# Patient Record
Sex: FEMALE | Race: WHITE | Employment: OTHER | URBAN - METROPOLITAN AREA
[De-identification: names, ages, dates, MRNs, and addresses within clinical notes are randomized per-mention and may not be internally consistent; named-entity substitution may affect disease eponyms.]

---

## 2017-01-20 PROBLEM — R53.82 CHRONIC FATIGUE: Status: ACTIVE | Noted: 2017-01-20

## 2017-06-18 NOTE — H&P
Patient:  Wendy Abraham  YOB: 1945   Date:                    6/19/17      This 81year old  patient was referred by Sarah Jorge MD.    Chief Complaint:  Patient seen at the request of Sarah Jorge MD for evaluation of the following:  diarrhea, Abdominal pain, nausea and vomiting    History of Present Illness:  1.  diarrhea   2. Abdominal pain   3.  nausea and vomiting   Mrs. Spears is a 81year old female who presents for extended follow up regarding diarrhea, abdominal pain, and vomiting. She underwent a colonoscopy on 10/25/16 which showed internal hemorrhoids and diverticulosis, random colon biopsies unremarkable. She has continued to have postprandial nausea, vomiting, and RUQ abdominal pain. She has about two episodes of vomiting per week. Often the food does not make it all the way to her stomach before it is regurgitated. She is status post 2 Nissen fundoplications, performed by Dr. Chris Quintero at [address]. The most recent one was around 2014. For the past year she has had frequent belching which causes social anxiety. Her bowel habits tend towards diarrhea, for which she has been prescribed cholestyramine. She has not taken this regularly because she does not want to \"get used to it\". It is effective when she takes it. Her RUQ pain has been alleviated with ERCP/stenting in 2011. According to her report, she underwent an EUS in [place] around 2014, which showed a non-concerning spot on her pancreas. There are no additional pertinent complaints at this time.         PAST MEDICAL/SURGICAL HISTORY  (Detailed)    Disease/disorder Onset Date Management Date Comments     Nissen Fundoplication [facility] 61/59/9686 -     Cholecystectomy       EGD with foreign body removal 10/2011 pancreatic stent removed     ERCP with sphincterotomy-Pancreatic stent 2011    Esophageal stricture 2011 EGD with dilation 2011    Gastritis  EGD 9/2011 Bile gastritis   Gastroesophageal reflux disease       Hypertension       Sphincter of OD dysfunction 1994 ERCP with sphincterotomy 1994    Stroke 2014   BLR 09/11/2014 - Left sided weakness   Thyroid disease         Additional Medical History:  IBS- diarrhea predominant   Fibrocystic breast disease  Hypothyroid  GERD  PUD- h pylori -neg 2011  HX TIA  5/2014    Additional Surgical History:  Left breast lumpectomy (Benign): 2005  Right needle breast biopsy (benign): 2006  Total abdomen hysterectomy with bilateral salpingo-oophorectomy:1989  Left shoulder surgery:1994    Procedure History:  Test Date Results Interp   COLONOSCOPY 10/25/2016 Diverticulosis large intestine w/o perforation or, Internal hemorrhoids    EGD 08/30/2011 Gastric ulcer, unspec, Esophageal ulcer w/o bleeding, Esophageal stricture    EGD 05/25/2011 Esophageal stricture, Duodenitis        Colonoscopy May 9, 2006 for diarrhea: diverticulosis, internal hemorrhoids. Random biopsies showed focal acute colitis. Colonoscopy surveillance 2013. Colonoscopy 10/13/09 CSY- normal (random bx normal) (surv 2016)  ERCP 1994 SOD dysfunction          PROVIDER INFORMATION AND PATIENT ADMISSION:    CURRENT MEDICATIONS  Medication Name Sig Desc   Zantac Maximum Strength 150 mg tablet take 1 tablet by oral route  every day   [place] Thyroid take 150mg daily   DHEA take 100mg daily   aspirin 325 mg tablet take 1 tablet by oral route  every day   carvedilol 25 mg tablet take 1 tablet by oral route 2 times every day with food   losartan 100 mg-hydrochlorothiazide 25 mg tablet take 1 tablet by oral route  every day   magnesium oxide 400 mg capsule take 1 po BID   Vitamin D3 1,000 unit capsule take 1 po qd   Vitamin B Complex tablet take 1 po qd   Medication Reconciliation  Medications reconciled today. Allergies:  Ingredient Reaction Medication Name Comment   DIPHENHYDRAMINE HCL   BENADRYL. BUTORPHANOL TARTRATE   STADOL. PENTAZOCINE HCL   TALWIN COMPOUND. NALBUPHINE HCL   NUBAIN.    PROCHLORPERAZINE EDISYLATE   COMPAZINE. CODEINE   CODEINE. PERPHENAZINE   TRILAFON. Reviewed, no changes. Family History  (Detailed)  Relationship Family Member Name  Age at Death Condition Onset Age Cause of Death       No family history of Colon polyps  N       No family history of Cancer, colon  N     SOCIAL HISTORY  (Detailed)  Homemaker. Tobacco use reviewed. Preferred language is [place]. MARITAL STATUS/FAMILY/SOCIAL SUPPORT  Currently . Smoking status: Never smoker. SMOKING STATUS  Use Status Type Smoking Status Usage Per Day Years Used Total Pack Years   no/never  Never smoker      no/never  Never smoker          REVIEW OF SYSTEMS  System Neg/Pos Details   ENMT Negative Hearing deficit. Eyes Negative Vision changes. Respiratory Negative Dyspnea. Cardio Negative Chest pain and irregular heartbeat/palpitations. GI Positive Abdominal pain, Diarrhea, Dysphagia, Nausea, Vomiting, Belching.  Negative Dysuria and urinary frequency. Endocrine Negative Cold intolerance and heat intolerance. Neuro Negative Confusion/disorientation, dizziness, headache and seizures. Hema/Lymph Negative Easy bleeding. The remainder of the 11-point review of systems is negative. VITAL SIGNS  BP mm/Hg Pulse/min Resp/min Temp F Height (Total in.) Weight (lbs.) Weight (oz.) BMI   150/88 80 20  62.00 140.00  25.61     PHYSICAL EXAM:  Weight has remained stable. Overweight. Patient is healthy-appearing in no acute  distress. Further exam deferred at this time. Completed Orders (this encounter)  Order Details   Dietary management education, guidance, and counseling    Medications Prescribed (this encounter)  Medication Directions   Questran 4 gram oral powder take 1 scoop by oral route  every day dissolved in 2 to 6 ounces of water or noncarbonated beverage     Assessment/Plan  # Detail Type Description    1. Assessment Diarrhea, unspecified (R19.7).     Plan Orders Today's instructions / counseling include(s) Dietary management education, guidance, and counseling. 2 Assessment Epigastric pain (R10.13). Provider Plan Today the patient's main problem is epigastric abdominal pain which is not new, presenting postprandially, and twice weekly vomiting. She takes Questran for diarrhea. She has a new symptom of belching for the past. Year. She has a history of Nissen fundoplication and redo two years later, most recently in 2014. This was at [address], records of which we do not have. She will bring these records to our office, as she apparently had an EUS at this time. She was lost to follow up after last being seen in 2011, although she was seen in 2014 at which time she was found to have hemoccult positive stool, colonoscopy with no microscopic colitis. She has had a complex case over the past few years. She states that she had some relief with a pancreatic stent. We will proceed with an EGD and dilation because of possible recurrent dysphagia and possible ulcers. Questran once daily at least, with more if needed. Possibly controlling her diarrhea will help with her other symptoms. Future considerations will be had for EUS, pancreatic stenting. I will review my office records as well as her records from [address]. My recollection was that the pancreatic stenting was not helpful, but I will review. Further recommendations after treatment above. Future considerations for treatment with Reglan, although she has shown allergies to similar drugs in the past, which will be taken into account. Addendum-I have reviewed her records and she did have improvement after both of her previous ERCPs or sphincter of oddi dysfunction. She has had a dystonic reaction with phenothiazines and therefore we elected not to use Reglan in the past. Further plans as above            Clinical Guidelines (Detailed)  Guidelines Status Due Action Last Addressed   Influenza vaccine  01/03/2018 Completed on 01/03/2017 01/03/2017   Mammogram Completed 10/19/2017 Performed Elsewhere on 10/19/2016 10/19/2016   Pneumococcal vaccine  01/03/2116 Completed on 01/03/2017 01/03/2017         Counseling / Educational Factors:  Items reviewed / discussed during today's visit: prior testing / procedures were reviewed; testing was discussed in detail; old records were reviewed; indications and risks of the procedure were discussed; prescription medication usage was discussed; symptomatic care was discussed; advised to continue current treatment; patient instructed to call for results of diagnostic testing. Patient expressed understanding of instructions. PT SEEN AND EXAMINED AND PLAN DISCUSSED AND IMPLEMENTED.   Evelyn Rivas MD

## 2017-06-19 ENCOUNTER — APPOINTMENT (OUTPATIENT)
Dept: GENERAL RADIOLOGY | Age: 72
End: 2017-06-19
Attending: INTERNAL MEDICINE
Payer: MEDICARE

## 2017-06-19 ENCOUNTER — HOSPITAL ENCOUNTER (OUTPATIENT)
Age: 72
Setting detail: OUTPATIENT SURGERY
Discharge: HOME | End: 2017-06-19
Attending: INTERNAL MEDICINE | Admitting: INTERNAL MEDICINE
Payer: MEDICARE

## 2017-06-19 ENCOUNTER — ANESTHESIA EVENT (OUTPATIENT)
Dept: ENDOSCOPY | Age: 72
End: 2017-06-19
Payer: MEDICARE

## 2017-06-19 ENCOUNTER — ANESTHESIA (OUTPATIENT)
Dept: ENDOSCOPY | Age: 72
End: 2017-06-19
Payer: MEDICARE

## 2017-06-19 VITALS
OXYGEN SATURATION: 95 % | SYSTOLIC BLOOD PRESSURE: 124 MMHG | HEART RATE: 72 BPM | TEMPERATURE: 98 F | WEIGHT: 136.91 LBS | DIASTOLIC BLOOD PRESSURE: 75 MMHG | BODY MASS INDEX: 24.26 KG/M2 | HEIGHT: 63 IN | RESPIRATION RATE: 16 BRPM

## 2017-06-19 PROCEDURE — 74011250636 HC RX REV CODE- 250/636

## 2017-06-19 PROCEDURE — 77030008477 HC STYL SATN SLP COVD -A: Performed by: ANESTHESIOLOGY

## 2017-06-19 PROCEDURE — 74011000250 HC RX REV CODE- 250

## 2017-06-19 PROCEDURE — 77030007288 HC DEV LOK BILI BSC -A: Performed by: INTERNAL MEDICINE

## 2017-06-19 PROCEDURE — 77030008703 HC TU ET UNCUF COVD -A: Performed by: ANESTHESIOLOGY

## 2017-06-19 PROCEDURE — 77030012595 HC SPHNTOM BILI BSC -D: Performed by: INTERNAL MEDICINE

## 2017-06-19 PROCEDURE — 74011250636 HC RX REV CODE- 250/636: Performed by: INTERNAL MEDICINE

## 2017-06-19 PROCEDURE — 74330 X-RAY BILE/PANC ENDOSCOPY: CPT

## 2017-06-19 PROCEDURE — 76040000025: Performed by: INTERNAL MEDICINE

## 2017-06-19 PROCEDURE — C2617 STENT, NON-COR, TEM W/O DEL: HCPCS | Performed by: INTERNAL MEDICINE

## 2017-06-19 PROCEDURE — 77030032490 HC SLV COMPR SCD KNE COVD -B: Performed by: INTERNAL MEDICINE

## 2017-06-19 PROCEDURE — 74011000250 HC RX REV CODE- 250: Performed by: INTERNAL MEDICINE

## 2017-06-19 PROCEDURE — 74011250636 HC RX REV CODE- 250/636: Performed by: ANESTHESIOLOGY

## 2017-06-19 PROCEDURE — 74011636320 HC RX REV CODE- 636/320: Performed by: INTERNAL MEDICINE

## 2017-06-19 PROCEDURE — 77030009038 HC CATH BILI STN RTVR BSC -C: Performed by: INTERNAL MEDICINE

## 2017-06-19 PROCEDURE — 76060000032 HC ANESTHESIA 0.5 TO 1 HR: Performed by: INTERNAL MEDICINE

## 2017-06-19 DEVICE — ZIMMON PANCREATIC STENT
Type: IMPLANTABLE DEVICE | Site: BILE DUCT | Status: FUNCTIONAL
Brand: ZIMMON

## 2017-06-19 RX ORDER — SODIUM CHLORIDE 0.9 % (FLUSH) 0.9 %
5-10 SYRINGE (ML) INJECTION AS NEEDED
Status: DISCONTINUED | OUTPATIENT
Start: 2017-06-19 | End: 2017-06-19 | Stop reason: HOSPADM

## 2017-06-19 RX ORDER — ROCURONIUM BROMIDE 10 MG/ML
INJECTION, SOLUTION INTRAVENOUS AS NEEDED
Status: DISCONTINUED | OUTPATIENT
Start: 2017-06-19 | End: 2017-06-19 | Stop reason: HOSPADM

## 2017-06-19 RX ORDER — FENTANYL CITRATE 50 UG/ML
INJECTION, SOLUTION INTRAMUSCULAR; INTRAVENOUS AS NEEDED
Status: DISCONTINUED | OUTPATIENT
Start: 2017-06-19 | End: 2017-06-19 | Stop reason: HOSPADM

## 2017-06-19 RX ORDER — PROPOFOL 10 MG/ML
INJECTION, EMULSION INTRAVENOUS AS NEEDED
Status: DISCONTINUED | OUTPATIENT
Start: 2017-06-19 | End: 2017-06-19 | Stop reason: HOSPADM

## 2017-06-19 RX ORDER — LIDOCAINE HYDROCHLORIDE 20 MG/ML
INJECTION, SOLUTION EPIDURAL; INFILTRATION; INTRACAUDAL; PERINEURAL AS NEEDED
Status: DISCONTINUED | OUTPATIENT
Start: 2017-06-19 | End: 2017-06-19 | Stop reason: HOSPADM

## 2017-06-19 RX ORDER — GLYCOPYRROLATE 0.2 MG/ML
INJECTION INTRAMUSCULAR; INTRAVENOUS AS NEEDED
Status: DISCONTINUED | OUTPATIENT
Start: 2017-06-19 | End: 2017-06-19 | Stop reason: HOSPADM

## 2017-06-19 RX ORDER — SUCCINYLCHOLINE CHLORIDE 20 MG/ML
INJECTION INTRAMUSCULAR; INTRAVENOUS AS NEEDED
Status: DISCONTINUED | OUTPATIENT
Start: 2017-06-19 | End: 2017-06-19 | Stop reason: HOSPADM

## 2017-06-19 RX ORDER — ONDANSETRON 2 MG/ML
INJECTION INTRAMUSCULAR; INTRAVENOUS AS NEEDED
Status: DISCONTINUED | OUTPATIENT
Start: 2017-06-19 | End: 2017-06-19 | Stop reason: HOSPADM

## 2017-06-19 RX ORDER — SODIUM CHLORIDE 0.9 % (FLUSH) 0.9 %
5-10 SYRINGE (ML) INJECTION EVERY 8 HOURS
Status: DISCONTINUED | OUTPATIENT
Start: 2017-06-19 | End: 2017-06-19 | Stop reason: HOSPADM

## 2017-06-19 RX ORDER — SODIUM CHLORIDE, SODIUM LACTATE, POTASSIUM CHLORIDE, CALCIUM CHLORIDE 600; 310; 30; 20 MG/100ML; MG/100ML; MG/100ML; MG/100ML
100 INJECTION, SOLUTION INTRAVENOUS CONTINUOUS
Status: DISCONTINUED | OUTPATIENT
Start: 2017-06-19 | End: 2017-06-19 | Stop reason: HOSPADM

## 2017-06-19 RX ORDER — FENTANYL CITRATE 50 UG/ML
50 INJECTION, SOLUTION INTRAMUSCULAR; INTRAVENOUS
Status: DISCONTINUED | OUTPATIENT
Start: 2017-06-19 | End: 2017-06-19 | Stop reason: HOSPADM

## 2017-06-19 RX ORDER — LIDOCAINE HYDROCHLORIDE 10 MG/ML
0.3 INJECTION INFILTRATION; PERINEURAL ONCE
Status: DISCONTINUED | OUTPATIENT
Start: 2017-06-19 | End: 2017-06-19 | Stop reason: HOSPADM

## 2017-06-19 RX ORDER — ONDANSETRON 2 MG/ML
4 INJECTION INTRAMUSCULAR; INTRAVENOUS
Status: DISCONTINUED | OUTPATIENT
Start: 2017-06-19 | End: 2017-06-19 | Stop reason: HOSPADM

## 2017-06-19 RX ADMIN — IOPAMIDOL 26 ML: 755 INJECTION, SOLUTION INTRAVENOUS at 10:15

## 2017-06-19 RX ADMIN — PROPOFOL 140 MG: 10 INJECTION, EMULSION INTRAVENOUS at 09:39

## 2017-06-19 RX ADMIN — SODIUM CHLORIDE, SODIUM LACTATE, POTASSIUM CHLORIDE, AND CALCIUM CHLORIDE 100 ML/HR: 600; 310; 30; 20 INJECTION, SOLUTION INTRAVENOUS at 09:21

## 2017-06-19 RX ADMIN — ROCURONIUM BROMIDE 5 MG: 10 INJECTION, SOLUTION INTRAVENOUS at 09:39

## 2017-06-19 RX ADMIN — PROMETHAZINE HYDROCHLORIDE 6.25 MG: 25 INJECTION INTRAMUSCULAR; INTRAVENOUS at 09:21

## 2017-06-19 RX ADMIN — FENTANYL CITRATE 50 MCG: 50 INJECTION, SOLUTION INTRAMUSCULAR; INTRAVENOUS at 09:39

## 2017-06-19 RX ADMIN — SUCCINYLCHOLINE CHLORIDE 180 MG: 20 INJECTION INTRAMUSCULAR; INTRAVENOUS at 09:39

## 2017-06-19 RX ADMIN — GLYCOPYRROLATE 0.2 MG: 0.2 INJECTION INTRAMUSCULAR; INTRAVENOUS at 09:58

## 2017-06-19 RX ADMIN — ONDANSETRON 4 MG: 2 INJECTION INTRAMUSCULAR; INTRAVENOUS at 10:03

## 2017-06-19 RX ADMIN — LIDOCAINE HYDROCHLORIDE 100 MG: 20 INJECTION, SOLUTION EPIDURAL; INFILTRATION; INTRACAUDAL; PERINEURAL at 09:39

## 2017-06-19 RX ADMIN — FENTANYL CITRATE 50 MCG: 50 INJECTION, SOLUTION INTRAMUSCULAR; INTRAVENOUS at 09:50

## 2017-06-19 NOTE — IP AVS SNAPSHOT
Erinn David 
 
 
 [address] [facility] [address] 
[phone] Patient: Reena Cummins MRN: [id number] :1945 You are allergic to the following Allergen Reactions Benadryl (Diphenhydramine Hcl) Anxiety Codeine Itching Compazine (Prochlorperazine Edisylate) Other (comments) Left face drawing Nubain (Nalbuphine) Anxiety Stadol (Butorphanol Tartrate) Anxiety Talwin (Pentazocine Lactate) Anxiety Trilafon Anxiety Ultram (Tramadol) Unknown (comments) Recent Documentation Height Weight BMI OB Status Smoking Status 1.588 m 62.1 kg 24.64 kg/m2 Hysterectomy Never Smoker Emergency Contacts Name Discharge Info Relation Home Work Mobile Brian Spears  Spouse [3] [phone] About your hospitalization You were admitted on:  2017 You last received care in the:  [facility] PACU You were discharged on:  2017 Unit phone number:  [phone] Why you were hospitalized Your primary diagnosis was:  Not on File Providers Seen During Your Hospitalizations Provider Role Specialty Primary office phone Italia Fall MD Attending Provider Gastroenterology [phone] Your Primary Care Physician (PCP) Primary Care Physician Office Phone Office Fax Kasey [address] [place] [address] [phone] Follow-up Information Follow up With Details Comments Contact Info Italia Fall MD  make a 3 month follow up appointment [address] [place] [address] [facility] [address] [place] [address] 
[phone] Your Appointments 2017  2:30 PM EDT Office Visit with Billy Awad MD  
[facility] Internal Medicine ([address] [place] [address]) [address] [place] [address] [facility] [address] [phone] Current Discharge Medication List  
  
ASK your doctor about these medications Dose & Instructions Dispensing Information Comments Morning Noon Evening Bedtime ARMOUR THYROID PO Your last dose was: Your next dose is:    
   
   
 Dose:  150 mg Take 150 mg by mouth daily. Refills:  0  
     
   
   
   
  
 aspirin 325 mg tablet Commonly known as:  ASPIRIN Your last dose was: Your next dose is:    
   
   
 Dose:  325 mg Take 325 mg by mouth every morning. Stopped 3 weeks ago Refills:  0  
     
   
   
   
  
 bioidentical hormones Your last dose was: Your next dose is: Take  by mouth. Every 6 to 8 months Refills:  0  
     
   
   
   
  
 carvedilol 25 mg tablet Commonly known as:  Esaw Math Your last dose was: Your next dose is:    
   
   
 Dose:  25 mg Take 1 Tab by mouth two (2) times daily (with meals). Quantity:  180 Tab Refills:  3 CHOLESTYRAMINE LIGHT 4 gram powder Generic drug:  Cholestyramine-Aspartame Your last dose was: Your next dose is: MIX 1 SCOOP WITH WATER OR JUICE AND TAKE ORALLY TWICE A DAY FOR CHRONIC DIARRHEA Quantity:  240 g Refills:  5  
     
   
   
   
  
 clobetasol 0.05 % external solution Commonly known as:  Lestine Terranceker Your last dose was: Your next dose is:    
   
   
 APPLY TO AFFECTED AREA AS NEEDED AS DIRECTED Quantity:  50 mL Refills:  4 DHEA PO Your last dose was: Your next dose is:    
   
   
 Dose:  100 mg Take 100 mg by mouth daily. Refills:  0  
     
   
   
   
  
 losartan-hydroCHLOROthiazide 100-25 mg per tablet Commonly known as:  HYZAAR Your last dose was: Your next dose is:    
   
   
 Dose:  1 Tab Take 1 Tab by mouth daily. Indications: Hypertension Quantity:  90 Tab Refills:  3  
     
   
   
   
  
 magnesium 250 mg Tab Your last dose was: Your next dose is:    
   
   
 Dose:  400 mg Take 400 mg by mouth two (2) times a day. Indications: HYPOMAGNESEMIA Refills:  0  
     
   
   
   
  
 meperidine 50 mg tablet Commonly known as:  DEMEROL Your last dose was: Your next dose is: Take  by mouth every six (6) hours as needed for Pain. Refills:  0  
     
   
   
   
  
 ondansetron 4 mg disintegrating tablet Commonly known as:  ZOFRAN ODT Your last dose was: Your next dose is:    
   
   
 Dose:  4 mg Take 1 Tab by mouth every eight (8) hours as needed for Nausea. Quantity:  30 Tab Refills:  5  
     
   
   
   
  
 promethazine 50 mg tablet Commonly known as:  PHENERGAN Your last dose was: Your next dose is:    
   
   
 Dose:  50 mg Take 50 mg by mouth every six (6) hours as needed for Nausea. Refills:  0  
     
   
   
   
  
 VITAMIN B-12 500 mcg tablet Generic drug:  cyanocobalamin Your last dose was: Your next dose is:    
   
   
 Dose:  500 mcg Take 500 mcg by mouth every morning. Refills:  0  
     
   
   
   
  
 VITAMIN D3 2,000 unit Tab Generic drug:  cholecalciferol (vitamin D3) Your last dose was: Your next dose is:    
   
   
 Dose:  1 Tab Take 1 Tab by mouth every morning. Refills:  0  
     
   
   
   
  
 ZANTAC 150 mg tablet Generic drug:  raNITIdine Your last dose was: Your next dose is:    
   
   
 Dose:  150 mg Take 150 mg by mouth two (2) times a day. Refills:  0 Discharge Instructions Endoscopic Retrograde Cholangiopancreatogram (ERCP): What to Expect at [facility] Your Recovery After you have an endoscopic retrograde cholangiopancreatogram (ERCP). You will be able to go home after your doctor or a nurse checks to make sure you are not having any problems.  If you stay in the hospital overnight, you may go home the next day. You may have a sore throat for a day or two after the procedure. This care sheet gives you a general idea about how long it will take for you to recover. But each person recovers at a different pace. Follow the steps below to get better as quickly as possible. How can you care for yourself at home? Activity 1. Rest as much as you need to after you go home. 2. You should be able to go back to your usual activities the day after the procedure. Diet · Follow your doctor's directions for eating after the procedure. · Drink plenty of fluids (unless your doctor tells you not to). Medicines · If you have a sore throat the next day, use an over-the-counter spray to numb your throat. Follow-up care is a key part of your treatment and safety. Be sure to make and go to all appointments, and call your doctor if you are having problems. Its also a good idea to know your test results and keep a list of the medicines you take. When should you call for help? Call 911 anytime you think you may need emergency care. For example, call if: 
· You vomit blood or what looks like coffee grounds. · You pass maroon or bloody stools. Call your doctor now or go to the emergency room if: 
· You have trouble swallowing. · You have belly pain. · Your stools are black and tarlike or have streaks of blood. · You are sick to your stomach and cannot drink fluids. · You have a fever. · You have pain that does not get better after you take your pain medicine. Watch closely for changes in your health, and be sure to contact your doctor if: 
· Your throat still hurts after a day or two. You do not get better as expected. After general anesthesia or intravenous sedation, for 24 hours or while taking prescription Narcotics: · Limit your activities · Do not drive and operate hazardous machinery · Do not make important personal or business decisions · Do  not drink alcoholic beverages · If you have not urinated within 8 hours after discharge, please contact your surgeon on call. *  Please give a list of your current medications to your Primary Care Provider. *  Please update this list whenever your medications are discontinued, doses are 
    changed, or new medications (including over-the-counter products) are added. *  Please carry medication information at all times in case of emergency situations. These are general instructions for a healthy lifestyle: No smoking/ No tobacco products/ Avoid exposure to second hand smoke Surgeon General's Warning:  Quitting smoking now greatly reduces serious risk to your health. Obesity, smoking, and sedentary lifestyle greatly increases your risk for illness A healthy diet, regular physical exercise & weight monitoring are important for maintaining a healthy lifestyle You may be retaining fluid if you have a history of heart failure or if you experience any of the following symptoms:  Weight gain of 3 pounds or more overnight or 5 pounds in a week, increased swelling in our hands or feet or shortness of breath while lying flat in bed. Please call your doctor as soon as you notice any of these symptoms; do not wait until your next office visit. Recognize signs and symptoms of STROKE: 
 
F-face looks uneven A-arms unable to move or move unevenly S-speech slurred or non-existent T-time-call 911 as soon as signs and symptoms begin-DO NOT go Back to bed or wait to see if you get better-TIME IS BRAIN. Discharge Orders None ACO Transitions of Care Introducing [employer] offers a voluntary care coordination program to provide high quality service and care to [facility] fee-for-service beneficiaries. Parisa Ibrahim was designed to help you enhance your health and well-being through the following services: ? Transitions of Care  support for individuals who are transitioning from one care setting to another (example: Hospital to home). ? Chronic and Complex Care Coordination  support for individuals and caregivers of those with serious or chronic illnesses or with more than one chronic (ongoing) condition and those who take a number of different medications. If you meet specific medical criteria, a [facility] may call you directly to coordinate your care with your primary care physician and your other care providers. For questions about the [facility] programs, please, contact your physicians office. For general questions or additional information about Accountable Care Organizations: 
Please visit www.medicare.gov/acos. html or call 1-800-MEDICARE (6-870.160.2245) TTY users should call 3-845.965.3021. Introducing [facility]! [employer] introduces [employer]Line patient portal. Now you can access parts of your medical record, email your doctor's office, and request medication refills online. 1. In your internet browser, go to [email or web address] 2. Click on the First Time User? Click Here link in the Sign In box. You will see the New Member Sign Up page. 3. Enter your TravelLine Access Code exactly as it appears below. You will not need to use this code after youve completed the sign-up process. If you do not sign up before the expiration date, you must request a new code. · TravelLine Access Code: [email or web address] Expires: 9/8/2017  9:04 AM 
 
4. Enter the last four digits of your Social Security Number (xxxx) and Date of Birth (mm/dd/yyyy) as indicated and click Submit. You will be taken to the next sign-up page. 5. Create a TravelLine ID. This will be your TravelLine login ID and cannot be changed, so think of one that is secure and easy to remember. 6. Create a [employer] password. You can change your password at any time. 7. Enter your Password Reset Question and Answer. This can be used at a later time if you forget your password. 8. Enter your e-mail address. You will receive e-mail notification when new information is available in [address] 9. Click Sign Up. You can now view and download portions of your medical record. 10. Click the Download Summary menu link to download a portable copy of your medical information. If you have questions, please visit the Frequently Asked Questions section of the [email or web address] website. Remember, [email or web address] is NOT to be used for urgent needs. For medical emergencies, dial 911. Now available from your iPhone and Android! General Information Please provide this summary of care documentation to your next provider. Patient Signature:  ____________________________________________________________ Date:  ____________________________________________________________  
  
Marielle Bishop Provider Signature:  ____________________________________________________________ Date:  ____________________________________________________________

## 2017-06-19 NOTE — ANESTHESIA PREPROCEDURE EVALUATION
Anesthetic History               Review of Systems / Medical History  Patient summary reviewed and pertinent labs reviewed    Pulmonary  Within defined limits                 Neuro/Psych       CVA (mild left sided weakness)  Psychiatric history     Cardiovascular    Hypertension              Exercise tolerance: >4 METS  Comments: Had stroke and then ECHO with no ASD, normal EF   GI/Hepatic/Renal     GERD    Renal disease: CRI  Hiatal hernia (s/p NISSEN)    Comments: Nausea, vomiting, abdominal pain Endo/Other      Hypothyroidism       Other Findings              Physical Exam    Airway  Mallampati: I  TM Distance: 4 - 6 cm  Neck ROM: normal range of motion   Mouth opening: Normal     Cardiovascular    Rhythm: regular  Rate: normal         Dental    Dentition: Caps/crowns     Pulmonary  Breath sounds clear to auscultation               Abdominal         Other Findings            Anesthetic Plan    ASA: 3  Anesthesia type: general          Induction: RSI  Anesthetic plan and risks discussed with: Patient

## 2017-06-19 NOTE — DISCHARGE INSTRUCTIONS
Endoscopic Retrograde Cholangiopancreatogram (ERCP): What to Expect at [address] [facility]  After you have an endoscopic retrograde cholangiopancreatogram (ERCP). You will be able to go home after your doctor or a nurse checks to make sure you are not having any problems. If you stay in the hospital overnight, you may go home the next day. You may have a sore throat for a day or two after the procedure. This care sheet gives you a general idea about how long it will take for you to recover. But each person recovers at a different pace. Follow the steps below to get better as quickly as possible. How can you care for yourself at home? Activity  1. Rest as much as you need to after you go home. 2. You should be able to go back to your usual activities the day after the procedure. Diet  · Follow your doctor's directions for eating after the procedure. · Drink plenty of fluids (unless your doctor tells you not to). Medicines  · If you have a sore throat the next day, use an over-the-counter spray to numb your throat. Follow-up care is a key part of your treatment and safety. Be sure to make and go to all appointments, and call your doctor if you are having problems. Its also a good idea to know your test results and keep a list of the medicines you take. When should you call for help? Call 911 anytime you think you may need emergency care. For example, call if:  · You vomit blood or what looks like coffee grounds. · You pass maroon or bloody stools. Call your doctor now or go to the emergency room if:  · You have trouble swallowing. · You have belly pain. · Your stools are black and tarlike or have streaks of blood. · You are sick to your stomach and cannot drink fluids. · You have a fever. · You have pain that does not get better after you take your pain medicine. Watch closely for changes in your health, and be sure to contact your doctor if:  · Your throat still hurts after a day or two.   You do not get better as expected. After general anesthesia or intravenous sedation, for 24 hours or while taking prescription Narcotics:  · Limit your activities  · Do not drive and operate hazardous machinery  · Do not make important personal or business decisions  · Do  not drink alcoholic beverages  · If you have not urinated within 8 hours after discharge, please contact your surgeon on call. *  Please give a list of your current medications to your Primary Care Provider. *  Please update this list whenever your medications are discontinued, doses are      changed, or new medications (including over-the-counter products) are added. *  Please carry medication information at all times in case of emergency situations. These are general instructions for a healthy lifestyle:    No smoking/ No tobacco products/ Avoid exposure to second hand smoke    Surgeon General's Warning:  Quitting smoking now greatly reduces serious risk to your health. Obesity, smoking, and sedentary lifestyle greatly increases your risk for illness    A healthy diet, regular physical exercise & weight monitoring are important for maintaining a healthy lifestyle    You may be retaining fluid if you have a history of heart failure or if you experience any of the following symptoms:  Weight gain of 3 pounds or more overnight or 5 pounds in a week, increased swelling in our hands or feet or shortness of breath while lying flat in bed. Please call your doctor as soon as you notice any of these symptoms; do not wait until your next office visit. Recognize signs and symptoms of STROKE:    F-face looks uneven    A-arms unable to move or move unevenly    S-speech slurred or non-existent    T-time-call 911 as soon as signs and symptoms begin-DO NOT go       Back to bed or wait to see if you get better-TIME IS BRAIN.

## 2017-06-19 NOTE — PROCEDURES
ENDOSCOPIC  RETROGRADE CHOLANGIOPANCREATOGRAPHY    DATE of PROCEDURE: 6/19/2017    PT NAME: Tessie Spears     [id number]    MEDICATION: general;   INSTRUMENT:  [id number]    SPECIAL PROCEDURE: balloon sweep of pancreatic, CBD, and cystic duct; 5 fr / 9 cm single pigtail pancreatic stent  BLOOD LOSS- 0 to min. SPEC- no  IMPLANT- none    PROCEDURE: standard procedure w/o complications    ASSESSMENT:  1. CBD and Cystic duct remnant normal  2.  Pancreas normal- stent placed  3.  GB absent    PLAN:  1. F/u in office    Hank Black MD

## 2017-06-19 NOTE — ANESTHESIA POSTPROCEDURE EVALUATION
Post-Anesthesia Evaluation and Assessment    Patient: Isabell Whittington MRN: [id number]  SSN: [id number]    YOB: 1945  Age: 81 y.o. Sex: female       Cardiovascular Function/Vital Signs  Visit Vitals    /75 (BP 1 Location: Right arm, BP Patient Position: At rest)    Pulse 72    Temp 36.7 °C (98 °F)    Resp 16    Ht 5' 2.5\" (1.588 m)    Wt 62.1 kg (136 lb 14.5 oz)    SpO2 95%    BMI 24.64 kg/m2       Patient is status post general anesthesia for Procedure(s):  ENDOSCOPIC RETROGRADE CHOLANGIOPANCREATOGRAPHY/ BMI=26  BILIARY STENT PLACEMENT  ENDOSCOPIC SPHINCTEROTOMY  ENDOSCOPIC STONE EXTRACTION/BALLOON SWEEP. Nausea/Vomiting: Mild    Postoperative hydration reviewed and adequate. Pain:  Pain Scale 1: Numeric (0 - 10) (06/19/17 1055)  Pain Intensity 1: 0 (06/19/17 1055)   Managed    Neurological Status:   Neuro (WDL): Within Defined Limits (06/19/17 1055)  Neuro  LUE Motor Response: Purposeful (06/19/17 1055)  LLE Motor Response: Purposeful (06/19/17 1055)  RUE Motor Response: Purposeful (06/19/17 1055)  RLE Motor Response: Purposeful (06/19/17 1055)   At baseline    Mental Status and Level of Consciousness: Alert and oriented     Pulmonary Status:   O2 Device: Room air (06/19/17 1055)   Adequate oxygenation and airway patent    Complications related to anesthesia: None    Post-anesthesia assessment completed.  No concerns    Signed By: Errol Lee MD     June 19, 2017

## 2018-01-07 NOTE — H&P
>      Patient:  Yara Patrick  YOB: 1945   Date:                       12/28/2017 11:00 AM   Visit Type:                 Office Visit        This 81year old  patient was referred by Dyan Shepard MD.  This 81year old female presents for Nausea & Vomiting, diarrhea and Abdominal pain. History of Present Illness:  1. Nausea & Vomiting   2.  diarrhea   3. Abdominal pain   Mrs. Spears is a 81year old female, who presents for a follow up regarding nausea, vomiting, and abdominal pain. The patient states that she receive no relief from her recent stent placement. She is experiencing an increased amount of pain still. She will also experience nausea and vomiting. She will have an episode of nausea and vomiting every 5 days. She states that the only medication that provides relief is Phenergan. The patient is not currently on pancreatic enzymes at this time. Her dysphagia has been improved, and not causing her any issues at this time. The patient is experiencing diarrhea and is currently taking Questran which provides some relief. She denies trying any OTC diarrhea medications. She confirms that she only other relief she receives for the pain is by drinking wine, which confirms that she is not drinking it excessively. She states that she is still experiencing epigastric pain that radiates to her left and right side. Along with her current symptoms she is also experiencing an increased amount of belching. The patient denies any fever, chills, melena, hematochezia, or any further GI related signs or symptoms.       PAST MEDICAL/SURGICAL HISTORY   (Detailed)    Disease/disorder Onset Date Management Date Comments   SOD 06/19/2017 ERCP- pancreatic stent     Stroke 2014   BLR 09/11/2014 - Left sided weakness   Esophageal stricture 2011 EGD with dilation 2011    Sphincter of OD dysfunction 1994 ERCP with sphincterotomy 1994      Nissen Fundoplication 0272 BLR 60/24/4517 -     EGD with foreign body removal 10/2011 pancreatic stent removed     ERCP with sphincterotomy-Pancreatic stent 2011      Cholecystectomy     Gastritis  EGD 9/2011 Bile gastritis   Gastroesophageal reflux disease       Hypertension       Thyroid disease       Additional Medical History  IBS- diarrhea predominant   Fibrocystic breast disease  Hypothyroid  GERD  PUD- h pylori -neg 2011  HX TIA  5/2014  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  MRI showed mild pancreatic ductal abnormalities with possible chronic pancreatitis in 2015    Additional Surgical History  Left breast lumpectomy (Benign): 2005  Right needle breast biopsy (benign): 2006  Total abdomen hysterectomy with bilateral salpingo-oophorectomy:1989  Left shoulder surgery:1994  [id number] fundoplication in [facility] in 2014  Procedure History  Test Date Results Interp   EGD 05/11/2017 Schatzki's Ring, History of Nissen fundoplication, Gastric erosion determined by endoscopy    COLONOSCOPY 10/25/2016 Diverticulosis large intestine w/o perforation or, Internal hemorrhoids    EGD 08/30/2011 Gastric ulcer, unspec, Esophageal ulcer w/o bleeding, Esophageal stricture    EGD 05/25/2011 Esophageal stricture, Duodenitis        Colonoscopy May 9, 2006 for diarrhea: diverticulosis, internal hemorrhoids. Random biopsies showed focal acute colitis. Colonoscopy surveillance 2013.   Colonoscopy 10/13/09 CSY- normal (random bx normal) (surv 2016)  ERCP 1994 SOD dysfunction      CURRENT MEDICATIONS  Medication Name Sig Desc   meperidine 50 mg tablet take 1 tablet by oral route  every 4 hours as needed as needed   Carafate 1 gram tablet take 1 tablet by oral route 4 times every day on an empty stomach, dissovle in 1 oz water   Zantac Maximum Strength 150 mg tablet take 1 tablet by oral route  every day   [place] Thyroid take 150mg daily   DHEA take 100mg daily   aspirin 325 mg tablet take 1 tablet by oral route  every day   carvedilol 25 mg tablet take 1 tablet by oral route 2 times every day with food   losartan 100 mg-hydrochlorothiazide 25 mg tablet take 1 tablet by oral route  every day   magnesium oxide 400 mg capsule take 1 po BID   Vitamin D3 1,000 unit capsule take 1 po qd   Vitamin B Complex tablet take 1 po qd     Medication Reconciliation  Medications reconciled today. ALLERGIES  Ingredient Reaction Medication Name Comment   DIPHENHYDRAMINE HCL jittery  BENADRYL. BUTORPHANOL TARTRATE jittery  STADOL. PENTAZOCINE HCL jittery  TALWIN COMPOUND. NALBUPHINE HCL jittery  NUBAIN. PROCHLORPERAZINE EDISYLATE jittery  COMPAZINE. CODEINE jittery  CODEINE. PERPHENAZINE jittery  TRILAFON. Reviewed, no changes. Family History  (Detailed)  Relationship  Age at Death Condition Onset Age Cause of Death      No family history of Colon polyps  N      No family history of Cancer, colon  N         Social History:  (Detailed)  Homemaker. Preferred language is [place]. MARITAL STATUS/FAMILY/SOCIAL SUPPORT  Currently . SMOKING STATUS  Use Status Type Smoking Status Usage Per Day Years Used Total Pack Years   no/never  Never smoker      no/never  Never smoker          HOME ENVIRONMENT/SAFETY  The patient has not fallen in the last year. Review of Systems  System Neg/Pos Details   ENMT Negative Hearing deficit. Eyes Negative Vision changes. Respiratory Negative Dyspnea. Cardio Negative Chest pain and Irregular heartbeat/palpitations. GI Positive Abdominal pain, Diarrhea, Nausea, Vomiting.  Negative Dysuria and Urinary frequency. Endocrine Negative Cold intolerance and Heat intolerance. Neuro Negative Confusion/disorientation, Dizziness, Headache and Seizures. Hema/Lymph Negative Easy bleeding. VITAL SIGNS  BP mm/Hg Pulse/min Resp/min Temp F Height (Total in.) Weight (lbs.) BMI   130/78 80 14  62.00 138.20 25.28     PHYSICAL EXAM  Weight has remained stable. Patient is healthy-appearing in no acute  distress.   Further exam deferred at this time. Medications Prescribed/Renewed (this encounter)  Medication Directions   DOMPERIDONE 10mg 10 mg ORAL Take 2 po QID   promethazine 25 mg tablet take 1 tablet by oral route  every 4 - 6 hours as needed       Assessment/Plan  # Detail Type Description    1. 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 Plan Orders She is to schedule a follow-up visit with Hali Tse MD 9:30am GVO on 03/23/2018.         2. Assessment Schatzki's ring (K22.2). 3. Assessment Colon cancer screening (Z12.11). Provider Plan She remains to be a very complex patient. We have tried a pancreatic stent which didn't improve her symptoms. She drinks a bottle of wine over the course of a month to improve the pain. She has tried Phenergan, which helps. She is currently out Phenergan which is when she will experience vomiting and nausea. Her dysphagia is under control, and her diarrhea is under control with Questran. Her biggest problem is managing her nausea, vomiting, and pain. We will order a KUB to see if the stent is still place, if it is not we will schedule for it to be removed. I will not plan on a MRCP at that time. Overall plan is for her to use Phenergan liberally. It is okay for her to drink wine when the pain is at its worse. We will also start her on a trial of Domperidone. We will evaluate the stent. She is up to date on her colon cancer screening. The patient is to follow up in 3 months time. Plan Orders Further diagnostic evaluations ordered today include(s) KUB to be performed. COUNSELING / EDUCATIONAL FACTORS:  Items reviewed / discussed during today's visit: prior testing / procedures were reviewed; old records were reviewed; prescription medication usage was discussed; symptomatic care was discussed; advised to continue current treatment. Patient expressed understanding of instructions. The patient was checked out at 11:18 AM by Freddy Poole.         I personally performed the services described in this documentation. Garry Kee (Scribe) assisted in my presence with documentation, which I have reviewed and validated. Provider:       Hanna Galvan  12/29/2017 8:38 AM   Document generated by: Kay Faulkner MD 12/29/2017    CC Providers:  Barrie Monroy MD   [facility]  [facility]-        Electronically signed by Kay Faulkner MD on 12/29/2017 08:38 AM

## 2018-01-11 ENCOUNTER — ANESTHESIA EVENT (OUTPATIENT)
Dept: ENDOSCOPY | Age: 73
End: 2018-01-11
Payer: MEDICARE

## 2018-01-12 ENCOUNTER — HOSPITAL ENCOUNTER (OUTPATIENT)
Age: 73
Setting detail: OUTPATIENT SURGERY
Discharge: HOME | End: 2018-01-12
Attending: INTERNAL MEDICINE | Admitting: INTERNAL MEDICINE
Payer: MEDICARE

## 2018-01-12 ENCOUNTER — ANESTHESIA (OUTPATIENT)
Dept: ENDOSCOPY | Age: 73
End: 2018-01-12
Payer: MEDICARE

## 2018-01-12 VITALS
DIASTOLIC BLOOD PRESSURE: 68 MMHG | TEMPERATURE: 98.5 F | RESPIRATION RATE: 10 BRPM | HEIGHT: 63 IN | SYSTOLIC BLOOD PRESSURE: 117 MMHG | OXYGEN SATURATION: 95 % | WEIGHT: 138 LBS | BODY MASS INDEX: 24.45 KG/M2 | HEART RATE: 65 BPM

## 2018-01-12 PROCEDURE — 74011250636 HC RX REV CODE- 250/636

## 2018-01-12 PROCEDURE — 76040000025: Performed by: INTERNAL MEDICINE

## 2018-01-12 PROCEDURE — 74011250636 HC RX REV CODE- 250/636: Performed by: ANESTHESIOLOGY

## 2018-01-12 PROCEDURE — 77030013991 HC SNR POLYP ENDOSC BSC -A: Performed by: INTERNAL MEDICINE

## 2018-01-12 PROCEDURE — 76060000031 HC ANESTHESIA FIRST 0.5 HR: Performed by: INTERNAL MEDICINE

## 2018-01-12 PROCEDURE — 74011000250 HC RX REV CODE- 250: Performed by: ANESTHESIOLOGY

## 2018-01-12 RX ORDER — SODIUM CHLORIDE, SODIUM LACTATE, POTASSIUM CHLORIDE, CALCIUM CHLORIDE 600; 310; 30; 20 MG/100ML; MG/100ML; MG/100ML; MG/100ML
100 INJECTION, SOLUTION INTRAVENOUS CONTINUOUS
Status: DISCONTINUED | OUTPATIENT
Start: 2018-01-12 | End: 2018-01-12 | Stop reason: HOSPADM

## 2018-01-12 RX ORDER — SODIUM CHLORIDE 0.9 % (FLUSH) 0.9 %
5-10 SYRINGE (ML) INJECTION AS NEEDED
Status: DISCONTINUED | OUTPATIENT
Start: 2018-01-12 | End: 2018-01-12 | Stop reason: HOSPADM

## 2018-01-12 RX ORDER — PROPOFOL 10 MG/ML
INJECTION, EMULSION INTRAVENOUS AS NEEDED
Status: DISCONTINUED | OUTPATIENT
Start: 2018-01-12 | End: 2018-01-12 | Stop reason: HOSPADM

## 2018-01-12 RX ORDER — PROPOFOL 10 MG/ML
INJECTION, EMULSION INTRAVENOUS
Status: DISCONTINUED | OUTPATIENT
Start: 2018-01-12 | End: 2018-01-12 | Stop reason: HOSPADM

## 2018-01-12 RX ADMIN — PROPOFOL 150 MG: 10 INJECTION, EMULSION INTRAVENOUS at 13:25

## 2018-01-12 RX ADMIN — SODIUM CHLORIDE, SODIUM LACTATE, POTASSIUM CHLORIDE, AND CALCIUM CHLORIDE 100 ML/HR: 600; 310; 30; 20 INJECTION, SOLUTION INTRAVENOUS at 13:00

## 2018-01-12 RX ADMIN — PROPOFOL 180 MCG/KG/MIN: 10 INJECTION, EMULSION INTRAVENOUS at 13:25

## 2018-01-12 RX ADMIN — FAMOTIDINE 20 MG: 10 INJECTION, SOLUTION INTRAVENOUS at 13:20

## 2018-01-12 RX ADMIN — SODIUM CHLORIDE, SODIUM LACTATE, POTASSIUM CHLORIDE, AND CALCIUM CHLORIDE: 600; 310; 30; 20 INJECTION, SOLUTION INTRAVENOUS at 13:20

## 2018-01-12 NOTE — ANESTHESIA POSTPROCEDURE EVALUATION
Post-Anesthesia Evaluation and Assessment    Patient: Reginald Dennis MRN: [id number]  SSN: [id number]    YOB: 1945  Age: 81 y.o. Sex: female       Cardiovascular Function/Vital Signs  Visit Vitals    /68    Pulse 65    Temp 36.9 °C (98.5 °F)    Resp 10    Ht 5' 2.5\" (1.588 m)    Wt 62.6 kg (138 lb)    SpO2 95%    BMI 24.84 kg/m2       Patient is status post total IV anesthesia anesthesia for Procedure(s):  ESOPHAGOGASTRODUODENOSCOPY (EGD) / BMI=25  ENDOSCOPY WITH PROSTHESIS OR STENT REMOVAL. Nausea/Vomiting: None    Postoperative hydration reviewed and adequate. Pain:  Pain Scale 1: Numeric (0 - 10) (01/12/18 1347)  Pain Intensity 1: 0 (01/12/18 1347)   Managed    Neurological Status: At baseline    Mental Status and Level of Consciousness: Arousable    Pulmonary Status:   O2 Device: Room air (01/12/18 1356)   Adequate oxygenation and airway patent    Complications related to anesthesia: None    Post-anesthesia assessment completed.  No concerns    Signed By: Raul Gan MD     January 12, 2018

## 2018-01-12 NOTE — INTERVAL H&P NOTE
H&P Update:  Wallace Vidal was seen and examined. History and physical has been reviewed. The patient has been examined.  There have been no significant clinical changes since the completion of the originally dated History and Physical.    Signed By: Singh Aguilera MD     January 12, 2018 12:55 PM

## 2018-01-12 NOTE — ANESTHESIA PREPROCEDURE EVALUATION
Anesthetic History     PONV          Review of Systems / Medical History  Patient summary reviewed and pertinent labs reviewed    Pulmonary  Within defined limits                 Neuro/Psych     seizures (after surgery x 1 1995)    TIA     Cardiovascular    Hypertension: well controlled              Exercise tolerance: >4 METS  Comments: Syncope--remote past  EF normal   GI/Hepatic/Renal     GERD: well controlled    Renal disease: stones  Hiatal hernia and PUD     Endo/Other      Hypothyroidism: well controlled  Arthritis     Other Findings   Comments: Esophageal stricture  Esophageal ulcer  IBS  DVT  migraines         Physical Exam    Airway  Mallampati: II  TM Distance: 4 - 6 cm  Neck ROM: normal range of motion   Mouth opening: Normal     Cardiovascular  Regular rate and rhythm,  S1 and S2 normal,  no murmur, click, rub, or gallop  Rhythm: regular  Rate: normal         Dental    Dentition: Implants     Pulmonary  Breath sounds clear to auscultation               Abdominal  GI exam deferred       Other Findings            Anesthetic Plan    ASA: 3  Anesthesia type: total IV anesthesia          Induction: Intravenous  Anesthetic plan and risks discussed with: Patient

## 2018-01-12 NOTE — DISCHARGE INSTRUCTIONS
Gastrointestinal Esophagogastroduodenoscopy (EGD) - Upper Exam Discharge Instructions    1. Call Dr. Orrie Scheuermann for any problems or questions. 2. Contact the doctor's office for follow up appointment as directed. 3. Medication may cause drowsiness for several hours, therefore, do not drive or operate machinery for remainder of the day. 4. No alcohol today. 5. Ordinarily, you may resume regular diet and activity after exam unless otherwise specified by your physician. 6. For mild soreness in your throat you may use Cepacol throat lozenges or warm salt-water gargles as needed. Any additional instructions: follow up as needed      Instructions given to PAYMILL and other family members.

## 2018-01-12 NOTE — PROCEDURES
ESOPHAGOGASTRODUODENOSCOPY    DATE of PROCEDURE: 1/12/2018    PT NAME: Mayela Spears     [id number]    MEDICATION:   MAC        INSTRUMENT: [id number]    SPECIAL PROCEDURE: stent removal  BLOOD LOSS- 0 or min. SPEC- no  IMPLANT- none    PROCEDURE:  Standard EGD      ASSESSMENT:  1. Pancreatic stent removed  2.  Normal mucosa    PLAN:   1. F/U prn    C Elsa Amaro MD

## 2018-01-12 NOTE — IP AVS SNAPSHOT
[facility] 
 
 
 [address] 
[phone] Patient: Tj Martinez MRN: [id number] :1945 About your hospitalization You were admitted on:  2018 You last received care in the:  [facility] You were discharged on:  2018 Why you were hospitalized Your primary diagnosis was:  Not on File Follow-up Information None Your Scheduled Appointments Monday January 15, 2018  3:00 PM EST  
LAB with FIM LAB [facility] ([address] [place] [address]) [phone] [facility] Jabari Case [id number]  
[phone] 2018  4:15 PM EST Office Visit with Zev Storey MD  
[facility] ([address] [place] [address]) [address] [place] [address] [address] 81  
[phone] Discharge Orders None A check oralia indicates which time of day the medication should be taken. My Medications ASK your doctor about these medications Instructions Each Dose to Equal  
 Morning Noon Evening Bedtime ARMOUR THYROID PO Your last dose was: Your next dose is: Take 150 mg by mouth daily. 150 mg  
    
   
   
   
  
 aspirin 325 mg tablet Commonly known as:  ASPIRIN Your last dose was: Your next dose is: Take 325 mg by mouth every morning. Stopped 3 weeks ago 325 mg  
    
   
   
   
  
 bioidentical hormones Your last dose was: Your next dose is: Take  by mouth. Every 6 to 8 months  
     
   
   
   
  
 carvedilol 25 mg tablet Commonly known as:  Midge Chihuahua Your last dose was: Your next dose is: Take 1 Tab by mouth two (2) times daily (with meals). 25 mg  
    
   
   
   
  
 CHOLESTYRAMINE LIGHT 4 gram powder Generic drug:  Cholestyramine-Aspartame Your last dose was: Your next dose is: MIX 1 SCOOP WITH WATER OR JUICE AND TAKE ORALLY TWICE A DAY FOR CHRONIC DIARRHEA  
     
   
   
   
  
 clobetasol 0.05 % external solution Commonly known as:  Primitivo Posadas Your last dose was: Your next dose is:    
   
   
 APPLY TO AFFECTED AREA AS NEEDED AS DIRECTED  
     
   
   
   
  
 DHEA PO Your last dose was: Your next dose is: Take 100 mg by mouth daily. 100 mg  
    
   
   
   
  
 losartan-hydroCHLOROthiazide 100-25 mg per tablet Commonly known as:  HYZAAR Your last dose was: Your next dose is: Take 1 Tab by mouth daily. Indications: Hypertension 1 Tab  
    
   
   
   
  
 magnesium 250 mg Tab Your last dose was: Your next dose is: Take 400 mg by mouth two (2) times a day. Indications: HYPOMAGNESEMIA  
 400 mg  
    
   
   
   
  
 meperidine 50 mg tablet Commonly known as:  DEMEROL Your last dose was: Your next dose is: Take  by mouth every six (6) hours as needed for Pain. ondansetron 4 mg disintegrating tablet Commonly known as:  ZOFRAN ODT Your last dose was: Your next dose is: Take 1 Tab by mouth every eight (8) hours as needed for Nausea. 4 mg  
    
   
   
   
  
 promethazine 50 mg tablet Commonly known as:  PHENERGAN Your last dose was: Your next dose is: Take 50 mg by mouth every six (6) hours as needed for Nausea. 50 mg  
    
   
   
   
  
 VITAMIN B-12 500 mcg tablet Generic drug:  cyanocobalamin Your last dose was: Your next dose is: Take 500 mcg by mouth every morning. 500 mcg VITAMIN D3 2,000 unit Tab Generic drug:  cholecalciferol (vitamin D3) Your last dose was: Your next dose is: Take 1 Tab by mouth every morning. 1 Tab ZANTAC 150 mg tablet Generic drug:  raNITIdine Your last dose was: Your next dose is: Take 150 mg by mouth two (2) times a day. 150 mg Discharge Instructions Gastrointestinal Esophagogastroduodenoscopy (EGD) - Upper Exam Discharge Instructions 1. Call Dr. Stanislav Darnell for any problems or questions. 2. Contact the doctor's office for follow up appointment as directed. 3. Medication may cause drowsiness for several hours, therefore, do not drive or operate machinery for remainder of the day. 4. No alcohol today. 5. Ordinarily, you may resume regular diet and activity after exam unless otherwise specified by your physician. 6. For mild soreness in your throat you may use Cepacol throat lozenges or warm salt-water gargles as needed. Any additional instructions: follow up as needed Instructions given to [facility] and other family members. ACO Transitions of Care [facility] [address] Irby offers a voluntary care coordination program to provide high quality service and care to [facility] fee-for-service beneficiaries. Hal Omalley was designed to help you enhance your health and well-being through the following services: ? Transitions of Care  support for individuals who are transitioning from one care setting to another (example: Hospital to home). ? Chronic and Complex Care Coordination  support for individuals and caregivers of those with serious or chronic illnesses or with more than one chronic (ongoing) condition and those who take a number of different medications. If you meet specific medical criteria, a [address] [place] [address] Rd may call you directly to coordinate your care with your primary care physician and your other care providers. For questions about the [facility] programs, please, contact your physicians office. For general questions or additional information about Accountable Care Organizations: 
Please visit www.medicare.gov/acos. html or call 1-800-MEDICARE (8-652.906.5204) TTY users should call 1-746.905.8268. Introducing hospitals & HEALTH SERVICES! [facility] introduces Flexiant patient portal. Now you can access parts of your medical record, email your doctor's office, and request medication refills online. 1. In your internet browser, go to [email or web address] 2. Click on the First Time User? Click Here link in the Sign In box. You will see the New Member Sign Up page. 3. Enter your Flexiant Access Code exactly as it appears below. You will not need to use this code after youve completed the sign-up process. If you do not sign up before the expiration date, you must request a new code. · Flexiant Access Code: [email or web address] Expires: 4/12/2018 10:46 AM 
 
4. Enter the last four digits of your Social Security Number (xxxx) and Date of Birth (mm/dd/yyyy) as indicated and click Submit. You will be taken to the next sign-up page. 5. Create a Flexiant ID. This will be your Flexiant login ID and cannot be changed, so think of one that is secure and easy to remember. 6. Create a Flexiant password. You can change your password at any time. 7. Enter your Password Reset Question and Answer. This can be used at a later time if you forget your password. 8. Enter your e-mail address. You will receive e-mail notification when new information is available in [address] 9. Click Sign Up. You can now view and download portions of your medical record. 10. Click the Download Summary menu link to download a portable copy of your medical information. If you have questions, please visit the Frequently Asked Questions section of the Flexiant website. Remember, Flexiant is NOT to be used for urgent needs. For medical emergencies, dial 911. Now available from your iPhone and Android! Providers Seen During Your Hospitalization Provider Specialty Primary office phone Rodolfo Felton MD Gastroenterology [phone] Your Primary Care Physician (PCP) Primary Care Physician Office Phone Office Fax [facility] Shepard [facility] [phone] You are allergic to the following Allergen Reactions Benadryl (Diphenhydramine Hcl) Anxiety Codeine Itching Compazine (Prochlorperazine Edisylate) Other (comments) Left face drawing Nubain (Nalbuphine) Anxiety Stadol (Butorphanol Tartrate) Anxiety Talwin (Pentazocine Lactate) Anxiety Trilafon Anxiety Ultram (Tramadol) Unknown (comments) Recent Documentation Height Weight BMI OB Status Smoking Status 1.588 m 62.6 kg 24.84 kg/m2 Hysterectomy Never Smoker Emergency Contacts Name Discharge Info Relation Home Work Mobile Brian Spears DISCHARGE CAREGIVER [3] Spouse [3] [phone] Patient Belongings The following personal items are in your possession at time of discharge: 
  Dental Appliances: None  Visual Aid: Contacts, At home Please provide this summary of care documentation to your next provider. Signatures-by signing, you are acknowledging that this After Visit Summary has been reviewed with you and you have received a copy. Patient Signature:  ____________________________________________________________ Date:  ____________________________________________________________  
  
Shea Moritz Provider Signature:  ____________________________________________________________ Date:  ____________________________________________________________

## 2018-01-12 NOTE — ROUTINE PROCESS
VSS. No complaints noted. Education given and reviewed with  who voiced understanding. Pt wheeled out via wheelchair by Juan José Nichols, [address].

## 2018-04-12 PROBLEM — G43.119 INTRACTABLE MIGRAINE WITH AURA WITHOUT STATUS MIGRAINOSUS: Status: ACTIVE | Noted: 2018-04-12

## 2018-05-17 ENCOUNTER — APPOINTMENT (RX ONLY)
Dept: URBAN - METROPOLITAN AREA CLINIC 23 | Facility: CLINIC | Age: 73
Setting detail: DERMATOLOGY
End: 2018-05-17

## 2018-05-17 DIAGNOSIS — D22 MELANOCYTIC NEVI: ICD-10-CM

## 2018-05-17 DIAGNOSIS — L82.1 OTHER SEBORRHEIC KERATOSIS: ICD-10-CM

## 2018-05-17 DIAGNOSIS — D485 NEOPLASM OF UNCERTAIN BEHAVIOR OF SKIN: ICD-10-CM

## 2018-05-17 DIAGNOSIS — L82.0 INFLAMED SEBORRHEIC KERATOSIS: ICD-10-CM

## 2018-05-17 PROBLEM — D22.5 MELANOCYTIC NEVI OF TRUNK: Status: ACTIVE | Noted: 2018-05-17

## 2018-05-17 PROBLEM — D48.5 NEOPLASM OF UNCERTAIN BEHAVIOR OF SKIN: Status: ACTIVE | Noted: 2018-05-17

## 2018-05-17 PROCEDURE — ? SHAVE REMOVAL

## 2018-05-17 PROCEDURE — 17110 DESTRUCTION B9 LES UP TO 14: CPT

## 2018-05-17 PROCEDURE — 99203 OFFICE O/P NEW LOW 30 MIN: CPT | Mod: 25

## 2018-05-17 PROCEDURE — 11300 SHAVE SKIN LESION 0.5 CM/<: CPT

## 2018-05-17 PROCEDURE — ? DEFER

## 2018-05-17 PROCEDURE — ? LIQUID NITROGEN

## 2018-05-17 ASSESSMENT — LOCATION DETAILED DESCRIPTION DERM
LOCATION DETAILED: LEFT PROXIMAL PRETIBIAL REGION
LOCATION DETAILED: EPIGASTRIC SKIN
LOCATION DETAILED: RIGHT SUPERIOR POSTERIOR PARIETAL SCALP
LOCATION DETAILED: MIDDLE STERNUM
LOCATION DETAILED: LEFT MEDIAL BREAST 11-12:00 REGION
LOCATION DETAILED: RIGHT SUPERIOR MEDIAL MIDBACK
LOCATION DETAILED: INFERIOR THORACIC SPINE

## 2018-05-17 ASSESSMENT — LOCATION SIMPLE DESCRIPTION DERM
LOCATION SIMPLE: LEFT BREAST
LOCATION SIMPLE: CHEST
LOCATION SIMPLE: UPPER BACK
LOCATION SIMPLE: POSTERIOR SCALP
LOCATION SIMPLE: ABDOMEN
LOCATION SIMPLE: LEFT PRETIBIAL REGION
LOCATION SIMPLE: RIGHT LOWER BACK

## 2018-05-17 ASSESSMENT — LOCATION ZONE DERM
LOCATION ZONE: TRUNK
LOCATION ZONE: SCALP
LOCATION ZONE: LEG

## 2018-06-19 ENCOUNTER — APPOINTMENT (RX ONLY)
Dept: URBAN - METROPOLITAN AREA CLINIC 23 | Facility: CLINIC | Age: 73
Setting detail: DERMATOLOGY
End: 2018-06-19

## 2018-06-19 DIAGNOSIS — D485 NEOPLASM OF UNCERTAIN BEHAVIOR OF SKIN: ICD-10-CM

## 2018-06-19 PROBLEM — I10 ESSENTIAL (PRIMARY) HYPERTENSION: Status: ACTIVE | Noted: 2018-06-19

## 2018-06-19 PROBLEM — D48.5 NEOPLASM OF UNCERTAIN BEHAVIOR OF SKIN: Status: ACTIVE | Noted: 2018-06-19

## 2018-06-19 PROCEDURE — 11100: CPT

## 2018-06-19 PROCEDURE — ? BIOPSY BY PUNCH METHOD

## 2018-06-19 ASSESSMENT — LOCATION DETAILED DESCRIPTION DERM: LOCATION DETAILED: RIGHT SUPERIOR POSTERIOR PARIETAL SCALP

## 2018-06-19 ASSESSMENT — LOCATION SIMPLE DESCRIPTION DERM: LOCATION SIMPLE: POSTERIOR SCALP

## 2018-06-19 ASSESSMENT — LOCATION ZONE DERM: LOCATION ZONE: SCALP

## 2019-09-19 ENCOUNTER — APPOINTMENT (RX ONLY)
Dept: URBAN - METROPOLITAN AREA CLINIC 23 | Facility: CLINIC | Age: 74
Setting detail: DERMATOLOGY
End: 2019-09-19

## 2019-09-19 DIAGNOSIS — Z87.2 PERSONAL HISTORY OF DISEASES OF THE SKIN AND SUBCUTANEOUS TISSUE: ICD-10-CM

## 2019-09-19 DIAGNOSIS — L663 OTHER SPECIFIED DISEASES OF HAIR AND HAIR FOLLICLES: ICD-10-CM

## 2019-09-19 DIAGNOSIS — L73.9 FOLLICULAR DISORDER, UNSPECIFIED: ICD-10-CM

## 2019-09-19 DIAGNOSIS — L738 OTHER SPECIFIED DISEASES OF HAIR AND HAIR FOLLICLES: ICD-10-CM

## 2019-09-19 DIAGNOSIS — L82.1 OTHER SEBORRHEIC KERATOSIS: ICD-10-CM

## 2019-09-19 DIAGNOSIS — L82.0 INFLAMED SEBORRHEIC KERATOSIS: ICD-10-CM

## 2019-09-19 DIAGNOSIS — D18.0 HEMANGIOMA: ICD-10-CM

## 2019-09-19 PROBLEM — L02.426 FURUNCLE OF LEFT LOWER LIMB: Status: ACTIVE | Noted: 2019-09-19

## 2019-09-19 PROBLEM — D18.01 HEMANGIOMA OF SKIN AND SUBCUTANEOUS TISSUE: Status: ACTIVE | Noted: 2019-09-19

## 2019-09-19 PROBLEM — E03.9 HYPOTHYROIDISM, UNSPECIFIED: Status: ACTIVE | Noted: 2019-09-19

## 2019-09-19 PROCEDURE — 17110 DESTRUCTION B9 LES UP TO 14: CPT

## 2019-09-19 PROCEDURE — ? LIQUID NITROGEN

## 2019-09-19 PROCEDURE — ? COUNSELING

## 2019-09-19 PROCEDURE — 99214 OFFICE O/P EST MOD 30 MIN: CPT | Mod: 25

## 2019-09-19 ASSESSMENT — LOCATION SIMPLE DESCRIPTION DERM
LOCATION SIMPLE: LEFT ANTERIOR NECK
LOCATION SIMPLE: ABDOMEN
LOCATION SIMPLE: RIGHT LOWER BACK
LOCATION SIMPLE: LEFT THIGH
LOCATION SIMPLE: LEFT BREAST

## 2019-09-19 ASSESSMENT — LOCATION ZONE DERM
LOCATION ZONE: NECK
LOCATION ZONE: TRUNK
LOCATION ZONE: LEG

## 2019-09-19 ASSESSMENT — LOCATION DETAILED DESCRIPTION DERM
LOCATION DETAILED: LEFT MEDIAL BREAST 10-11:00 REGION
LOCATION DETAILED: LEFT INFERIOR ANTERIOR NECK
LOCATION DETAILED: LEFT RIB CAGE
LOCATION DETAILED: RIGHT RIB CAGE
LOCATION DETAILED: RIGHT SUPERIOR MEDIAL MIDBACK
LOCATION DETAILED: LEFT ANTERIOR PROXIMAL THIGH

## 2020-12-07 NOTE — H&P
Patient:   Ted Noguera  YOB: 1945   Date:                        12/04/2020 10:45 AM   Visit Type:                  Office Visit  Provider:   Pardeep Jerome MD  Referring Provider:  Viky Stevens MD [facility]  Primary Care Provider: Viky Stevens MD [facility]    This 70year old  patient was referred by Viky Stevens MD.  This 70year old female presents for abdominal pain and nausea. History of Present Illness:  1.  abdominal pain   2.  nausea   Mrs. Spears is a 70year old female with history of SOD who presents to our [facility] for evaluation of abdominal pain and nausea. Labs 10/8/20: CBC and CMP normal.    Patient is accompanied by her  today. She complains of insomnia and worsening LLQ pain over the past weeks. The pain worsens postprandially. No improvement with Elavil. Appetite is decreased secondary to both pain and nausea. Nausea is moderately better with Phenergan.     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 ERCP- pancreatic stent     Stroke 2014   BLR 09/11/2014 - Left sided weakness   Esophageal stricture 2011 EGD with dilation 2011    Sphincter of OD dysfunction 1994 ERCP with sphincterotomy 1994    stent removal  EGD 01/12/2018      Nissen Fundoplication 9039 BLR 19/97/2184 -     EGD with foreign body removal 10/2011 pancreatic stent removed     ERCP with sphincterotomy-Pancreatic stent 2011      Cholecystectomy     Gastritis  EGD 9/2011 Bile gastritis   Gastroesophageal reflux disease       Hypertension       Thyroid disease         Additional Medical History  IBS- diarrhea predominant   Fibrocystic breast disease  Hypothyroid  GERD  PUD- h pylori -neg 2011  HX TIA  5/2014  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  MRI showed mild pancreatic ductal abnormalities with possible chronic pancreatitis in 2015    Additional Surgical History  Left breast lumpectomy (Benign):   Right needle breast biopsy (benign):   Total abdomen hysterectomy with bilateral salpingo-oophorectomy:  Left shoulder surgery:  [id number] fundoplication in [facility] in     Procedure History  Test Date Results Interp   EGD 2017 Schatzki's Ring, History of Nissen fundoplication, Gastric erosion determined by endoscopy    COLONOSCOPY 10/25/2016 Diverticulosis large intestine w/o perforation or, Internal hemorrhoids    EGD 2011 Gastric ulcer, unspec, Esophageal ulcer w/o bleeding, Esophageal stricture    EGD 2011 Esophageal stricture, Duodenitis      Colonoscopy May 9, 2006 for diarrhea: diverticulosis, internal hemorrhoids. Random biopsies showed focal acute colitis. Colonoscopy surveillance . Colonoscopy 10/13/09 CSY- normal (random bx normal) (surv 2016)  ERCP  SOD dysfunction    Family History:  (Detailed)  Relationship Family Member Name  Age at Death Condition Onset Age Cause of Death       No family history of Colon polyps  N       No family history of Cancer, colon  N       Social History:  (Detailed)  Homemaker. Preferred language is Protonet. MARITAL STATUS/FAMILY/SOCIAL SUPPORT  Currently . CHILDREN  Has children:  1 son(s). 1 daughter(s). Tobacco use status: Never smoked tobacco.  Smoking status: Never smoker. ALCOHOL  There is no history of alcohol use. HOME ENVIRONMENT/SAFETY  The patient has fallen 1 times in the last year. The fall(s) resulted in injury.      Current Medications:  Medication Directions   [place] Thyroid take 150mg daily   Aspir-81 mg tablet,delayed release take 1 tablet by oral route  every day   carvedilol 25 mg tablet take 1 tablet by oral route 2 times every day with food   Cholestyramine Light 4 gram powder for susp in a packet take 1 packet by oral route  every day dissolved in 2 to 6 ounces of water or noncarbonated beverage before meals   DHEA take 100mg daily   Duragesic 25 mcg/hr transdermal patch apply 1 patch by transdermal route  every 72 hours   losartan 100 mg-hydrochlorothiazide 25 mg tablet take 1 tablet by oral route  every day   magnesium oxide 400 mg capsule take 1 po BID   promethazine 25 mg tablet take 1 tablet by oral route  every  6 hours as needed   Vitamin D3 1,000 unit capsule take 1 po qd   Zenpep 40,000 unit-126,000 unit-168,000 unit capsule,delayed release take 1 capsule by oral route 3 times every day with meals and 1 capsule with each snack swallowing whole. Do not crush, chew and/or divide. Allergies:  Ingredient Reaction (Severity) Medication Name Comment   BUTORPHANOL TARTRATE jittery  STADOL. CODEINE jittery  CODEINE. DIPHENHYDRAMINE HCL jittery  BENADRYL. NALBUPHINE HCL jittery  NUBAIN. PENTAZOCINE HCL jittery  TALWIN COMPOUND. PERPHENAZINE jittery  TRILAFON. PROCHLORPERAZINE EDISYLATE jittery  COMPAZINE. Reviewed, no changes. Review of Systems:  System Neg/Pos Details   ENMT Negative Hearing deficit. Eyes Negative Vision changes. Respiratory Negative Dyspnea. Cardio Negative Chest pain and Irregular heartbeat/palpitations. GI Positive Abdominal pain, Nausea.  Negative Dysuria and Urinary frequency. Endocrine Negative Cold intolerance and Heat intolerance. Neuro Negative Confusion/disorientation, Dizziness, Headache and Seizures. Hema/Lymph Negative Easy bleeding. Vital Signs:  BP mm/Hg Pulse Resp Pulse Ox Temp F Ht (Total in.) Weight (lbs.) Weight (oz.) BMI   124/68 82 16  98.10 62.50 131.80  23.72     Physical Exam:  Weight is down 7lbs since last OV.     NEURO-alert and oriented x 3    HEENT-normal, no icterus, nl conjunctiva    LUNGS-clear to auscultation and percussion       COR-rrr w/o murmur,gallop,or rub        ABD-soft, non distended, good bowel sounds,non tender        EXT-w/o edema  Labs/X-Rays:  Labs 10/8/20: CBC and CMP normal.    Medications Prescribed/Renewed (this encounter):  Medication Directions   Duragesic 25 mcg/hr transdermal patch apply 1 patch by transdermal route  every 72 hours   Zenpep 40,000 unit-126,000 unit-168,000 unit capsule,delayed release take 1 capsule by oral route 3 times every day with meals and 1 capsule with each snack swallowing whole. Do not crush, chew and/or divide. Assessment/Plan:  # Detail Type Description    1. Assessment Lower abdominal pain (R10.30). 2. 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 3. Assessment Other chronic pancreatitis (K86.1). Plan Orders Further evaluations ordered today include ERCP to be performed, Next Lab Date is on 12/09/2020.         4. Assessment Nausea (R11.0). Provider Plan Patient has SOD with chronic abdominal pain which is worsening. Care has been difficult due to lack of fu at times and missed appointments. She thinks she benefitted from a stent back in 2017 and she did have less visits at that time, and she was not losing weight like she is now. In view of her current situation we will start her on pancreatic enzyme supplements. We will switch to a Duragesic patch for chronic pain control. She will continue Phenergan PRN. We will schedule her for ERCP with pancreatic stent placement. Fall Risk Plan:  The patient has fallen 1 times in the last year. The fall(s) resulted in injury. Counseling / Educational Factors:  Items reviewed / discussed during today's visit: prior testing / procedures were reviewed; testing was discussed in detail; old records were reviewed; indications and risks of the procedure were discussed; prescription medication usage was discussed; symptomatic care was discussed; advised to continue current treatment. Patient expressed understanding of instructions. The patient was checked out at 11:21 AM by Sammy Kidd. I personally performed the services described in this documentation.   Deborah EcheverriaScribe) assisted in my presence with documentation, which I have reviewed and validated. Provider:    Joey Jimenez  12/04/2020 11:53 AM   Document generated by: Einar Burkitt, MD 12/04/2020    CC Providers:  Damian Chua MD, MD  [facility]  [facility] [address]-      Electronically signed by Einar Burkitt MD on 12/04/2020 11:54 AM

## 2020-12-08 ENCOUNTER — ANESTHESIA EVENT (OUTPATIENT)
Dept: ENDOSCOPY | Age: 75
End: 2020-12-08
Payer: MEDICARE

## 2020-12-08 RX ORDER — HYDROMORPHONE HYDROCHLORIDE 2 MG/ML
0.5 INJECTION, SOLUTION INTRAMUSCULAR; INTRAVENOUS; SUBCUTANEOUS
Status: CANCELLED | OUTPATIENT
Start: 2020-12-08

## 2020-12-08 RX ORDER — SODIUM CHLORIDE, SODIUM LACTATE, POTASSIUM CHLORIDE, CALCIUM CHLORIDE 600; 310; 30; 20 MG/100ML; MG/100ML; MG/100ML; MG/100ML
75 INJECTION, SOLUTION INTRAVENOUS CONTINUOUS
Status: CANCELLED | OUTPATIENT
Start: 2020-12-08

## 2020-12-08 RX ORDER — OXYCODONE HYDROCHLORIDE 5 MG/1
5 TABLET ORAL
Status: CANCELLED | OUTPATIENT
Start: 2020-12-08 | End: 2020-12-09

## 2020-12-08 RX ORDER — NALOXONE HYDROCHLORIDE 0.4 MG/ML
0.2 INJECTION, SOLUTION INTRAMUSCULAR; INTRAVENOUS; SUBCUTANEOUS AS NEEDED
Status: CANCELLED | OUTPATIENT
Start: 2020-12-08

## 2020-12-08 RX ORDER — HALOPERIDOL 5 MG/ML
1 INJECTION INTRAMUSCULAR
Status: CANCELLED | OUTPATIENT
Start: 2020-12-08 | End: 2020-12-09

## 2020-12-09 ENCOUNTER — HOSPITAL ENCOUNTER (OUTPATIENT)
Age: 75
Setting detail: OUTPATIENT SURGERY
Discharge: HOME | End: 2020-12-09
Attending: INTERNAL MEDICINE | Admitting: INTERNAL MEDICINE
Payer: MEDICARE

## 2020-12-09 ENCOUNTER — APPOINTMENT (OUTPATIENT)
Dept: GENERAL RADIOLOGY | Age: 75
End: 2020-12-09
Attending: INTERNAL MEDICINE
Payer: MEDICARE

## 2020-12-09 ENCOUNTER — ANESTHESIA (OUTPATIENT)
Dept: ENDOSCOPY | Age: 75
End: 2020-12-09
Payer: MEDICARE

## 2020-12-09 VITALS
TEMPERATURE: 98 F | RESPIRATION RATE: 16 BRPM | DIASTOLIC BLOOD PRESSURE: 59 MMHG | SYSTOLIC BLOOD PRESSURE: 112 MMHG | WEIGHT: 131 LBS | BODY MASS INDEX: 23.21 KG/M2 | HEIGHT: 63 IN | OXYGEN SATURATION: 99 % | HEART RATE: 64 BPM

## 2020-12-09 PROCEDURE — 74011250636 HC RX REV CODE- 250/636: Performed by: INTERNAL MEDICINE

## 2020-12-09 PROCEDURE — 77030007288 HC DEV LOK BILI BSC -A: Performed by: INTERNAL MEDICINE

## 2020-12-09 PROCEDURE — 77030009038 HC CATH BILI STN RTVR BSC -C: Performed by: INTERNAL MEDICINE

## 2020-12-09 PROCEDURE — C2617 STENT, NON-COR, TEM W/O DEL: HCPCS | Performed by: INTERNAL MEDICINE

## 2020-12-09 PROCEDURE — 76040000026: Performed by: INTERNAL MEDICINE

## 2020-12-09 PROCEDURE — 77030037088 HC TUBE ENDOTRACH ORAL NSL COVD-A: Performed by: ANESTHESIOLOGY

## 2020-12-09 PROCEDURE — C1769 GUIDE WIRE: HCPCS | Performed by: INTERNAL MEDICINE

## 2020-12-09 PROCEDURE — 74011250636 HC RX REV CODE- 250/636: Performed by: NURSE ANESTHETIST, CERTIFIED REGISTERED

## 2020-12-09 PROCEDURE — 74011000250 HC RX REV CODE- 250: Performed by: NURSE ANESTHETIST, CERTIFIED REGISTERED

## 2020-12-09 PROCEDURE — 74330 X-RAY BILE/PANC ENDOSCOPY: CPT

## 2020-12-09 PROCEDURE — 77030039425 HC BLD LARYNG TRULITE DISP TELE -A: Performed by: ANESTHESIOLOGY

## 2020-12-09 PROCEDURE — 74011250636 HC RX REV CODE- 250/636: Performed by: ANESTHESIOLOGY

## 2020-12-09 PROCEDURE — 77030019908 HC STETH ESOPH SIMS -A: Performed by: ANESTHESIOLOGY

## 2020-12-09 PROCEDURE — 76060000032 HC ANESTHESIA 0.5 TO 1 HR: Performed by: INTERNAL MEDICINE

## 2020-12-09 PROCEDURE — 2709999900 HC NON-CHARGEABLE SUPPLY: Performed by: INTERNAL MEDICINE

## 2020-12-09 DEVICE — ZIMMON PANCREATIC STENT
Type: IMPLANTABLE DEVICE | Site: PANCREAS | Status: FUNCTIONAL
Brand: ZIMMON

## 2020-12-09 RX ORDER — DEXAMETHASONE SODIUM PHOSPHATE 4 MG/ML
INJECTION, SOLUTION INTRA-ARTICULAR; INTRALESIONAL; INTRAMUSCULAR; INTRAVENOUS; SOFT TISSUE AS NEEDED
Status: DISCONTINUED | OUTPATIENT
Start: 2020-12-09 | End: 2020-12-09 | Stop reason: HOSPADM

## 2020-12-09 RX ORDER — SODIUM CHLORIDE, SODIUM LACTATE, POTASSIUM CHLORIDE, CALCIUM CHLORIDE 600; 310; 30; 20 MG/100ML; MG/100ML; MG/100ML; MG/100ML
25 INJECTION, SOLUTION INTRAVENOUS CONTINUOUS
Status: DISCONTINUED | OUTPATIENT
Start: 2020-12-09 | End: 2020-12-09 | Stop reason: HOSPADM

## 2020-12-09 RX ORDER — ROCURONIUM BROMIDE 10 MG/ML
INJECTION, SOLUTION INTRAVENOUS AS NEEDED
Status: DISCONTINUED | OUTPATIENT
Start: 2020-12-09 | End: 2020-12-09 | Stop reason: HOSPADM

## 2020-12-09 RX ORDER — SODIUM CHLORIDE 0.9 % (FLUSH) 0.9 %
5-40 SYRINGE (ML) INJECTION AS NEEDED
Status: DISCONTINUED | OUTPATIENT
Start: 2020-12-09 | End: 2020-12-09 | Stop reason: HOSPADM

## 2020-12-09 RX ORDER — LIDOCAINE HYDROCHLORIDE 20 MG/ML
INJECTION, SOLUTION EPIDURAL; INFILTRATION; INTRACAUDAL; PERINEURAL AS NEEDED
Status: DISCONTINUED | OUTPATIENT
Start: 2020-12-09 | End: 2020-12-09 | Stop reason: HOSPADM

## 2020-12-09 RX ORDER — PROPOFOL 10 MG/ML
INJECTION, EMULSION INTRAVENOUS AS NEEDED
Status: DISCONTINUED | OUTPATIENT
Start: 2020-12-09 | End: 2020-12-09 | Stop reason: HOSPADM

## 2020-12-09 RX ORDER — LIDOCAINE HYDROCHLORIDE 10 MG/ML
0.1 INJECTION INFILTRATION; PERINEURAL AS NEEDED
Status: DISCONTINUED | OUTPATIENT
Start: 2020-12-09 | End: 2020-12-09 | Stop reason: HOSPADM

## 2020-12-09 RX ORDER — SUCCINYLCHOLINE CHLORIDE 20 MG/ML
INJECTION INTRAMUSCULAR; INTRAVENOUS AS NEEDED
Status: DISCONTINUED | OUTPATIENT
Start: 2020-12-09 | End: 2020-12-09 | Stop reason: HOSPADM

## 2020-12-09 RX ORDER — SODIUM CHLORIDE 0.9 % (FLUSH) 0.9 %
5-40 SYRINGE (ML) INJECTION EVERY 8 HOURS
Status: DISCONTINUED | OUTPATIENT
Start: 2020-12-09 | End: 2020-12-09 | Stop reason: HOSPADM

## 2020-12-09 RX ORDER — ONDANSETRON 2 MG/ML
INJECTION INTRAMUSCULAR; INTRAVENOUS AS NEEDED
Status: DISCONTINUED | OUTPATIENT
Start: 2020-12-09 | End: 2020-12-09 | Stop reason: HOSPADM

## 2020-12-09 RX ORDER — FENTANYL CITRATE 50 UG/ML
INJECTION, SOLUTION INTRAMUSCULAR; INTRAVENOUS AS NEEDED
Status: DISCONTINUED | OUTPATIENT
Start: 2020-12-09 | End: 2020-12-09 | Stop reason: HOSPADM

## 2020-12-09 RX ORDER — EPHEDRINE SULFATE/0.9% NACL/PF 50 MG/5 ML
SYRINGE (ML) INTRAVENOUS AS NEEDED
Status: DISCONTINUED | OUTPATIENT
Start: 2020-12-09 | End: 2020-12-09 | Stop reason: HOSPADM

## 2020-12-09 RX ADMIN — SODIUM CHLORIDE, SODIUM LACTATE, POTASSIUM CHLORIDE, AND CALCIUM CHLORIDE: 600; 310; 30; 20 INJECTION, SOLUTION INTRAVENOUS at 14:03

## 2020-12-09 RX ADMIN — ROCURONIUM BROMIDE 5 MG: 10 INJECTION, SOLUTION INTRAVENOUS at 14:11

## 2020-12-09 RX ADMIN — GLUCAGON 0.25 MG: 1 INJECTION, POWDER, LYOPHILIZED, FOR SOLUTION INTRAMUSCULAR; INTRAVENOUS at 14:24

## 2020-12-09 RX ADMIN — SUCCINYLCHOLINE CHLORIDE 120 MG: 20 INJECTION, SOLUTION INTRAMUSCULAR; INTRAVENOUS at 14:11

## 2020-12-09 RX ADMIN — FENTANYL CITRATE 50 MCG: 50 INJECTION INTRAMUSCULAR; INTRAVENOUS at 14:45

## 2020-12-09 RX ADMIN — FENTANYL CITRATE 50 MCG: 50 INJECTION INTRAMUSCULAR; INTRAVENOUS at 14:11

## 2020-12-09 RX ADMIN — PHENYLEPHRINE HYDROCHLORIDE 100 MCG: 10 INJECTION INTRAVENOUS at 14:21

## 2020-12-09 RX ADMIN — Medication 10 MG: at 14:21

## 2020-12-09 RX ADMIN — DEXAMETHASONE SODIUM PHOSPHATE 4 MG: 4 INJECTION, SOLUTION INTRAMUSCULAR; INTRAVENOUS at 14:34

## 2020-12-09 RX ADMIN — ONDANSETRON 4 MG: 2 INJECTION INTRAMUSCULAR; INTRAVENOUS at 14:34

## 2020-12-09 RX ADMIN — LIDOCAINE HYDROCHLORIDE 100 MG: 20 INJECTION, SOLUTION EPIDURAL; INFILTRATION; INTRACAUDAL; PERINEURAL at 14:11

## 2020-12-09 RX ADMIN — PROPOFOL 140 MG: 10 INJECTION, EMULSION INTRAVENOUS at 14:11

## 2020-12-09 NOTE — ROUTINE PROCESS
VSS. No complaints noted. Education given and reviewed with . Patient wheeled out via wheelchair by [employer], [facility].

## 2020-12-09 NOTE — ANESTHESIA PREPROCEDURE EVALUATION
Relevant Problems   No relevant active problems       Anesthetic History     PONV          Review of Systems / Medical History  Patient summary reviewed and pertinent labs reviewed    Pulmonary  Within defined limits                 Neuro/Psych     seizures (One in 1995)  CVA: no residual symptoms  Headaches (Migraine)     Cardiovascular    Hypertension: well controlled          Hyperlipidemia    Exercise tolerance: >4 METS  Comments: Denies CP, SOB or changes in functional status   GI/Hepatic/Renal     GERD: well controlled      PUD     Endo/Other      Hypothyroidism: well controlled  Arthritis     Other Findings              Physical Exam    Airway  Mallampati: II  TM Distance: 4 - 6 cm  Neck ROM: normal range of motion   Mouth opening: Normal     Cardiovascular    Rhythm: regular  Rate: normal         Dental    Dentition: Caps/crowns     Pulmonary  Breath sounds clear to auscultation               Abdominal  GI exam deferred       Other Findings            Anesthetic Plan    ASA: 3  Anesthesia type: general          Induction: Intravenous  Anesthetic plan and risks discussed with: Patient

## 2020-12-09 NOTE — ANESTHESIA POSTPROCEDURE EVALUATION
Procedure(s):  ENDOSCOPIC RETROGRADE CHOLANGIOPANCREATOGRAPHY (ERCP)  ENDOSCOPIC STONE EXTRACTION/BALLOON SWEEP  BILIARY STENT PLACEMENT. general    Anesthesia Post Evaluation      Multimodal analgesia: multimodal analgesia not used between 6 hours prior to anesthesia start to PACU discharge  Patient location during evaluation: bedside  Patient participation: complete - patient participated  Level of consciousness: awake  Pain score: 1  Pain management: adequate  Airway patency: patent  Anesthetic complications: no  Cardiovascular status: acceptable  Respiratory status: acceptable  Hydration status: acceptable  Comments: Pt doing well. Post anesthesia nausea and vomiting:  none  Final Post Anesthesia Temperature Assessment:  Normothermia (36.0-37.5 degrees C)      INITIAL Post-op Vital signs:   Vitals Value Taken Time   /56 12/9/2020  3:00 PM   Temp 36.7 °C (98 °F) 12/9/2020  2:52 PM   Pulse 71 12/9/2020  3:01 PM   Resp 12 12/9/2020  2:52 PM   SpO2 97 % 12/9/2020  3:01 PM   Vitals shown include unvalidated device data.

## 2020-12-09 NOTE — PROCEDURES
ENDOSCOPIC  RETROGRADE CHOLANGIOPANCREATOGRAPHY    DATE of PROCEDURE: 12/9/2020    PT NAME: Denita Spears     [id number]    MEDICATION: general; Glucagon 0.25 mg iv    INSTRUMENT:  [id number] VF    SPECIAL PROCEDURE: balloon sweep-12 mm -  BLOOD LOSS- 0 to min. SPEC- no  IMPLANT- none    PROCEDURE: After informed consent, the patient was placed under anesthesia in the semi-prone position. The duodenoscope was passed without difficulty to the area of the ampulla. A standard cannulation and ERCP was performed with the findings as outlined and described below. Patient tolerated the procedure well. ASSESSMENT:  1. Cloudy bile but normal CBD with 12 mm balloon sweep  2. Pancreas normal with patent papillotomy - swept with balloon and 5 fr/ 9 cm single pigtail stent placed  3. GB absent    PLAN:  1.  Follow up in office    Domingo Abad MD

## 2020-12-09 NOTE — DISCHARGE INSTRUCTIONS
Patient Education        Endoscopic Retrograde Cholangiopancreatogram (ERCP): What to Expect at [address] [facility]  After you have an endoscopic retrograde cholangiopancreatogram (ERCP), you probably will stay at the hospital or clinic for 1 to 2 hours. This will allow the medicine to wear off. You will be able to go home after your doctor or a nurse checks to make sure you are not having any problems. If you stay in the hospital overnight, you may go home the next day. You may have a sore throat for a day or two after the procedure. This care sheet gives you a general idea about how long it will take for you to recover. But each person recovers at a different pace. Follow the steps below to get better as quickly as possible. How can you care for yourself at home? Activity    · Rest as much as you need to after you go home.     · You should be able to go back to your usual activities the day after the procedure. Diet    · Follow your doctor's directions for eating after the procedure.     · Drink plenty of fluids (unless your doctor tells you not to). Medicines    · Your doctor will tell you if and when you can restart your medicines. He or she will also give you instructions about taking any new medicines.     · If you take aspirin or some other blood thinner, ask your doctor if and when to start taking it again. Make sure that you understand exactly what your doctor wants you to do.     · If you have a sore throat the next day, use an over-the-counter spray to numb your throat. Be safe with medicines. Read and follow all instructions on the label. Follow-up care is a key part of your treatment and safety. Be sure to make and go to all appointments, and call your doctor if you are having problems. It's also a good idea to know your test results and keep a list of the medicines you take. When should you call for help? Call 911 anytime you think you may need emergency care.  For example, call if:    · You passed out (lost consciousness).     · Your stools are maroon or very bloody.     · You have trouble breathing. Call your doctor now or go to the emergency room if:    · You have new or worse belly pain.     · You have pain that does not get better after you take pain medicine.     · You have a fever.     · You cannot pass stools or gas.     · You are sick to your stomach or cannot hold down fluids.     · You have blood in your stools. Watch closely for changes in your health, and be sure to contact your doctor if:    · Your throat still hurts after a day or two.     · You do not get better as expected. Where can you learn more? Go to http://www.gray.com/  Enter [id number] in the search box to learn more about \"Endoscopic Retrograde Cholangiopancreatogram (ERCP): What to Expect at Home. \"  Current as of: April 15, 2020               Content Version: 12.6  © 8440-9002 [employer]. Care instructions adapted under license by [employer] (which disclaims liability or warranty for this information). If you have questions about a medical condition or this instruction, always ask your healthcare professional. [address] any warranty or liability for your use of this information.

## 2020-12-09 NOTE — INTERVAL H&P NOTE
Update History & Physical 
 
The Patient's History and Physical of December 04, 2020 was reviewed with the patient and I examined the patient. There was no change. The surgical site was confirmed by the patient and me. Plan:  The risk, benefits, expected outcome, and alternative to the recommended procedure have been discussed with the patient. Patient understands and wants to proceed with the procedure.  
 
Electronically signed by Di Cristobal MD on 12/9/2020 at 1:43 PM

## 2021-02-22 ENCOUNTER — APPOINTMENT (RX ONLY)
Dept: URBAN - METROPOLITAN AREA CLINIC 24 | Facility: CLINIC | Age: 76
Setting detail: DERMATOLOGY
End: 2021-02-22

## 2021-02-22 DIAGNOSIS — L82.1 OTHER SEBORRHEIC KERATOSIS: ICD-10-CM

## 2021-02-22 DIAGNOSIS — L72.8 OTHER FOLLICULAR CYSTS OF THE SKIN AND SUBCUTANEOUS TISSUE: ICD-10-CM

## 2021-02-22 DIAGNOSIS — L57.8 OTHER SKIN CHANGES DUE TO CHRONIC EXPOSURE TO NONIONIZING RADIATION: ICD-10-CM

## 2021-02-22 DIAGNOSIS — L82.0 INFLAMED SEBORRHEIC KERATOSIS: ICD-10-CM

## 2021-02-22 DIAGNOSIS — Z71.89 OTHER SPECIFIED COUNSELING: ICD-10-CM

## 2021-02-22 DIAGNOSIS — L81.0 POSTINFLAMMATORY HYPERPIGMENTATION: ICD-10-CM

## 2021-02-22 DIAGNOSIS — Z87.2 PERSONAL HISTORY OF DISEASES OF THE SKIN AND SUBCUTANEOUS TISSUE: ICD-10-CM

## 2021-02-22 DIAGNOSIS — B02.29 OTHER POSTHERPETIC NERVOUS SYSTEM INVOLVEMENT: ICD-10-CM

## 2021-02-22 PROBLEM — H91.90 UNSPECIFIED HEARING LOSS, UNSPECIFIED EAR: Status: ACTIVE | Noted: 2021-02-22

## 2021-02-22 PROBLEM — E78.5 HYPERLIPIDEMIA, UNSPECIFIED: Status: ACTIVE | Noted: 2021-02-22

## 2021-02-22 PROBLEM — J30.1 ALLERGIC RHINITIS DUE TO POLLEN: Status: ACTIVE | Noted: 2021-02-22

## 2021-02-22 PROCEDURE — 17110 DESTRUCTION B9 LES UP TO 14: CPT

## 2021-02-22 PROCEDURE — ? OTHER

## 2021-02-22 PROCEDURE — 99214 OFFICE O/P EST MOD 30 MIN: CPT | Mod: 25

## 2021-02-22 PROCEDURE — ? PRESCRIPTION MEDICATION MANAGEMENT

## 2021-02-22 PROCEDURE — ? PRESCRIPTION

## 2021-02-22 PROCEDURE — ? LIQUID NITROGEN

## 2021-02-22 PROCEDURE — ? SUNSCREEN RECOMMENDATIONS

## 2021-02-22 PROCEDURE — ? COUNSELING

## 2021-02-22 RX ORDER — LIDOCAINE 50 MG/G
PATCH CUTANEOUS
Qty: 1 | Refills: 1 | COMMUNITY
Start: 2021-02-22

## 2021-02-22 RX ADMIN — LIDOCAINE: 50 PATCH CUTANEOUS at 00:00

## 2021-02-22 ASSESSMENT — LOCATION DETAILED DESCRIPTION DERM
LOCATION DETAILED: RIGHT INFERIOR LATERAL MIDBACK
LOCATION DETAILED: LEFT RIB CAGE
LOCATION DETAILED: RIGHT INFERIOR ANTERIOR NECK
LOCATION DETAILED: LEFT MID PREAURICULAR CHEEK
LOCATION DETAILED: LEFT INFRAMAMMARY CREASE (INNER QUADRANT)
LOCATION DETAILED: RIGHT SUPERIOR MEDIAL MIDBACK

## 2021-02-22 ASSESSMENT — LOCATION ZONE DERM
LOCATION ZONE: FACE
LOCATION ZONE: TRUNK
LOCATION ZONE: NECK

## 2021-02-22 ASSESSMENT — LOCATION SIMPLE DESCRIPTION DERM
LOCATION SIMPLE: LEFT BREAST
LOCATION SIMPLE: RIGHT ANTERIOR NECK
LOCATION SIMPLE: ABDOMEN
LOCATION SIMPLE: LEFT CHEEK
LOCATION SIMPLE: RIGHT LOWER BACK

## 2021-08-31 ENCOUNTER — ANESTHESIA EVENT (OUTPATIENT)
Dept: ENDOSCOPY | Age: 76
End: 2021-08-31
Payer: MEDICARE

## 2021-09-01 ENCOUNTER — HOSPITAL ENCOUNTER (OUTPATIENT)
Age: 76
Setting detail: OUTPATIENT SURGERY
Discharge: HOME | End: 2021-09-01
Attending: INTERNAL MEDICINE | Admitting: INTERNAL MEDICINE
Payer: MEDICARE

## 2021-09-01 ENCOUNTER — ANESTHESIA (OUTPATIENT)
Dept: ENDOSCOPY | Age: 76
End: 2021-09-01
Payer: MEDICARE

## 2021-09-01 ENCOUNTER — APPOINTMENT (OUTPATIENT)
Dept: GENERAL RADIOLOGY | Age: 76
End: 2021-09-01
Attending: INTERNAL MEDICINE
Payer: MEDICARE

## 2021-09-01 VITALS
HEART RATE: 84 BPM | WEIGHT: 120 LBS | HEIGHT: 63 IN | TEMPERATURE: 98.4 F | SYSTOLIC BLOOD PRESSURE: 128 MMHG | DIASTOLIC BLOOD PRESSURE: 60 MMHG | RESPIRATION RATE: 18 BRPM | BODY MASS INDEX: 21.26 KG/M2 | OXYGEN SATURATION: 97 %

## 2021-09-01 PROCEDURE — 77030032490 HC SLV COMPR SCD KNE COVD -B: Performed by: INTERNAL MEDICINE

## 2021-09-01 PROCEDURE — 77030037088 HC TUBE ENDOTRACH ORAL NSL COVD-A: Performed by: ANESTHESIOLOGY

## 2021-09-01 PROCEDURE — 74011250636 HC RX REV CODE- 250/636: Performed by: REGISTERED NURSE

## 2021-09-01 PROCEDURE — 74011250637 HC RX REV CODE- 250/637: Performed by: INTERNAL MEDICINE

## 2021-09-01 PROCEDURE — 77030007288 HC DEV LOK BILI BSC -A: Performed by: INTERNAL MEDICINE

## 2021-09-01 PROCEDURE — 74330 X-RAY BILE/PANC ENDOSCOPY: CPT

## 2021-09-01 PROCEDURE — 77030039425 HC BLD LARYNG TRULITE DISP TELE -A: Performed by: ANESTHESIOLOGY

## 2021-09-01 PROCEDURE — 74011000250 HC RX REV CODE- 250: Performed by: REGISTERED NURSE

## 2021-09-01 PROCEDURE — 74011000636 HC RX REV CODE- 636: Performed by: INTERNAL MEDICINE

## 2021-09-01 PROCEDURE — 74011250637 HC RX REV CODE- 250/637: Performed by: ANESTHESIOLOGY

## 2021-09-01 PROCEDURE — 76060000032 HC ANESTHESIA 0.5 TO 1 HR: Performed by: INTERNAL MEDICINE

## 2021-09-01 PROCEDURE — 2709999900 HC NON-CHARGEABLE SUPPLY: Performed by: INTERNAL MEDICINE

## 2021-09-01 PROCEDURE — 74011000250 HC RX REV CODE- 250: Performed by: ANESTHESIOLOGY

## 2021-09-01 PROCEDURE — 77030009038 HC CATH BILI STN RTVR BSC -C: Performed by: INTERNAL MEDICINE

## 2021-09-01 PROCEDURE — C1769 GUIDE WIRE: HCPCS | Performed by: INTERNAL MEDICINE

## 2021-09-01 PROCEDURE — 74011250636 HC RX REV CODE- 250/636: Performed by: ANESTHESIOLOGY

## 2021-09-01 PROCEDURE — 77030013991 HC SNR POLYP ENDOSC BSC -A: Performed by: INTERNAL MEDICINE

## 2021-09-01 PROCEDURE — 76040000026: Performed by: INTERNAL MEDICINE

## 2021-09-01 RX ORDER — OXYCODONE HYDROCHLORIDE 5 MG/1
5 TABLET ORAL
Status: COMPLETED | OUTPATIENT
Start: 2021-09-01 | End: 2021-09-01

## 2021-09-01 RX ORDER — LIDOCAINE HYDROCHLORIDE 20 MG/ML
INJECTION, SOLUTION EPIDURAL; INFILTRATION; INTRACAUDAL; PERINEURAL AS NEEDED
Status: DISCONTINUED | OUTPATIENT
Start: 2021-09-01 | End: 2021-09-01 | Stop reason: HOSPADM

## 2021-09-01 RX ORDER — HYDROMORPHONE HYDROCHLORIDE 1 MG/ML
0.5 INJECTION, SOLUTION INTRAMUSCULAR; INTRAVENOUS; SUBCUTANEOUS
Status: COMPLETED | OUTPATIENT
Start: 2021-09-01 | End: 2021-09-01

## 2021-09-01 RX ORDER — DEXAMETHASONE SODIUM PHOSPHATE 4 MG/ML
INJECTION, SOLUTION INTRA-ARTICULAR; INTRALESIONAL; INTRAMUSCULAR; INTRAVENOUS; SOFT TISSUE AS NEEDED
Status: DISCONTINUED | OUTPATIENT
Start: 2021-09-01 | End: 2021-09-01 | Stop reason: HOSPADM

## 2021-09-01 RX ORDER — NALOXONE HYDROCHLORIDE 0.4 MG/ML
0.04 INJECTION, SOLUTION INTRAMUSCULAR; INTRAVENOUS; SUBCUTANEOUS
Status: DISCONTINUED | OUTPATIENT
Start: 2021-09-01 | End: 2021-09-01 | Stop reason: HOSPADM

## 2021-09-01 RX ORDER — ONDANSETRON 2 MG/ML
4 INJECTION INTRAMUSCULAR; INTRAVENOUS ONCE
Status: COMPLETED | OUTPATIENT
Start: 2021-09-01 | End: 2021-09-01

## 2021-09-01 RX ORDER — ROCURONIUM BROMIDE 10 MG/ML
INJECTION, SOLUTION INTRAVENOUS AS NEEDED
Status: DISCONTINUED | OUTPATIENT
Start: 2021-09-01 | End: 2021-09-01 | Stop reason: HOSPADM

## 2021-09-01 RX ORDER — FENTANYL CITRATE 50 UG/ML
100 INJECTION, SOLUTION INTRAMUSCULAR; INTRAVENOUS ONCE
Status: DISCONTINUED | OUTPATIENT
Start: 2021-09-01 | End: 2021-09-01 | Stop reason: HOSPADM

## 2021-09-01 RX ORDER — NORETHINDRONE AND ETHINYL ESTRADIOL 0.5-0.035
25 KIT ORAL ONCE
Status: COMPLETED | OUTPATIENT
Start: 2021-09-01 | End: 2021-09-01

## 2021-09-01 RX ORDER — SODIUM CHLORIDE, SODIUM LACTATE, POTASSIUM CHLORIDE, CALCIUM CHLORIDE 600; 310; 30; 20 MG/100ML; MG/100ML; MG/100ML; MG/100ML
100 INJECTION, SOLUTION INTRAVENOUS CONTINUOUS
Status: DISCONTINUED | OUTPATIENT
Start: 2021-09-01 | End: 2021-09-01 | Stop reason: HOSPADM

## 2021-09-01 RX ORDER — PROPOFOL 10 MG/ML
INJECTION, EMULSION INTRAVENOUS AS NEEDED
Status: DISCONTINUED | OUTPATIENT
Start: 2021-09-01 | End: 2021-09-01 | Stop reason: HOSPADM

## 2021-09-01 RX ORDER — MIDAZOLAM HYDROCHLORIDE 1 MG/ML
2 INJECTION, SOLUTION INTRAMUSCULAR; INTRAVENOUS ONCE
Status: DISCONTINUED | OUTPATIENT
Start: 2021-09-01 | End: 2021-09-01 | Stop reason: HOSPADM

## 2021-09-01 RX ORDER — KETOROLAC TROMETHAMINE 15 MG/ML
15 INJECTION, SOLUTION INTRAMUSCULAR; INTRAVENOUS ONCE
Status: COMPLETED | OUTPATIENT
Start: 2021-09-01 | End: 2021-09-01

## 2021-09-01 RX ORDER — HYDROMORPHONE HYDROCHLORIDE 2 MG/ML
1 INJECTION, SOLUTION INTRAMUSCULAR; INTRAVENOUS; SUBCUTANEOUS
Status: COMPLETED | OUTPATIENT
Start: 2021-09-01 | End: 2021-09-01

## 2021-09-01 RX ORDER — MIDAZOLAM HYDROCHLORIDE 1 MG/ML
2 INJECTION, SOLUTION INTRAMUSCULAR; INTRAVENOUS
Status: DISCONTINUED | OUTPATIENT
Start: 2021-09-01 | End: 2021-09-01 | Stop reason: HOSPADM

## 2021-09-01 RX ORDER — LIDOCAINE HYDROCHLORIDE 10 MG/ML
0.1 INJECTION INFILTRATION; PERINEURAL AS NEEDED
Status: DISCONTINUED | OUTPATIENT
Start: 2021-09-01 | End: 2021-09-01 | Stop reason: HOSPADM

## 2021-09-01 RX ORDER — ONDANSETRON 2 MG/ML
INJECTION INTRAMUSCULAR; INTRAVENOUS AS NEEDED
Status: DISCONTINUED | OUTPATIENT
Start: 2021-09-01 | End: 2021-09-01 | Stop reason: HOSPADM

## 2021-09-01 RX ADMIN — INDOMETHACIN 100 MG: 50 SUPPOSITORY RECTAL at 13:22

## 2021-09-01 RX ADMIN — HYDROMORPHONE HYDROCHLORIDE 0.5 MG: 1 INJECTION, SOLUTION INTRAMUSCULAR; INTRAVENOUS; SUBCUTANEOUS at 14:31

## 2021-09-01 RX ADMIN — SODIUM CHLORIDE, SODIUM LACTATE, POTASSIUM CHLORIDE, AND CALCIUM CHLORIDE 100 ML/HR: 600; 310; 30; 20 INJECTION, SOLUTION INTRAVENOUS at 11:49

## 2021-09-01 RX ADMIN — ONDANSETRON 4 MG: 2 INJECTION INTRAMUSCULAR; INTRAVENOUS at 13:29

## 2021-09-01 RX ADMIN — HYDROMORPHONE HYDROCHLORIDE 1 MG: 2 INJECTION INTRAMUSCULAR; INTRAVENOUS; SUBCUTANEOUS at 15:12

## 2021-09-01 RX ADMIN — KETOROLAC TROMETHAMINE 15 MG: 15 INJECTION, SOLUTION INTRAMUSCULAR; INTRAVENOUS at 15:12

## 2021-09-01 RX ADMIN — EPHEDRINE SULFATE 25 MG: 50 INJECTION INTRAVENOUS at 14:03

## 2021-09-01 RX ADMIN — OXYCODONE 5 MG: 5 TABLET ORAL at 16:23

## 2021-09-01 RX ADMIN — ROCURONIUM BROMIDE 50 MG: 10 INJECTION, SOLUTION INTRAVENOUS at 13:08

## 2021-09-01 RX ADMIN — LIDOCAINE HYDROCHLORIDE 100 MG: 20 INJECTION, SOLUTION EPIDURAL; INFILTRATION; INTRACAUDAL; PERINEURAL at 13:08

## 2021-09-01 RX ADMIN — SUGAMMADEX 200 MG: 100 INJECTION, SOLUTION INTRAVENOUS at 13:36

## 2021-09-01 RX ADMIN — IOPAMIDOL 6.5 ML: 755 INJECTION, SOLUTION INTRAVENOUS at 13:32

## 2021-09-01 RX ADMIN — HYDROMORPHONE HYDROCHLORIDE 0.5 MG: 1 INJECTION, SOLUTION INTRAMUSCULAR; INTRAVENOUS; SUBCUTANEOUS at 14:18

## 2021-09-01 RX ADMIN — HYDROMORPHONE HYDROCHLORIDE 0.5 MG: 1 INJECTION, SOLUTION INTRAMUSCULAR; INTRAVENOUS; SUBCUTANEOUS at 14:00

## 2021-09-01 RX ADMIN — HYDROMORPHONE HYDROCHLORIDE 0.5 MG: 1 INJECTION, SOLUTION INTRAMUSCULAR; INTRAVENOUS; SUBCUTANEOUS at 13:51

## 2021-09-01 RX ADMIN — PROPOFOL 150 MG: 10 INJECTION, EMULSION INTRAVENOUS at 13:08

## 2021-09-01 RX ADMIN — DEXAMETHASONE SODIUM PHOSPHATE 4 MG: 4 INJECTION, SOLUTION INTRAMUSCULAR; INTRAVENOUS at 13:29

## 2021-09-01 RX ADMIN — ONDANSETRON 4 MG: 2 INJECTION INTRAMUSCULAR; INTRAVENOUS at 14:04

## 2021-09-01 NOTE — H&P
Patient:   Jose Angel Eisenberg  YOB: 1945   Date:                        08/03/2021 11:00 AM   Visit Type:                  Office Visit  Provider:   Jigna Contreras MD  Referring Provider:  Hiral Rodriguez MD [facility]  Primary Care Provider: Hiral Rodriguez MD [facility]    This 78year old  patient was referred by Hiral Rodriguez MD.  This 78year old female presents for chronic pancreatitis and Abdominal pain. History of Present Illness:  1.  chronic pancreatitis   2. Abdominal pain   Mrs. Spears is a 78year old female who presents to follow up for chronic pancreatitis, weight loss, and dysuria. She was last seen on 4/19/21 and was recommended to check labs, check a Urinalysis, and schedule an Abdominal Ultrasound. Labs 4/19/21: Normal CBC with exception of WBC 11.7, BUN 7, and GLU 64    Patient is accompanied by her  today. Patient reports that she passed her kidney stone and that was the reason for canceling the Ultrasound. She has abdominal pain every day that worsens with meals. Her weight has stayed the same since her last appointment about 4 months ago. No additional GI complaints at this time.     Past Medical/Surgical History:   (Detailed)  Disease/disorder Onset Date Management Date Comments   Pancreatitis 12/09/2020 ERCP- pancreatic stent     SOD 06/19/2017 ERCP- pancreatic stent     Stroke 2014   BLR 09/11/2014 - Left sided weakness   Esophageal stricture 2011 EGD with dilation 2011    Sphincter of OD dysfunction 1994 ERCP with sphincterotomy 1994    stent removal  EGD 01/12/2018      Nissen Fundoplication 2036 BLR 85/61/3058 -     EGD with foreign body removal 10/2011 pancreatic stent removed     ERCP with sphincterotomy-Pancreatic stent 2011    Gastritis  EGD 9/2011 Bile gastritis   Thyroid disease       Gastroesophageal reflux disease       Hypertension         Cholecystectomy       Additional Medical History  IBS- diarrhea predominant   Fibrocystic breast disease  Hypothyroid  GERD  PUD- h pylori -neg   HX TIA  2014  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  MRI showed mild pancreatic ductal abnormalities with possible chronic pancreatitis in   Kidney stone passed May 2021    Additional Surgical History  Left breast lumpectomy (Benign):   Right needle breast biopsy (benign):   Total abdomen hysterectomy with bilateral salpingo-oophorectomy:  Left shoulder surgery:  [id number] fundoplication in [facility] in     Procedure History  Test Date Results Interp   EGD 2017 Schatzki's Ring, History of Nissen fundoplication, Gastric erosion determined by endoscopy    COLONOSCOPY 10/25/2016 Diverticulosis large intestine w/o perforation or, Internal hemorrhoids    EGD 2011 Gastric ulcer, unspec, Esophageal ulcer w/o bleeding, Esophageal stricture    EGD 2011 Esophageal stricture, Duodenitis      Colonoscopy May 9, 2006 for diarrhea: diverticulosis, internal hemorrhoids. Random biopsies showed focal acute colitis. Colonoscopy surveillance . Colonoscopy 10/13/09 CSY- normal (random bx normal) (surv 2016)  ERCP  SOD dysfunction    Family History:  (Detailed)  Relationship Family Member Name  Age at Death Condition Onset Age Cause of Death       No family history of Colon polyps  N       No family history of Cancer, colon  N       Social History:  (Detailed)  Homemaker. Preferred language is [place]. MARITAL STATUS/FAMILY/SOCIAL SUPPORT  Currently . CHILDREN  Has children:  1 son(s). 1 daughter(s). Tobacco use status: Never smoked tobacco.  Smoking status: Never smoker. ALCOHOL  There is no history of alcohol use. HOME ENVIRONMENT/SAFETY  The patient has not fallen in the last year.     Current Medications:  Medication Directions   Timur Thyroid take 150mg daily   Aspir-81 mg tablet,delayed release take 1 tablet by oral route  every day carvedilol 25 mg tablet take 1 tablet by oral route 2 times every day with food   colestipol 5 gram oral packet take 1 packet by oral route  every day before meals added to at least 3 oz of water or other liquid, soup, cereal or pulpyfruit and stirred until completely mixed   DHEA take 100mg daily   losartan 100 mg-hydrochlorothiazide 25 mg tablet take 1 tablet by oral route  every day   magnesium oxide 400 mg capsule take 1 po BID   promethazine 25 mg tablet take 1 tablet by oral route  every  6 hours as needed   Vitamin D3 1,000 unit capsule take 1 po qd     Allergies:  Ingredient Reaction (Severity) Medication Name Comment   BUTORPHANOL TARTRATE jittery  STADOL. CODEINE jittery  CODEINE. DIPHENHYDRAMINE HCL jittery  BENADRYL. NALBUPHINE HCL jittery  NUBAIN. PENTAZOCINE HCL jittery  TALWIN COMPOUND. PERPHENAZINE jittery  TRILAFON. PROCHLORPERAZINE EDISYLATE jittery  COMPAZINE. Reviewed, no changes. Review of Systems:  System Neg/Pos Details   ENMT Negative Hearing deficit. Eyes Negative Vision changes. Respiratory Negative Dyspnea. Cardio Negative Chest pain and Irregular heartbeat/palpitations. GI Positive Abdominal pain, Diarrhea, Nausea.  Negative Dysuria and Urinary frequency. Endocrine Negative Cold intolerance and Heat intolerance. Neuro Negative Confusion/disorientation, Dizziness, Headache and Seizures. Hema/Lymph Negative Easy bleeding. Vital Signs:  BP mm/Hg Pulse Resp Pulse Ox Temp F Ht (Total in.) Weight (lbs.) Weight (oz.) BMI   142/76 74 16 98 97.40 62.00 127.20  23.26     Physical Exam:  Weight has remained stable. Patient is healthy-appearing in no acute distress.   A & O  l- clear  c- rrr    Medications Prescribed/Renewed (this encounter):  Medication Directions   colestipol 5 gram oral packet take 1 packet by oral route  every day before meals added to at least 3 oz of water or other liquid, soup, cereal or pulpyfruit and stirred until completely mixed promethazine 25 mg tablet take 1 tablet by oral route  every  6 hours as needed     Assessment/Plan:  # Detail Type Description    1. 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 Plan Orders She is to schedule a follow-up visit to be determined after testing/procedure. 2. Assessment Nausea (R11.0). Plan Orders Further evaluations ordered today include ERCP to be performed. 3. Assessment Diarrhea in adult patient (R19.7). Provider Plan Patient has had a flare of her pancreatic symptoms. She had done well since last stent placement in December. Request more Phenergan. Has known SOD with previous sphincterotomy. After review of her records, will proceed with follow up ERCP with balloon sweep and likely remove her stent and leave it out for a while. Fall Risk Plan:  The patient has not fallen in the last year. Counseling / Educational Factors:  Items reviewed / discussed during today's visit: prior testing / procedures were reviewed; testing was discussed in detail; old records were reviewed; indications and risks of the procedure were discussed; prescription medication usage was discussed; symptomatic care was discussed; advised to continue current treatment; reassurance of findings given; patient instructed to call for results of diagnostic testing. Patient expressed understanding of instructions. The patient was checked out at 11:18 AM by Tracy Lopez. I personally performed the services described in this documentation. Neto EcheverriaScribe) assisted in my presence with documentation, which I have reviewed and validated. Provider:    Young Patiño  08/03/2021 11:31 AM   Document generated by: Esther Hernandez MD 08/03/2021    CC Providers:  Jose Forrest MD, MD  [facility]  [facility] [address] [place] [address],  [address] [place] [address] Road-      Electronically signed by Esther Hernandez MD on 08/03/2021 11:31 AM

## 2021-09-01 NOTE — PROCEDURES
ENDOSCOPIC  RETROGRADE CHOLANGIOPANCREATOGRAPHY    DATE of PROCEDURE: 9/1/2021    PT NAME: Daniel Spears     [id number]    MEDICATION: general;     INSTRUMENT:  [id number]    SPECIAL PROCEDURE:stent removalw/ 12 mm balloon sweep- biliary and pancreatic  BLOOD LOSS- 0 to min. SPEC- no  IMPLANT- none    PROCEDURE: After informed consent, the patient was placed under anesthesia in the semi-prone position. The duodenoscope was passed without difficulty to the area of the ampulla. A standard cannulation and ERCP was performed with the findings as outlined and described below. Patient tolerated the procedure well. ASSESSMENT:  1. Pancreatic stent removed  2. Pancreas- swept with 12 mm balloon- no stones; minor stricture at junction of head and body  3. GB absent  4. CBD normal  5.  Antral erosions    PLAN:  1. F/u 3 months  2. echo Samano MD

## 2021-09-01 NOTE — ANESTHESIA POSTPROCEDURE EVALUATION
Procedure(s):  ENDOSCOPIC RETROGRADE CHOLANGIOPANCREATOGRAPHY (ERCP)  ENDOSCOPY WITH PROSTHESIS OR STENT REMOVAL  ENDOSCOPIC SPHINCTEROTOMY  ENDOSCOPIC STONE EXTRACTION/BALLOON SWEEP. general    Anesthesia Post Evaluation      Multimodal analgesia: multimodal analgesia used between 6 hours prior to anesthesia start to PACU discharge  Patient location during evaluation: PACU  Patient participation: complete - patient participated  Level of consciousness: awake  Pain management: adequate  Airway patency: patent  Anesthetic complications: no  Cardiovascular status: acceptable and hemodynamically stable  Respiratory status: acceptable  Hydration status: acceptable  Comments: Acceptable for discharge from PACU. Post anesthesia nausea and vomiting:  none  Final Post Anesthesia Temperature Assessment:  Normothermia (36.0-37.5 degrees C)      INITIAL Post-op Vital signs:   Vitals Value Taken Time   /67 09/01/21 1556   Temp 36.7 °C (98 °F) 09/01/21 1353   Pulse 72 09/01/21 1600   Resp 18 09/01/21 1428   SpO2 98 % 09/01/21 1600   Vitals shown include unvalidated device data.

## 2021-09-01 NOTE — ANESTHESIA PREPROCEDURE EVALUATION
Relevant Problems   NEUROLOGY   (+) CVA (cerebrovascular accident) ([facility] [address]   (+) Conversion disorder      CARDIOVASCULAR   (+) Essential hypertension      GASTROINTESTINAL   (+) GERD (gastroesophageal reflux disease)      ENDOCRINE   (+) Hypothyroidism       Anesthetic History     PONV          Review of Systems / Medical History  Patient summary reviewed and pertinent labs reviewed    Pulmonary  Within defined limits                 Neuro/Psych     seizures (One in 1995)  CVA (2014): no residual symptoms  Headaches (Migraine)     Cardiovascular    Hypertension: well controlled          Hyperlipidemia    Exercise tolerance: >4 METS  Comments: Denies CP, SOB or changes in functional status    Unremarkable echo 2015 and cath 2014   GI/Hepatic/Renal     GERD: well controlled    Renal disease: stones  Hiatal hernia (s/p Nissen) and PUD     Endo/Other      Hypothyroidism: well controlled  Arthritis     Other Findings              Physical Exam    Airway  Mallampati: II  TM Distance: 4 - 6 cm  Neck ROM: normal range of motion   Mouth opening: Normal     Cardiovascular    Rhythm: regular  Rate: normal         Dental    Dentition: Caps/crowns     Pulmonary  Breath sounds clear to auscultation               Abdominal  GI exam deferred       Other Findings            Anesthetic Plan    ASA: 3  Anesthesia type: general          Induction: Intravenous  Anesthetic plan and risks discussed with: Patient      Pt had a GI bug a couple weeks ago and became dehydrated and fell in the bathroom. She has mild facial bruising around the left eye and on her left side.

## 2021-09-01 NOTE — DISCHARGE INSTRUCTIONS
You received a medication, called Sugammadex, in the OR; this medication has been known to interfere with oral contraceptives (birth control pills). Please use a back up birth control method for a minimum of 7 days. Endoscopic Retrograde Cholangiopancreatogram (ERCP): What to Expect at [address] [place] [address]  After you have an endoscopic retrograde cholangiopancreatogram (ERCP). You will be able to go home after your doctor or a nurse checks to make sure you are not having any problems. If you stay in the hospital overnight, you may go home the next day. You may have a sore throat for a day or two after the procedure. This care sheet gives you a general idea about how long it will take for you to recover. But each person recovers at a different pace. Follow the steps below to get better as quickly as possible. How can you care for yourself at home? Activity  · Rest as much as you need to after you go home. · You should be able to go back to your usual activities the day after the procedure. Diet  · Follow your doctor's directions for eating after the procedure. · Drink plenty of fluids (unless your doctor tells you not to). Medicines  · If you have a sore throat the next day, use an over-the-counter spray to numb your throat. Medication Interaction:  During your procedure you potentially received a medication or medications which may reduce the effectiveness of oral contraceptives. Please consider other forms of contraception for 1 month following your procedure if you are currently using oral contraceptives as your primary form of birth control. In addition to this, we recommend continuing your oral contraceptive as prescribed, unless otherwise instructed by your physician, during this time. Follow-up care is a key part of your treatment and safety. Be sure to make and go to all appointments, and call your doctor if you are having problems.  Its also a good idea to know your test results and keep a list of the medicines you take. When should you call for help? Call 911 anytime you think you may need emergency care. For example, call if:  · You vomit blood or what looks like coffee grounds. · You pass maroon or bloody stools. Call your doctor now or go to the emergency room if:  · You have trouble swallowing. · You have belly pain. · Your stools are black and tarlike or have streaks of blood. · You are sick to your stomach and cannot drink fluids. · You have a fever. · You have pain that does not get better after you take your pain medicine. Watch closely for changes in your health, and be sure to contact your doctor if:  · Your throat still hurts after a day or two. You do not get better as expected. After general anesthesia or intravenous sedation, for 24 hours or while taking prescription Narcotics:  · Limit your activities  · A responsible adult needs to be with you for the next 24 hours  · Do not drive and operate hazardous machinery  · Do not make important personal or business decisions  · Do not drink alcoholic beverages  · If you have not urinated within 8 hours after discharge, and you are experiencing discomfort from urinary retention, please go to the nearest ED. · If you have sleep apnea and have a CPAP machine, please use it for all naps and sleeping. · Please use caution when taking narcotics and any of your home medications that may cause drowsiness. *  Please give a list of your current medications to your Primary Care Provider. *  Please update this list whenever your medications are discontinued, doses are      changed, or new medications (including over-the-counter products) are added. *  Please carry medication information at all times in case of emergency situations.     These are general instructions for a healthy lifestyle:  No smoking/ No tobacco products/ Avoid exposure to second hand smoke  Surgeon General's Warning:  Quitting smoking now greatly reduces serious risk to your health. Obesity, smoking, and sedentary lifestyle greatly increases your risk for illness  A healthy diet, regular physical exercise & weight monitoring are important for maintaining a healthy lifestyle    You may be retaining fluid if you have a history of heart failure or if you experience any of the following symptoms:  Weight gain of 3 pounds or more overnight or 5 pounds in a week, increased swelling in our hands or feet or shortness of breath while lying flat in bed. Please call your doctor as soon as you notice any of these symptoms; do not wait until your next office visit.

## 2021-09-01 NOTE — INTERVAL H&P NOTE
Update History & Physical    The Patient's History and Physical of August 3,   2021 was reviewed with the patient and I examined the patient. There was no change. The surgical site was confirmed by the patient and me. Plan:  The risk, benefits, expected outcome, and alternative to the recommended procedure have been discussed with the patient. Patient understands and wants to proceed with the procedure.     Electronically signed by Jaylin Junior MD on 9/1/2021 at 12:54 PM

## 2021-09-02 NOTE — PROGRESS NOTES
Late Entry    Spiritual Care visit. Initial Visit, Pre Surgery Consult. Visit and prayer before patient goes to surgery.     Visit by Luke Soto M.Ed., Th.B. ,Staff

## 2021-11-14 ENCOUNTER — HOSPITAL ENCOUNTER (INPATIENT)
Age: 76
LOS: 5 days | Discharge: HOME HEALTH | DRG: 085 | End: 2021-11-19
Attending: EMERGENCY MEDICINE | Admitting: INTERNAL MEDICINE
Payer: MEDICARE

## 2021-11-14 ENCOUNTER — APPOINTMENT (OUTPATIENT)
Dept: CT IMAGING | Age: 76
DRG: 085 | End: 2021-11-14
Attending: EMERGENCY MEDICINE
Payer: MEDICARE

## 2021-11-14 DIAGNOSIS — R10.13 ABDOMINAL PAIN, EPIGASTRIC: Primary | ICD-10-CM

## 2021-11-14 DIAGNOSIS — S06.5XAA SUBDURAL HEMATOMA: ICD-10-CM
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
## 2021-11-14 DIAGNOSIS — S02.32XA CLOSED FRACTURE OF LEFT ORBITAL FLOOR, INITIAL ENCOUNTER (HCC): ICD-10-CM

## 2021-11-14 DIAGNOSIS — I62.00 SUBDURAL HEMORRHAGE (HCC): ICD-10-CM

## 2021-11-14 DIAGNOSIS — S02.401A CLOSED FRACTURE OF MAXILLARY SINUS, INITIAL ENCOUNTER (HCC): ICD-10-CM

## 2021-11-14 DIAGNOSIS — E87.20 LACTIC ACIDOSIS: ICD-10-CM

## 2021-11-14 DIAGNOSIS — S02.32XS CLOSED FRACTURE OF LEFT ORBITAL FLOOR, SEQUELA (HCC): ICD-10-CM

## 2021-11-14 PROBLEM — R10.9 ABDOMINAL PAIN: Status: ACTIVE | Noted: 2021-11-14

## 2021-11-14 LAB
ALBUMIN SERPL-MCNC: 3.3 G/DL (ref 3.2–4.6)
ALBUMIN/GLOB SERPL: 0.8 {RATIO} (ref 1.2–3.5)
ALP SERPL-CCNC: 109 U/L (ref 50–136)
ALT SERPL-CCNC: 21 U/L (ref 12–65)
ANION GAP SERPL CALC-SCNC: 11 MMOL/L (ref 7–16)
AST SERPL-CCNC: 27 U/L (ref 15–37)
BASOPHILS # BLD: 0.1 K/UL (ref 0–0.2)
BASOPHILS NFR BLD: 1 % (ref 0–2)
BILIRUB SERPL-MCNC: 0.5 MG/DL (ref 0.2–1.1)
BUN SERPL-MCNC: 6 MG/DL (ref 8–23)
CALCIUM SERPL-MCNC: 8.5 MG/DL (ref 8.3–10.4)
CHLORIDE SERPL-SCNC: 99 MMOL/L (ref 98–107)
CO2 SERPL-SCNC: 22 MMOL/L (ref 21–32)
CREAT SERPL-MCNC: 0.79 MG/DL (ref 0.6–1)
DIFFERENTIAL METHOD BLD: ABNORMAL
EOSINOPHIL # BLD: 0.1 K/UL (ref 0–0.8)
EOSINOPHIL NFR BLD: 1 % (ref 0.5–7.8)
ERYTHROCYTE [DISTWIDTH] IN BLOOD BY AUTOMATED COUNT: 14.6 % (ref 11.9–14.6)
GLOBULIN SER CALC-MCNC: 4.2 G/DL (ref 2.3–3.5)
GLUCOSE SERPL-MCNC: 70 MG/DL (ref 65–100)
HCT VFR BLD AUTO: 39.5 % (ref 35.8–46.3)
HGB BLD-MCNC: 13.5 G/DL (ref 11.7–15.4)
IMM GRANULOCYTES # BLD AUTO: 0 K/UL (ref 0–0.5)
IMM GRANULOCYTES NFR BLD AUTO: 0 % (ref 0–5)
LACTATE SERPL-SCNC: 3.2 MMOL/L (ref 0.4–2)
LIPASE SERPL-CCNC: 80 U/L (ref 73–393)
LYMPHOCYTES # BLD: 2.4 K/UL (ref 0.5–4.6)
LYMPHOCYTES NFR BLD: 39 % (ref 13–44)
MAGNESIUM SERPL-MCNC: 1.9 MG/DL (ref 1.8–2.4)
MCH RBC QN AUTO: 31.8 PG (ref 26.1–32.9)
MCHC RBC AUTO-ENTMCNC: 34.2 G/DL (ref 31.4–35)
MCV RBC AUTO: 92.9 FL (ref 79.6–97.8)
MONOCYTES # BLD: 0.6 K/UL (ref 0.1–1.3)
MONOCYTES NFR BLD: 9 % (ref 4–12)
NEUTS SEG # BLD: 3.1 K/UL (ref 1.7–8.2)
NEUTS SEG NFR BLD: 50 % (ref 43–78)
NRBC # BLD: 0 K/UL (ref 0–0.2)
PLATELET # BLD AUTO: 286 K/UL (ref 150–450)
PMV BLD AUTO: 9.2 FL (ref 9.4–12.3)
POTASSIUM SERPL-SCNC: 4.4 MMOL/L (ref 3.5–5.1)
PROT SERPL-MCNC: 7.5 G/DL (ref 6.3–8.2)
RBC # BLD AUTO: 4.25 M/UL (ref 4.05–5.2)
SODIUM SERPL-SCNC: 132 MMOL/L (ref 136–145)
WBC # BLD AUTO: 6.2 K/UL (ref 4.3–11.1)

## 2021-11-14 PROCEDURE — 72125 CT NECK SPINE W/O DYE: CPT

## 2021-11-14 PROCEDURE — 99284 EMERGENCY DEPT VISIT MOD MDM: CPT

## 2021-11-14 PROCEDURE — 83605 ASSAY OF LACTIC ACID: CPT

## 2021-11-14 PROCEDURE — 80053 COMPREHEN METABOLIC PANEL: CPT

## 2021-11-14 PROCEDURE — 83690 ASSAY OF LIPASE: CPT

## 2021-11-14 PROCEDURE — 85025 COMPLETE CBC W/AUTO DIFF WBC: CPT

## 2021-11-14 PROCEDURE — 74011250637 HC RX REV CODE- 250/637: Performed by: EMERGENCY MEDICINE

## 2021-11-14 PROCEDURE — 96375 TX/PRO/DX INJ NEW DRUG ADDON: CPT

## 2021-11-14 PROCEDURE — 74011250636 HC RX REV CODE- 250/636: Performed by: INTERNAL MEDICINE

## 2021-11-14 PROCEDURE — 74011000258 HC RX REV CODE- 258: Performed by: EMERGENCY MEDICINE

## 2021-11-14 PROCEDURE — 70450 CT HEAD/BRAIN W/O DYE: CPT

## 2021-11-14 PROCEDURE — 74011250636 HC RX REV CODE- 250/636: Performed by: EMERGENCY MEDICINE

## 2021-11-14 PROCEDURE — 74011000250 HC RX REV CODE- 250: Performed by: EMERGENCY MEDICINE

## 2021-11-14 PROCEDURE — 81003 URINALYSIS AUTO W/O SCOPE: CPT

## 2021-11-14 PROCEDURE — 74011000636 HC RX REV CODE- 636: Performed by: EMERGENCY MEDICINE

## 2021-11-14 PROCEDURE — 96374 THER/PROPH/DIAG INJ IV PUSH: CPT

## 2021-11-14 PROCEDURE — 65610000001 HC ROOM ICU GENERAL

## 2021-11-14 PROCEDURE — 74177 CT ABD & PELVIS W/CONTRAST: CPT

## 2021-11-14 PROCEDURE — 83735 ASSAY OF MAGNESIUM: CPT

## 2021-11-14 PROCEDURE — 70486 CT MAXILLOFACIAL W/O DYE: CPT

## 2021-11-14 PROCEDURE — 74011250636 HC RX REV CODE- 250/636

## 2021-11-14 RX ORDER — ONDANSETRON 2 MG/ML
INJECTION INTRAMUSCULAR; INTRAVENOUS
Status: COMPLETED
Start: 2021-11-14 | End: 2021-11-14

## 2021-11-14 RX ORDER — ONDANSETRON 2 MG/ML
4 INJECTION INTRAMUSCULAR; INTRAVENOUS
Status: DISCONTINUED | OUTPATIENT
Start: 2021-11-14 | End: 2021-11-19 | Stop reason: HOSPADM

## 2021-11-14 RX ORDER — SODIUM CHLORIDE 0.9 % (FLUSH) 0.9 %
10 SYRINGE (ML) INJECTION
Status: COMPLETED | OUTPATIENT
Start: 2021-11-14 | End: 2021-11-14

## 2021-11-14 RX ORDER — MORPHINE SULFATE 10 MG/ML
6 INJECTION, SOLUTION INTRAMUSCULAR; INTRAVENOUS
Status: COMPLETED | OUTPATIENT
Start: 2021-11-14 | End: 2021-11-14

## 2021-11-14 RX ORDER — SODIUM CHLORIDE 0.9 % (FLUSH) 0.9 %
5-10 SYRINGE (ML) INJECTION AS NEEDED
Status: DISCONTINUED | OUTPATIENT
Start: 2021-11-14 | End: 2021-11-19 | Stop reason: HOSPADM

## 2021-11-14 RX ORDER — MORPHINE SULFATE 2 MG/ML
1 INJECTION, SOLUTION INTRAMUSCULAR; INTRAVENOUS
Status: DISCONTINUED | OUTPATIENT
Start: 2021-11-14 | End: 2021-11-19

## 2021-11-14 RX ORDER — SODIUM CHLORIDE 0.9 % (FLUSH) 0.9 %
5-10 SYRINGE (ML) INJECTION EVERY 8 HOURS
Status: DISCONTINUED | OUTPATIENT
Start: 2021-11-14 | End: 2021-11-19 | Stop reason: HOSPADM

## 2021-11-14 RX ORDER — ONDANSETRON 2 MG/ML
8 INJECTION INTRAMUSCULAR; INTRAVENOUS
Status: COMPLETED | OUTPATIENT
Start: 2021-11-14 | End: 2021-11-14

## 2021-11-14 RX ORDER — MAG HYDROX/ALUMINUM HYD/SIMETH 200-200-20
30 SUSPENSION, ORAL (FINAL DOSE FORM) ORAL
Status: COMPLETED | OUTPATIENT
Start: 2021-11-14 | End: 2021-11-14

## 2021-11-14 RX ORDER — LIDOCAINE HYDROCHLORIDE 20 MG/ML
15 SOLUTION OROPHARYNGEAL
Status: COMPLETED | OUTPATIENT
Start: 2021-11-14 | End: 2021-11-14

## 2021-11-14 RX ADMIN — IOPAMIDOL 100 ML: 755 INJECTION, SOLUTION INTRAVENOUS at 19:21

## 2021-11-14 RX ADMIN — MORPHINE SULFATE 1 MG: 2 INJECTION, SOLUTION INTRAMUSCULAR; INTRAVENOUS at 22:17

## 2021-11-14 RX ADMIN — MORPHINE SULFATE 6 MG: 10 INJECTION INTRAVENOUS at 17:54

## 2021-11-14 RX ADMIN — SODIUM CHLORIDE 1000 ML: 900 INJECTION, SOLUTION INTRAVENOUS at 21:27

## 2021-11-14 RX ADMIN — LIDOCAINE HYDROCHLORIDE 15 ML: 20 SOLUTION ORAL; TOPICAL at 20:18

## 2021-11-14 RX ADMIN — SODIUM CHLORIDE 1000 ML: 900 INJECTION, SOLUTION INTRAVENOUS at 18:38

## 2021-11-14 RX ADMIN — ONDANSETRON 4 MG: 2 INJECTION INTRAMUSCULAR; INTRAVENOUS at 23:12

## 2021-11-14 RX ADMIN — Medication 10 ML: at 22:18

## 2021-11-14 RX ADMIN — FAMOTIDINE 20 MG: 10 INJECTION INTRAVENOUS at 20:16

## 2021-11-14 RX ADMIN — ONDANSETRON 8 MG: 2 INJECTION INTRAMUSCULAR; INTRAVENOUS at 17:54

## 2021-11-14 RX ADMIN — ALUMINUM HYDROXIDE, MAGNESIUM HYDROXIDE, AND SIMETHICONE 30 ML: 200; 200; 20 SUSPENSION ORAL at 20:18

## 2021-11-14 RX ADMIN — Medication 10 ML: at 19:22

## 2021-11-14 RX ADMIN — SODIUM CHLORIDE 100 ML: 900 INJECTION, SOLUTION INTRAVENOUS at 19:22

## 2021-11-14 NOTE — ED TRIAGE NOTES
Patient presents with complaints of abdominal pain. Patient with recent EGD and stent removal. Patient son states that the pain has been worsening over the past week. Patient with fall 8 days ago in which she struck her face including the loss of 2 teeth. Patient denies blood thinner. Patient's son concerned regarding abdominal pain and fall.

## 2021-11-15 ENCOUNTER — APPOINTMENT (OUTPATIENT)
Dept: CT IMAGING | Age: 76
DRG: 085 | End: 2021-11-15
Attending: INTERNAL MEDICINE
Payer: MEDICARE

## 2021-11-15 ENCOUNTER — ANESTHESIA EVENT (OUTPATIENT)
Dept: ENDOSCOPY | Age: 76
DRG: 085 | End: 2021-11-15
Payer: MEDICARE

## 2021-11-15 PROBLEM — S02.401A MAXILLARY SINUS FRACTURE (HCC): Status: ACTIVE | Noted: 2021-11-15

## 2021-11-15 PROBLEM — K52.9 CHRONIC DIARRHEA: Status: ACTIVE | Noted: 2021-11-15

## 2021-11-15 PROBLEM — S02.30XA ORBITAL FLOOR FRACTURE (HCC): Status: ACTIVE | Noted: 2021-11-15

## 2021-11-15 LAB
ALBUMIN SERPL-MCNC: 2.8 G/DL (ref 3.2–4.6)
ALBUMIN/GLOB SERPL: 0.8 {RATIO} (ref 1.2–3.5)
ALP SERPL-CCNC: 91 U/L (ref 50–136)
ALT SERPL-CCNC: 14 U/L (ref 12–65)
ANION GAP SERPL CALC-SCNC: 11 MMOL/L (ref 7–16)
AST SERPL-CCNC: 25 U/L (ref 15–37)
BASOPHILS # BLD: 0.1 K/UL (ref 0–0.2)
BASOPHILS NFR BLD: 1 % (ref 0–2)
BILIRUB SERPL-MCNC: 0.5 MG/DL (ref 0.2–1.1)
BUN SERPL-MCNC: 5 MG/DL (ref 8–23)
CALCIUM SERPL-MCNC: 7.3 MG/DL (ref 8.3–10.4)
CHLORIDE SERPL-SCNC: 106 MMOL/L (ref 98–107)
CO2 SERPL-SCNC: 21 MMOL/L (ref 21–32)
COVID-19 RAPID TEST, COVR: NOT DETECTED
CREAT SERPL-MCNC: 0.66 MG/DL (ref 0.6–1)
DIFFERENTIAL METHOD BLD: ABNORMAL
EOSINOPHIL # BLD: 0 K/UL (ref 0–0.8)
EOSINOPHIL NFR BLD: 1 % (ref 0.5–7.8)
ERYTHROCYTE [DISTWIDTH] IN BLOOD BY AUTOMATED COUNT: 14.8 % (ref 11.9–14.6)
GLOBULIN SER CALC-MCNC: 3.7 G/DL (ref 2.3–3.5)
GLUCOSE BLD STRIP.AUTO-MCNC: 158 MG/DL (ref 65–100)
GLUCOSE BLD STRIP.AUTO-MCNC: 57 MG/DL (ref 65–100)
GLUCOSE SERPL-MCNC: 52 MG/DL (ref 65–100)
HCT VFR BLD AUTO: 36.8 % (ref 35.8–46.3)
HGB BLD-MCNC: 11.8 G/DL (ref 11.7–15.4)
IMM GRANULOCYTES # BLD AUTO: 0 K/UL (ref 0–0.5)
IMM GRANULOCYTES NFR BLD AUTO: 1 % (ref 0–5)
LYMPHOCYTES # BLD: 1.2 K/UL (ref 0.5–4.6)
LYMPHOCYTES NFR BLD: 18 % (ref 13–44)
MCH RBC QN AUTO: 30.4 PG (ref 26.1–32.9)
MCHC RBC AUTO-ENTMCNC: 32.1 G/DL (ref 31.4–35)
MCV RBC AUTO: 94.8 FL (ref 79.6–97.8)
MONOCYTES # BLD: 0.5 K/UL (ref 0.1–1.3)
MONOCYTES NFR BLD: 7 % (ref 4–12)
NEUTS SEG # BLD: 4.8 K/UL (ref 1.7–8.2)
NEUTS SEG NFR BLD: 73 % (ref 43–78)
NRBC # BLD: 0 K/UL (ref 0–0.2)
PLATELET # BLD AUTO: 221 K/UL (ref 150–450)
PMV BLD AUTO: 9.4 FL (ref 9.4–12.3)
POTASSIUM SERPL-SCNC: 4.5 MMOL/L (ref 3.5–5.1)
PROT SERPL-MCNC: 6.5 G/DL (ref 6.3–8.2)
RBC # BLD AUTO: 3.88 M/UL (ref 4.05–5.2)
SARS-COV-2, COV2: NORMAL
SERVICE CMNT-IMP: ABNORMAL
SERVICE CMNT-IMP: ABNORMAL
SODIUM SERPL-SCNC: 138 MMOL/L (ref 136–145)
SOURCE, COVRS: NORMAL
T4 FREE SERPL-MCNC: 0.5 NG/DL (ref 0.78–1.46)
TSH SERPL DL<=0.005 MIU/L-ACNC: 31 UIU/ML (ref 0.36–3.74)
WBC # BLD AUTO: 6.5 K/UL (ref 4.3–11.1)

## 2021-11-15 PROCEDURE — 97161 PT EVAL LOW COMPLEX 20 MIN: CPT

## 2021-11-15 PROCEDURE — 80053 COMPREHEN METABOLIC PANEL: CPT

## 2021-11-15 PROCEDURE — 97165 OT EVAL LOW COMPLEX 30 MIN: CPT

## 2021-11-15 PROCEDURE — 74011250636 HC RX REV CODE- 250/636: Performed by: STUDENT IN AN ORGANIZED HEALTH CARE EDUCATION/TRAINING PROGRAM

## 2021-11-15 PROCEDURE — 74011000250 HC RX REV CODE- 250: Performed by: EMERGENCY MEDICINE

## 2021-11-15 PROCEDURE — 77030040361 HC SLV COMPR DVT MDII -B

## 2021-11-15 PROCEDURE — 74011000250 HC RX REV CODE- 250: Performed by: STUDENT IN AN ORGANIZED HEALTH CARE EDUCATION/TRAINING PROGRAM

## 2021-11-15 PROCEDURE — 87635 SARS-COV-2 COVID-19 AMP PRB: CPT

## 2021-11-15 PROCEDURE — 84439 ASSAY OF FREE THYROXINE: CPT

## 2021-11-15 PROCEDURE — 74011250636 HC RX REV CODE- 250/636: Performed by: INTERNAL MEDICINE

## 2021-11-15 PROCEDURE — 65610000001 HC ROOM ICU GENERAL

## 2021-11-15 PROCEDURE — 86580 TB INTRADERMAL TEST: CPT | Performed by: INTERNAL MEDICINE

## 2021-11-15 PROCEDURE — 36415 COLL VENOUS BLD VENIPUNCTURE: CPT

## 2021-11-15 PROCEDURE — 74011000250 HC RX REV CODE- 250: Performed by: INTERNAL MEDICINE

## 2021-11-15 PROCEDURE — 74011250636 HC RX REV CODE- 250/636: Performed by: FAMILY MEDICINE

## 2021-11-15 PROCEDURE — 70450 CT HEAD/BRAIN W/O DYE: CPT

## 2021-11-15 PROCEDURE — 51798 US URINE CAPACITY MEASURE: CPT

## 2021-11-15 PROCEDURE — C9113 INJ PANTOPRAZOLE SODIUM, VIA: HCPCS | Performed by: STUDENT IN AN ORGANIZED HEALTH CARE EDUCATION/TRAINING PROGRAM

## 2021-11-15 PROCEDURE — 82962 GLUCOSE BLOOD TEST: CPT

## 2021-11-15 PROCEDURE — 84443 ASSAY THYROID STIM HORMONE: CPT

## 2021-11-15 PROCEDURE — 77010033678 HC OXYGEN DAILY

## 2021-11-15 PROCEDURE — 2709999900 HC NON-CHARGEABLE SUPPLY

## 2021-11-15 PROCEDURE — 77030019905 HC CATH URETH INTMIT MDII -A

## 2021-11-15 PROCEDURE — 97535 SELF CARE MNGMENT TRAINING: CPT

## 2021-11-15 PROCEDURE — 97112 NEUROMUSCULAR REEDUCATION: CPT

## 2021-11-15 PROCEDURE — 74011250637 HC RX REV CODE- 250/637: Performed by: NURSE PRACTITIONER

## 2021-11-15 PROCEDURE — 77030034850

## 2021-11-15 PROCEDURE — 74011250637 HC RX REV CODE- 250/637: Performed by: INTERNAL MEDICINE

## 2021-11-15 PROCEDURE — 85025 COMPLETE CBC W/AUTO DIFF WBC: CPT

## 2021-11-15 PROCEDURE — C9113 INJ PANTOPRAZOLE SODIUM, VIA: HCPCS | Performed by: INTERNAL MEDICINE

## 2021-11-15 PROCEDURE — 97530 THERAPEUTIC ACTIVITIES: CPT

## 2021-11-15 PROCEDURE — 99291 CRITICAL CARE FIRST HOUR: CPT | Performed by: NEUROLOGICAL SURGERY

## 2021-11-15 PROCEDURE — 77030038269 HC DRN EXT URIN PURWCK BARD -A

## 2021-11-15 RX ORDER — SODIUM CHLORIDE 9 MG/ML
75 INJECTION, SOLUTION INTRAVENOUS CONTINUOUS
Status: DISCONTINUED | OUTPATIENT
Start: 2021-11-15 | End: 2021-11-15

## 2021-11-15 RX ORDER — HYDRALAZINE HYDROCHLORIDE 20 MG/ML
20 INJECTION INTRAMUSCULAR; INTRAVENOUS
Status: DISCONTINUED | OUTPATIENT
Start: 2021-11-15 | End: 2021-11-19 | Stop reason: HOSPADM

## 2021-11-15 RX ORDER — DEXTROSE MONOHYDRATE AND SODIUM CHLORIDE 5; .45 G/100ML; G/100ML
75 INJECTION, SOLUTION INTRAVENOUS CONTINUOUS
Status: DISCONTINUED | OUTPATIENT
Start: 2021-11-15 | End: 2021-11-18

## 2021-11-15 RX ORDER — SODIUM CHLORIDE, SODIUM LACTATE, POTASSIUM CHLORIDE, CALCIUM CHLORIDE 600; 310; 30; 20 MG/100ML; MG/100ML; MG/100ML; MG/100ML
100 INJECTION, SOLUTION INTRAVENOUS CONTINUOUS
Status: CANCELLED | OUTPATIENT
Start: 2021-11-15

## 2021-11-15 RX ORDER — LANOLIN ALCOHOL/MO/W.PET/CERES
400 CREAM (GRAM) TOPICAL 2 TIMES DAILY
Status: DISCONTINUED | OUTPATIENT
Start: 2021-11-15 | End: 2021-11-19 | Stop reason: HOSPADM

## 2021-11-15 RX ORDER — CHOLESTYRAMINE 4 G/4.8G
4 POWDER, FOR SUSPENSION ORAL 2 TIMES DAILY WITH MEALS
Status: DISCONTINUED | OUTPATIENT
Start: 2021-11-15 | End: 2021-11-19 | Stop reason: HOSPADM

## 2021-11-15 RX ORDER — SODIUM CHLORIDE 0.9 % (FLUSH) 0.9 %
5-40 SYRINGE (ML) INJECTION EVERY 8 HOURS
Status: DISCONTINUED | OUTPATIENT
Start: 2021-11-15 | End: 2021-11-19 | Stop reason: HOSPADM

## 2021-11-15 RX ORDER — POLYETHYLENE GLYCOL 3350 17 G/17G
17 POWDER, FOR SOLUTION ORAL DAILY PRN
Status: DISCONTINUED | OUTPATIENT
Start: 2021-11-15 | End: 2021-11-19 | Stop reason: HOSPADM

## 2021-11-15 RX ORDER — SODIUM CHLORIDE 0.9 % (FLUSH) 0.9 %
5-40 SYRINGE (ML) INJECTION AS NEEDED
Status: DISCONTINUED | OUTPATIENT
Start: 2021-11-15 | End: 2021-11-19 | Stop reason: HOSPADM

## 2021-11-15 RX ORDER — AMLODIPINE BESYLATE 5 MG/1
5 TABLET ORAL EVERY EVENING
Status: DISCONTINUED | OUTPATIENT
Start: 2021-11-15 | End: 2021-11-19 | Stop reason: HOSPADM

## 2021-11-15 RX ORDER — LEVOTHYROXINE AND LIOTHYRONINE 38; 9 UG/1; UG/1
180 TABLET ORAL DAILY
Status: DISCONTINUED | OUTPATIENT
Start: 2021-11-15 | End: 2021-11-19 | Stop reason: HOSPADM

## 2021-11-15 RX ORDER — DEXTROSE 50 % IN WATER (D50W) INTRAVENOUS SYRINGE
25-50 AS NEEDED
Status: DISCONTINUED | OUTPATIENT
Start: 2021-11-15 | End: 2021-11-19 | Stop reason: HOSPADM

## 2021-11-15 RX ORDER — ACETAMINOPHEN 325 MG/1
650 TABLET ORAL
Status: DISCONTINUED | OUTPATIENT
Start: 2021-11-15 | End: 2021-11-19 | Stop reason: HOSPADM

## 2021-11-15 RX ORDER — SUCRALFATE 1 G/1
1 TABLET ORAL
Status: DISCONTINUED | OUTPATIENT
Start: 2021-11-15 | End: 2021-11-19 | Stop reason: HOSPADM

## 2021-11-15 RX ORDER — LANOLIN ALCOHOL/MO/W.PET/CERES
500 CREAM (GRAM) TOPICAL
Status: DISCONTINUED | OUTPATIENT
Start: 2021-11-15 | End: 2021-11-19 | Stop reason: HOSPADM

## 2021-11-15 RX ORDER — LOSARTAN POTASSIUM 50 MG/1
100 TABLET ORAL DAILY
Status: DISCONTINUED | OUTPATIENT
Start: 2021-11-15 | End: 2021-11-19 | Stop reason: HOSPADM

## 2021-11-15 RX ORDER — LEVETIRACETAM 500 MG/1
500 TABLET ORAL 2 TIMES DAILY
Status: DISCONTINUED | OUTPATIENT
Start: 2021-11-15 | End: 2021-11-19 | Stop reason: HOSPADM

## 2021-11-15 RX ORDER — ACETAMINOPHEN 650 MG/1
650 SUPPOSITORY RECTAL
Status: DISCONTINUED | OUTPATIENT
Start: 2021-11-15 | End: 2021-11-19 | Stop reason: HOSPADM

## 2021-11-15 RX ORDER — HYDROCHLOROTHIAZIDE 25 MG/1
25 TABLET ORAL DAILY
Status: DISCONTINUED | OUTPATIENT
Start: 2021-11-15 | End: 2021-11-19 | Stop reason: HOSPADM

## 2021-11-15 RX ORDER — DEXTROSE 40 %
15 GEL (GRAM) ORAL AS NEEDED
Status: DISCONTINUED | OUTPATIENT
Start: 2021-11-15 | End: 2021-11-19 | Stop reason: HOSPADM

## 2021-11-15 RX ORDER — MELATONIN
2000 DAILY
Status: DISCONTINUED | OUTPATIENT
Start: 2021-11-15 | End: 2021-11-19 | Stop reason: HOSPADM

## 2021-11-15 RX ORDER — CARVEDILOL 12.5 MG/1
25 TABLET ORAL 2 TIMES DAILY WITH MEALS
Status: DISCONTINUED | OUTPATIENT
Start: 2021-11-15 | End: 2021-11-19 | Stop reason: HOSPADM

## 2021-11-15 RX ADMIN — Medication 10 ML: at 22:00

## 2021-11-15 RX ADMIN — TUBERCULIN PURIFIED PROTEIN DERIVATIVE 5 UNITS: 5 INJECTION, SOLUTION INTRADERMAL at 05:32

## 2021-11-15 RX ADMIN — LEVOTHYROXINE, LIOTHYRONINE 180 MG: 38; 9 TABLET ORAL at 11:18

## 2021-11-15 RX ADMIN — VITAMIN D, TAB 1000IU (100/BT) 2000 UNITS: 25 TAB at 08:34

## 2021-11-15 RX ADMIN — CARVEDILOL 25 MG: 12.5 TABLET, FILM COATED ORAL at 16:29

## 2021-11-15 RX ADMIN — CYANOCOBALAMIN TAB 1000 MCG 500 MCG: 1000 TAB at 06:01

## 2021-11-15 RX ADMIN — MORPHINE SULFATE 1 MG: 2 INJECTION, SOLUTION INTRAMUSCULAR; INTRAVENOUS at 02:09

## 2021-11-15 RX ADMIN — DEXTROSE MONOHYDRATE 12.5 G: 25 INJECTION, SOLUTION INTRAVENOUS at 05:31

## 2021-11-15 RX ADMIN — Medication 400 MG: at 08:34

## 2021-11-15 RX ADMIN — SODIUM CHLORIDE 40 MG: 9 INJECTION, SOLUTION INTRAMUSCULAR; INTRAVENOUS; SUBCUTANEOUS at 05:31

## 2021-11-15 RX ADMIN — ACETAMINOPHEN 650 MG: 325 TABLET ORAL at 16:28

## 2021-11-15 RX ADMIN — SUCRALFATE 1 G: 1 TABLET ORAL at 22:00

## 2021-11-15 RX ADMIN — HYDROCHLOROTHIAZIDE 25 MG: 25 TABLET ORAL at 08:34

## 2021-11-15 RX ADMIN — SUCRALFATE 1 G: 1 TABLET ORAL at 16:29

## 2021-11-15 RX ADMIN — MORPHINE SULFATE 1 MG: 2 INJECTION, SOLUTION INTRAMUSCULAR; INTRAVENOUS at 07:42

## 2021-11-15 RX ADMIN — Medication 400 MG: at 17:54

## 2021-11-15 RX ADMIN — Medication 10 ML: at 06:01

## 2021-11-15 RX ADMIN — DEXTROSE MONOHYDRATE AND SODIUM CHLORIDE 75 ML/HR: 5; .45 INJECTION, SOLUTION INTRAVENOUS at 20:21

## 2021-11-15 RX ADMIN — Medication 10 ML: at 14:16

## 2021-11-15 RX ADMIN — SUCRALFATE 1 G: 1 TABLET ORAL at 11:15

## 2021-11-15 RX ADMIN — CHOLESTYRAMINE 4 G: 4 POWDER, FOR SUSPENSION ORAL at 16:29

## 2021-11-15 RX ADMIN — SODIUM CHLORIDE 40 MG: 9 INJECTION INTRAMUSCULAR; INTRAVENOUS; SUBCUTANEOUS at 20:20

## 2021-11-15 RX ADMIN — LEVETIRACETAM 500 MG: 500 TABLET, FILM COATED ORAL at 11:15

## 2021-11-15 RX ADMIN — LEVETIRACETAM 500 MG: 500 TABLET, FILM COATED ORAL at 17:54

## 2021-11-15 RX ADMIN — ONDANSETRON 4 MG: 2 INJECTION INTRAMUSCULAR; INTRAVENOUS at 05:01

## 2021-11-15 RX ADMIN — CHOLESTYRAMINE 4 G: 4 POWDER, FOR SUSPENSION ORAL at 07:42

## 2021-11-15 RX ADMIN — SODIUM CHLORIDE 75 ML/HR: 900 INJECTION, SOLUTION INTRAVENOUS at 01:18

## 2021-11-15 RX ADMIN — MORPHINE SULFATE 1 MG: 2 INJECTION, SOLUTION INTRAMUSCULAR; INTRAVENOUS at 20:02

## 2021-11-15 RX ADMIN — Medication 10 ML: at 01:18

## 2021-11-15 RX ADMIN — LOSARTAN POTASSIUM 100 MG: 50 TABLET, FILM COATED ORAL at 08:34

## 2021-11-15 RX ADMIN — SUCRALFATE 1 G: 1 TABLET ORAL at 07:41

## 2021-11-15 RX ADMIN — MORPHINE SULFATE 1 MG: 2 INJECTION, SOLUTION INTRAMUSCULAR; INTRAVENOUS at 14:15

## 2021-11-15 RX ADMIN — CARVEDILOL 25 MG: 12.5 TABLET, FILM COATED ORAL at 07:41

## 2021-11-15 RX ADMIN — DEXTROSE MONOHYDRATE AND SODIUM CHLORIDE 75 ML/HR: 5; .45 INJECTION, SOLUTION INTRAVENOUS at 06:01

## 2021-11-15 NOTE — PROGRESS NOTES
Hospitalist Progress Note   Admit Date:  2021  5:28 PM   Name:  Kimberly Nichols   Age:  78 y.o. Sex:  female  :  1945   MRN:  [id number]   Room:  3105/    Presenting Complaint: Abdominal Pain    Reason(s) for Admission: Subdural hematoma [facility]) [facility] Course & Interval History:     Ms. Flash Mayfield is a 77 yo female with PMH of chronic pancreatitis, conversion disorder, nephrolithiasis, GERD, PUD, TIA, IBS admitted with recurrent nausea/ abd pain/ diarrhea with recent fall. GI consulted to evaluate her GI complaints. She has pending cdiff and typically takes Isma. She had some hypoglycemia and diet ordered as tolerant. CTAP no acute issues. During her fall, she sit her face and knocked out 2 left sided front teeth. Has not seen dental to date. CT head shows acute on chronic right SDH with mild right to left midline shift of 6 mm as well as left maxillary sinus fractures and a nondisplaced left orbital floor fracture. Neurosurgery has consulted and no acute surgical needs noted. ENT consult pending. Discharge plans pending. Subjective (11/15/21):       Has ongoing nausea, abd pain, dry heaves, diarrhea, knocked out teeth with fall and has facial trauma      Assessment & Plan:       Subdural hematoma ([address] (2021)  Left Orbital floor fracture   Left maxillary sinus fractures   · followup CT head stable  · neurosurgery has evaluated and no acute needs  · CT head ordered for 21  · ENT consult pending   · 24 hours ICU monitoring then transfer to floor  · neurochecks   · BP goals < 140/90   · PT,OT, PPD       Active Problems:    Hypothyroidism (2013)  ·   Continued armour thyroid         GERD (gastroesophageal reflux disease) (2013)          Essential hypertension (2013)  ·   Continued norvasc, coreg, HCTZ, cozaar        Dyslipidemia (2014)         Peptic ulcer disease (2016)      Abdominal pain (2021)     Chronic diarrhea (11/15/2021)  Chronic pancreatitis  Chronic nausea and emesis   · GI consulted   · Has diet as tolerant   · cdiff stool testing pending   · Continued questran, carafate protonix       Hypoglycemia:  · On dextrose IVF  · Has regular diet ordered         Dispo/Discharge Planning:      Pending     Diet:  ADULT DIET Regular; No Salt Added (3-4 gm)  DVT PPx: SCD  Code status: Full Code    Hospital Problems as of 11/15/2021 Date Reviewed: 7/15/2021          Codes Class Noted - Resolved POA    Chronic diarrhea ICD-10-CM: K52.9  ICD-9-CM: 787.91  11/15/2021 - Present Unknown        Orbital floor fracture ([facility] [address] ICD-10-CM: S02.30XA  ICD-9-CM: 802.6  11/15/2021 - Present Yes        Maxillary sinus fracture ([facility] [address] ICD-10-CM: S02.401A  ICD-9-CM: 802.4  11/15/2021 - Present Yes        Subdural hematoma ([facility] [address] ICD-10-CM: Q79.7P2K  ICD-9-CM: 432.1  11/14/2021 - Present Unknown        Abdominal pain ICD-10-CM: R10.9  ICD-9-CM: 789.00  11/14/2021 - Present Unknown        Peptic ulcer disease ICD-10-CM: K27.9  ICD-9-CM: 533.90  8/11/2016 - Present Unknown    Overview Signed 8/11/2016  1:45 PM by Byron Lemus     1. EGD May 2015 with several ulcers. Dyslipidemia (Chronic) ICD-10-CM: E78.5  ICD-9-CM: 272.4  5/20/2014 - Present Yes        Hypothyroidism ICD-10-CM: E03.9  ICD-9-CM: 244.9  9/30/2013 - Present Yes        GERD (gastroesophageal reflux disease) (Chronic) ICD-10-CM: K21.9  ICD-9-CM: 530.81  9/30/2013 - Present Yes    Overview Signed 11/1/2013  4:43 PM by Neno Grimes NP     S/p Nissen Fundoplication             Essential hypertension ICD-10-CM: I10  ICD-9-CM: 401.9  9/30/2013 - Present Yes    Overview Signed 8/11/2016  1:44 PM by Byron Lemus     1. Echo (5/20/14) : Normal LV systolic function. EF 55-60%. Normal diastolic function. RVSP 40-45. Mild to moderate tricuspid regurgitation. Mild mitral regurgitation. 2.  Renal artery US (7/8/14): No evidence of renal artery stenosis.     3. Plasma Metanephrine. (6/25/14): Normal  4. Echo (11/14):  EF 88-55%  Grade 1 diastolic dysfunction. Mild mitral regurgitaiton.                     Objective:     Patient Vitals for the past 24 hrs:   Temp Pulse Resp BP SpO2   11/15/21 0700  79 20 136/78 97 %   11/15/21 0645  83 21  97 %   11/15/21 0630  83 19 133/78 97 %   11/15/21 0615  86 20  98 %   11/15/21 0600  83 (!) 33 (!) 157/71 99 %   11/15/21 0545  79 18 135/64 100 %   11/15/21 0530  76 24  98 %   11/15/21 0515  72 19  96 %   11/15/21 0500  90 18  97 %   11/15/21 0445  78 25  96 %   11/15/21 0430  74 20 136/66 96 %   11/15/21 0415  77 22 139/66 96 %   11/15/21 0400  82 30 (!) 150/69 97 %   11/15/21 0345  74 21 133/70 95 %   11/15/21 0330  77 18 (!) 143/75 97 %   11/15/21 0315  79 21 (!) 144/78 96 %   11/15/21 0304     95 %   11/15/21 0300 97.9 °F (36.6 °C) 74 16 139/75 95 %   11/15/21 0245  77 21 (!) 141/77 96 %   11/15/21 0230  74 20 139/71 96 %   11/15/21 0215  76 15 (!) 145/75 96 %   11/15/21 0200  73 16 139/72 95 %   11/15/21 0145  73 17 (!) 142/75 95 %   11/15/21 0130  72 17 (!) 142/76 95 %   11/15/21 0115  73 17 (!) 140/76 95 %   11/15/21 0100  72 18 136/75 95 %   11/15/21 0045  72 17 133/72 95 %   11/15/21 0030  74 15 138/78 95 %   11/15/21 0015  75 (!) 32 (!) 141/79 92 %   11/15/21 0000  75 (!) 44 (!) 143/79 96 %   11/14/21 2345  75 17 (!) 144/79 96 %   11/14/21 2331     96 %   11/14/21 2330  75 19 139/73 96 %   11/14/21 2315  77 20 (!) 141/78 98 %   11/14/21 2300  75 17 (!) 148/80 96 %   11/14/21 2245  73 18 (!) 142/76 95 %   11/14/21 2230  75 22 (!) 148/78 (!) 82 %   11/14/21 2215  73 (!) 33 (!) 140/72 92 %   11/14/21 2200  74 (!) 52 132/70 96 %   11/14/21 2150 98.4 °F (36.9 °C) 77 29 (!) 152/71 96 %   11/14/21 2117  78   95 %   11/14/21 2108  78  137/72 90 %   11/14/21 2016  79  (!) 145/75 98 %   11/14/21 2006  81  138/65 95 %   11/14/21 1957    (!) 167/95    11/14/21 1841     95 %   11/14/21 1823  82  (!) 167/95 (!) 84 %   11/14/21 1804  82  (!) 156/96 96 %   11/14/21 1749    (!) 143/117    11/14/21 1735  94  (!) 143/117 98 %   11/14/21 1733 98.3 °F (36.8 °C) 85 18  95 %     Oxygen Therapy  O2 Sat (%): 97 % (11/15/21 0700)  Pulse via Oximetry: 79 beats per minute (11/15/21 0700)  O2 Device: Nasal cannula (11/15/21 0304)  Skin Assessment: Clean, dry, & intact (11/15/21 0304)  O2 Flow Rate (L/min): 1 l/min (11/15/21 0304)    Estimated body mass index is 22.86 kg/m² as calculated from the following:    Height as of this encounter: 5' 2.5\" (1.588 m). Weight as of this encounter: 57.6 kg (126 lb 15.8 oz). Intake/Output Summary (Last 24 hours) at 11/15/2021 0831  Last data filed at 11/15/2021 0604  Gross per 24 hour   Intake 857.5 ml   Output 725 ml   Net 132.5 ml         Physical Exam:     Blood pressure 136/78, pulse 79, temperature 97.9 °F (36.6 °C), resp. rate 20, height 5' 2.5\" (1.588 m), weight 57.6 kg (126 lb 15.8 oz), SpO2 97 %, not currently breastfeeding. General:    Well nourished. No overt distress, elderly,   Head:  Facial bruising, missing 2 front teeth  CV:   RRR. No m/r/g. No jugular venous distension. No edema   Lungs:   CTAB. No wheezing, rhonchi, or rales. Respirations even, unlabored anterior   Abdomen: Bowel sounds present. Soft, nontender, nondistended. Extremities: No cyanosis or clubbing. No edema  Skin:     No rashes and normal coloration. Warm and dry. Neuro:   grossly intact. Psych:  Normal mood and affect.       I have reviewed ordered lab tests and independently visualized imaging below:    Recent Labs:  Recent Results (from the past 48 hour(s))   CBC WITH AUTOMATED DIFF    Collection Time: 11/14/21  5:49 PM   Result Value Ref Range    WBC 6.2 4.3 - 11.1 K/uL    RBC 4.25 4.05 - 5.2 M/uL    HGB 13.5 11.7 - 15.4 g/dL    HCT 39.5 35.8 - 46.3 %    MCV 92.9 79.6 - 97.8 FL    MCH 31.8 26.1 - 32.9 PG    MCHC 34.2 31.4 - 35.0 g/dL    RDW 14.6 11.9 - 14.6 %    PLATELET 517 219 - 726 K/uL    MPV 9.2 (L) 9.4 - 12.3 FL    ABSOLUTE NRBC 0.00 0.0 - 0.2 K/uL    DF AUTOMATED      NEUTROPHILS 50 43 - 78 %    LYMPHOCYTES 39 13 - 44 %    MONOCYTES 9 4.0 - 12.0 %    EOSINOPHILS 1 0.5 - 7.8 %    BASOPHILS 1 0.0 - 2.0 %    IMMATURE GRANULOCYTES 0 0.0 - 5.0 %    ABS. NEUTROPHILS 3.1 1.7 - 8.2 K/UL    ABS. LYMPHOCYTES 2.4 0.5 - 4.6 K/UL    ABS. MONOCYTES 0.6 0.1 - 1.3 K/UL    ABS. EOSINOPHILS 0.1 0.0 - 0.8 K/UL    ABS. BASOPHILS 0.1 0.0 - 0.2 K/UL    ABS. IMM. GRANS. 0.0 0.0 - 0.5 K/UL   METABOLIC PANEL, COMPREHENSIVE    Collection Time: 11/14/21  5:49 PM   Result Value Ref Range    Sodium 132 (L) 136 - 145 mmol/L    Potassium 4.4 3.5 - 5.1 mmol/L    Chloride 99 98 - 107 mmol/L    CO2 22 21 - 32 mmol/L    Anion gap 11 7 - 16 mmol/L    Glucose 70 65 - 100 mg/dL    BUN 6 (L) 8 - 23 MG/DL    Creatinine 0.79 0.6 - 1.0 MG/DL    GFR est AA >60 >60 ml/min/1.73m2    GFR est non-AA >60 >60 ml/min/1.73m2    Calcium 8.5 8.3 - 10.4 MG/DL    Bilirubin, total 0.5 0.2 - 1.1 MG/DL    ALT (SGPT) 21 12 - 65 U/L    AST (SGOT) 27 15 - 37 U/L    Alk.  phosphatase 109 50 - 136 U/L    Protein, total 7.5 6.3 - 8.2 g/dL    Albumin 3.3 3.2 - 4.6 g/dL    Globulin 4.2 (H) 2.3 - 3.5 g/dL    A-G Ratio 0.8 (L) 1.2 - 3.5     LIPASE    Collection Time: 11/14/21  5:49 PM   Result Value Ref Range    Lipase 80 73 - 393 U/L   LACTIC ACID    Collection Time: 11/14/21  5:49 PM   Result Value Ref Range    Lactic acid 3.2 (H) 0.4 - 2.0 MMOL/L   MAGNESIUM    Collection Time: 11/14/21  5:49 PM   Result Value Ref Range    Magnesium 1.9 1.8 - 2.4 mg/dL   METABOLIC PANEL, COMPREHENSIVE    Collection Time: 11/15/21  3:21 AM   Result Value Ref Range    Sodium 138 136 - 145 mmol/L    Potassium 4.5 3.5 - 5.1 mmol/L    Chloride 106 98 - 107 mmol/L    CO2 21 21 - 32 mmol/L    Anion gap 11 7 - 16 mmol/L    Glucose 52 (L) 65 - 100 mg/dL    BUN 5 (L) 8 - 23 MG/DL    Creatinine 0.66 0.6 - 1.0 MG/DL    GFR est AA >60 >60 ml/min/1.73m2    GFR est non-AA >60 >60 ml/min/1.73m2    Calcium 7.3 (L) 8.3 - 10.4 MG/DL    Bilirubin, total 0.5 0.2 - 1.1 MG/DL    ALT (SGPT) 14 12 - 65 U/L    AST (SGOT) 25 15 - 37 U/L    Alk. phosphatase 91 50 - 136 U/L    Protein, total 6.5 6.3 - 8.2 g/dL    Albumin 2.8 (L) 3.2 - 4.6 g/dL    Globulin 3.7 (H) 2.3 - 3.5 g/dL    A-G Ratio 0.8 (L) 1.2 - 3.5     CBC WITH AUTOMATED DIFF    Collection Time: 11/15/21  3:21 AM   Result Value Ref Range    WBC 6.5 4.3 - 11.1 K/uL    RBC 3.88 (L) 4.05 - 5.2 M/uL    HGB 11.8 11.7 - 15.4 g/dL    HCT 36.8 35.8 - 46.3 %    MCV 94.8 79.6 - 97.8 FL    MCH 30.4 26.1 - 32.9 PG    MCHC 32.1 31.4 - 35.0 g/dL    RDW 14.8 (H) 11.9 - 14.6 %    PLATELET 647 324 - 199 K/uL    MPV 9.4 9.4 - 12.3 FL    ABSOLUTE NRBC 0.00 0.0 - 0.2 K/uL    DF AUTOMATED      NEUTROPHILS 73 43 - 78 %    LYMPHOCYTES 18 13 - 44 %    MONOCYTES 7 4.0 - 12.0 %    EOSINOPHILS 1 0.5 - 7.8 %    BASOPHILS 1 0.0 - 2.0 %    IMMATURE GRANULOCYTES 1 0.0 - 5.0 %    ABS. NEUTROPHILS 4.8 1.7 - 8.2 K/UL    ABS. LYMPHOCYTES 1.2 0.5 - 4.6 K/UL    ABS. MONOCYTES 0.5 0.1 - 1.3 K/UL    ABS. EOSINOPHILS 0.0 0.0 - 0.8 K/UL    ABS. BASOPHILS 0.1 0.0 - 0.2 K/UL    ABS. IMM.  GRANS. 0.0 0.0 - 0.5 K/UL   T4, FREE    Collection Time: 11/15/21  3:21 AM   Result Value Ref Range    T4, Free 0.5 (L) 0.78 - 1.46 NG/DL   TSH 3RD GENERATION    Collection Time: 11/15/21  3:21 AM   Result Value Ref Range    TSH 31.000 (H) 0.358 - 3.740 uIU/mL   GLUCOSE, POC    Collection Time: 11/15/21  5:18 AM   Result Value Ref Range    Glucose (POC) 57 (L) 65 - 100 mg/dL    Performed by [facility]    GLUCOSE, POC    Collection Time: 11/15/21  5:58 AM   Result Value Ref Range    Glucose (POC) 158 (H) 65 - 100 mg/dL    Performed by [facility]        All Micro Results     Procedure Component Value Units Date/Time    C. DIFFICILE AG & TOXIN A/B [[id number]]     Order Status: Sent Specimen: Stool           Other Studies:  CT HEAD WO CONT    Result Date: 11/15/2021  Noncontrast CT of the brain. COMPARISON: November 14, 2021 INDICATION: Subdural hemorrhage, follow-up TECHNIQUE: Contiguous axial images were obtained from the skull base through the vertex without IV contrast. Radiation dose reduction techniques were used for this study:  Our CT scanners use one or all of the following: Automated exposure control, adjustment of the mA and/or kVp according to patient's size, iterative reconstruction. FINDINGS: There is acute on subacute subdural hemorrhage along the right convexity, measuring up to 14 mm at the maximal thickness. There is mass effect on the brain parenchyma and approximately 5 mm right-to-left midline shift. There is no hydrocephalus. Periventricular hypodensities, nonspecific and likely due to chronic small vessel changes. Cerebellum and brainstem are grossly unremarkable. Included globes appear intact. There is complete opacification of the left maxillary sinus. The mastoid air cells are aerated. Sounding bones are stable. 1. Acute-on-subacute right-sided subdural hemorrhage (approximately 14 mm at the maximal thickness). Overall size of the subdural collection appears similar to prior exam; however the acute component has increased when compared to CT from yesterday. Associated mass effect and 5 mm right-to-left midline shift. CT HEAD WO CONT    Result Date: 11/14/2021  CT HEAD, AND CERVICAL SPINE WITHOUT CONTRAST, 11/14/2021. History: Fall 8 days ago. Comparison: None. Technique:   5 mm axial scans from the skull base to the vertex of the head, axial 2.5 mm scans from above the orbits through the mandible with coronal reconstructed images were also performed of the face, and 1.25 mm axial scans from the skull base into the upper chest performed, and sagittal and coronal reconstructed images performed of the cervical spine.  All CT scans performed at this facility use one or all of the following: Automated exposure control, adjustment of the mA and/or kVp according to patient's size, iterative reconstruction. CT HEAD: Findings: The mixed attenuation subdural collection is seen along the right lateral high convexity which demonstrates hyperdense components. The appearance is most concerning for an acute on chronic subdural hematoma. This measures up to 13 mm in width. Mild mass effect is seen with right to left midline shift measuring 6 mm at this time. .  The ventricles are normal in size and configuration. No evidence of midline shift or obvious mass effect is seen. No abnormal edema pattern is seen in a vascular distribution to suggest large artery infarction. Evaluation with bone windows shows no acute osseous abnormality of the bony calvarium. No abnormal fluid collections are seen associated with the aerated sinuses. CT FACIAL BONES :Multiple fractures are suggested of the left maxillary sinus. This is most clearly demonstrated in the posterior wall of the maxillary sinus although does appear to extend through the left orbital floor seen on axial image 48. No abnormal depression of the orbital floor is currently seen. Some component of fracture line appears to extend to the most anterior aspect of the left zygoma seen on axial image 46. Inferior fracture line extends to the root of the left second maxillary premolar. Fluid is seen within the left axillary sinus which may represent secondary fluid. No acute facial bone fractures otherwise seen. CT CERVICAL SPINE: The skull base is grossly intact. No acute fracture line of the cervical spine is directly visualized. Only chronic degenerative changes are seen with moderate to advanced degenerative changes throughout the cervical spine involving both disc levels and posterior facets. Mild alignment abnormality is are seen without acute findings of posterior elements likely related to facet hypertrophic degenerative changes. .  The dens is intact, and the pre dens space is normal. Prevertebral soft tissues are normal. Limited evaluation of the lung apices shows no gross abnormalities. 1.  Acute on chronic right subdural hematoma with mild right to left midline shift measuring 6 mm at the septum pellucidum. 2. Multiple fractures the left maxillary sinus to include a nondisplaced fracture of the left orbital floor as described above. 3. No acute osseous abnormality of the cervical spine. Only relatively advanced chronic degenerative changes are seen. This report was made using voice transcription. Despite my best efforts to avoid any, transcription errors may persist. If there is any question about the accuracy of the report or need for clarification, then please call [phone], or text me through [email or web address] for clarification or correction. The Critical findings acute intracranial hemorrhage was discussed with Dr. Martita Cabral, by myself personally at 7:40 PM on 11/14/2021. CT MAXILLOFACIAL WO CONT    Result Date: 11/14/2021  CT HEAD, AND CERVICAL SPINE WITHOUT CONTRAST, 11/14/2021. History: Fall 8 days ago. Comparison: None. Technique:   5 mm axial scans from the skull base to the vertex of the head, axial 2.5 mm scans from above the orbits through the mandible with coronal reconstructed images were also performed of the face, and 1.25 mm axial scans from the skull base into the upper chest performed, and sagittal and coronal reconstructed images performed of the cervical spine. All CT scans performed at this facility use one or all of the following: Automated exposure control, adjustment of the mA and/or kVp according to patient's size, iterative reconstruction. CT HEAD: Findings: The mixed attenuation subdural collection is seen along the right lateral high convexity which demonstrates hyperdense components. The appearance is most concerning for an acute on chronic subdural hematoma. This measures up to 13 mm in width.   Mild mass effect is seen with right to left midline shift measuring 6 mm at this time. .  The ventricles are normal in size and configuration. No evidence of midline shift or obvious mass effect is seen. No abnormal edema pattern is seen in a vascular distribution to suggest large artery infarction. Evaluation with bone windows shows no acute osseous abnormality of the bony calvarium. No abnormal fluid collections are seen associated with the aerated sinuses. CT FACIAL BONES :Multiple fractures are suggested of the left maxillary sinus. This is most clearly demonstrated in the posterior wall of the maxillary sinus although does appear to extend through the left orbital floor seen on axial image 48. No abnormal depression of the orbital floor is currently seen. Some component of fracture line appears to extend to the most anterior aspect of the left zygoma seen on axial image 46. Inferior fracture line extends to the root of the left second maxillary premolar. Fluid is seen within the left axillary sinus which may represent secondary fluid. No acute facial bone fractures otherwise seen. CT CERVICAL SPINE: The skull base is grossly intact. No acute fracture line of the cervical spine is directly visualized. Only chronic degenerative changes are seen with moderate to advanced degenerative changes throughout the cervical spine involving both disc levels and posterior facets. Mild alignment abnormality is are seen without acute findings of posterior elements likely related to facet hypertrophic degenerative changes. .  The dens is intact, and the pre dens space is normal. Prevertebral soft tissues are normal. Limited evaluation of the lung apices shows no gross abnormalities. 1.  Acute on chronic right subdural hematoma with mild right to left midline shift measuring 6 mm at the septum pellucidum. 2. Multiple fractures the left maxillary sinus to include a nondisplaced fracture of the left orbital floor as described above. 3. No acute osseous abnormality of the cervical spine.  Only relatively advanced chronic degenerative changes are seen. This report was made using voice transcription. Despite my best efforts to avoid any, transcription errors may persist. If there is any question about the accuracy of the report or need for clarification, then please call [phone], or text me through [email or web address] for clarification or correction. The Critical findings acute intracranial hemorrhage was discussed with Dr. Kusum Villeda, by myself personally at 7:40 PM on 11/14/2021. CT SPINE CERV WO CONT    Result Date: 11/14/2021  CT HEAD, AND CERVICAL SPINE WITHOUT CONTRAST, 11/14/2021. History: Fall 8 days ago. Comparison: None. Technique:   5 mm axial scans from the skull base to the vertex of the head, axial 2.5 mm scans from above the orbits through the mandible with coronal reconstructed images were also performed of the face, and 1.25 mm axial scans from the skull base into the upper chest performed, and sagittal and coronal reconstructed images performed of the cervical spine. All CT scans performed at this facility use one or all of the following: Automated exposure control, adjustment of the mA and/or kVp according to patient's size, iterative reconstruction. CT HEAD: Findings: The mixed attenuation subdural collection is seen along the right lateral high convexity which demonstrates hyperdense components. The appearance is most concerning for an acute on chronic subdural hematoma. This measures up to 13 mm in width. Mild mass effect is seen with right to left midline shift measuring 6 mm at this time. .  The ventricles are normal in size and configuration. No evidence of midline shift or obvious mass effect is seen. No abnormal edema pattern is seen in a vascular distribution to suggest large artery infarction. Evaluation with bone windows shows no acute osseous abnormality of the bony calvarium. No abnormal fluid collections are seen associated with the aerated sinuses.  CT FACIAL BONES :Multiple fractures are suggested of the left maxillary sinus. This is most clearly demonstrated in the posterior wall of the maxillary sinus although does appear to extend through the left orbital floor seen on axial image 48. No abnormal depression of the orbital floor is currently seen. Some component of fracture line appears to extend to the most anterior aspect of the left zygoma seen on axial image 46. Inferior fracture line extends to the root of the left second maxillary premolar. Fluid is seen within the left axillary sinus which may represent secondary fluid. No acute facial bone fractures otherwise seen. CT CERVICAL SPINE: The skull base is grossly intact. No acute fracture line of the cervical spine is directly visualized. Only chronic degenerative changes are seen with moderate to advanced degenerative changes throughout the cervical spine involving both disc levels and posterior facets. Mild alignment abnormality is are seen without acute findings of posterior elements likely related to facet hypertrophic degenerative changes. .  The dens is intact, and the pre dens space is normal. Prevertebral soft tissues are normal. Limited evaluation of the lung apices shows no gross abnormalities. 1.  Acute on chronic right subdural hematoma with mild right to left midline shift measuring 6 mm at the septum pellucidum. 2. Multiple fractures the left maxillary sinus to include a nondisplaced fracture of the left orbital floor as described above. 3. No acute osseous abnormality of the cervical spine. Only relatively advanced chronic degenerative changes are seen. This report was made using voice transcription. Despite my best efforts to avoid any, transcription errors may persist. If there is any question about the accuracy of the report or need for clarification, then please call [phone], or text me through [email or web address] for clarification or correction.   The Critical findings acute intracranial hemorrhage was discussed with Dr. Velvet Christine, by myself personally at 7:40 PM on 11/14/2021. CT ABD PELV W CONT    Result Date: 11/14/2021  CT ABDOMEN AND PELVIS WITH INTRAVENOUS CONTRAST DATED 11/14/2021. History: Fall 8 days ago. Worsening abdominal pain. Comparison: None. Technique:   Multiple contiguous helical CT images reconstructed at 5 mm intervals were obtained from above the diaphragms through the ischial tuberosities following oral and 100 cc [id number] without acute complication. All CT scans performed at this facility use one or all of the following: Automated exposure control, adjustment of the mA and/or kVp according to patient's size, iterative reconstruction. Findings: CT ABDOMEN:  Limited evaluation of the lung bases and base of the mediastinum demonstrates no significant abnormalities. The Liver appears fatty infiltrated although homogeneous. Pneumobilia seen in the left lobe. The spleen is homogeneous in attenuation. No contour deforming or enhancing mass lesions are seen of the pancreas or adrenal glands. The gallbladder has been removed. The kidneys enhance symmetrically and no evidence of hydronephrosis is seen. Changes are seen in the proximal stomach which may be related to a prior process such as fundoplication. The visualized loops of small bowel and colon are normal in caliber. The appendix is not clearly seen. No acute inflammatory structures suggested adjacent to the cecal tip. Moderate diverticulosis is seen of the sigmoid colon chest or has well without etiology May be has left-sided sinusitis. 40) symptomatic O that was one follicle in the. No free fluid, free air, or focal inflammatory changes are seen in the abdomen. No adenopathy is seen. The abdominal aorta demonstrates mild atherosclerotic calcification. No retroperitoneal hematoma is seen. . No acute osseous abnormality seen of the lumbar spine. CT PELVIS: No abnormal pelvic fluid collections or inflammatory changes are present. No pelvic adenopathy is seen. The urinary bladder is unremarkable. No acute osseous abnormality is seen of the bony pelvis. 1.  No significant acute findings in the abdomen or pelvis. This report was made using voice transcription. Despite my best efforts to avoid any, transcription errors may persist. If there is any question about the accuracy of the report or need for clarification, then please call [phone], or text me through perfectserv for clarification or correction.        Current Meds:  Current Facility-Administered Medications   Medication Dose Route Frequency    amLODIPine (NORVASC) tablet 5 mg  5 mg Oral QPM    carvediloL (COREG) tablet 25 mg  25 mg Oral BID WITH MEALS    cholecalciferol (VITAMIN D3) (1000 Units /25 mcg) tablet 2,000 Units  2,000 Units Oral DAILY    cholestyramine-aspartame (QUESTRAN LIGHT) packet 4 g  4 g Oral BID WITH MEALS    cyanocobalamin tablet 500 mcg  500 mcg Oral 7am    losartan (COZAAR) tablet 100 mg  100 mg Oral DAILY    magnesium oxide (MAG-OX) tablet 400 mg  400 mg Oral BID    Thyroid (Pork) tab 180 mg- patient supplied (Patient Supplied)  180 mg Oral DAILY    sodium chloride (NS) flush 5-40 mL  5-40 mL IntraVENous Q8H    sodium chloride (NS) flush 5-40 mL  5-40 mL IntraVENous PRN    acetaminophen (TYLENOL) tablet 650 mg  650 mg Oral Q6H PRN    Or    acetaminophen (TYLENOL) suppository 650 mg  650 mg Rectal Q6H PRN    polyethylene glycol (MIRALAX) packet 17 g  17 g Oral DAILY PRN    tuberculin injection 5 Units  5 Units IntraDERMal ONCE    sucralfate (CARAFATE) tablet 1 g  1 g Oral AC&HS    hydrALAZINE (APRESOLINE) 20 mg/mL injection 20 mg  20 mg IntraVENous Q6H PRN    hydroCHLOROthiazide (HYDRODIURIL) tablet 25 mg  25 mg Oral DAILY    pantoprazole (PROTONIX) 40 mg in 0.9% sodium chloride 10 mL injection  40 mg IntraVENous DAILY    dextrose 40% (GLUTOSE) oral gel 1 Tube  15 g Oral PRN    glucagon (GLUCAGEN) injection 1 mg  1 mg IntraMUSCular PRN    dextrose (D50W) injection syrg 12.5-25 g  25-50 mL IntraVENous PRN    dextrose 5 % - 0.45% NaCl infusion  75 mL/hr IntraVENous CONTINUOUS    sodium chloride (NS) flush 5-10 mL  5-10 mL IntraVENous Q8H    sodium chloride (NS) flush 5-10 mL  5-10 mL IntraVENous PRN    morphine injection 1 mg  1 mg IntraVENous Q4H PRN    ondansetron (ZOFRAN) injection 4 mg  4 mg IntraVENous Q4H PRN       Signed:  Max Dennis MD    Part of this note may have been written by using a voice dictation software. The note has been proof read but may still contain some grammatical/other typographical errors.

## 2021-11-15 NOTE — CONSULTS
LEAPFROG PROTOCOL NOTE    Juarez Linda  11/15/2021    The patient is currently in the critical care setting managed by Dr. Dr. Adele Ferrara with [facility]. The patient's chart is reviewed and the patient is discussed with the staff. Patient is currently hemodynamically stable. Patient has no needs identified for Intensivist management in the critical care setting at this time. Please notify us if can be of assistance. No charge billed to the patient. Thank you.     SKY Eason

## 2021-11-15 NOTE — PROGRESS NOTES
There is a high probability of acute organ impairment or life-threatening deterioration in the patient's condition from: SDH  Critical care interventions: ICU care   Total critical care time spent: 35  minutes. Time is indicative of direct patient attendance at bedside and on the patient's floor nearby. Includes time spent at bedside performing history and exam, performing chart review, discussing findings and treatment plan with patient and/or family, discussing patient with consultants and colleagues, ordering and reviewing pertinent laboratory and radiographic evaluations, and discussing patient with nursing staff. Time excludes procedures.     Steven He MD

## 2021-11-15 NOTE — ED NOTES
TRANSFER - OUT REPORT:    Verbal report given to Frida RN (name) on Juju Lechuga  being transferred to [facility](unit) for routine progression of care       Report consisted of patients Situation, Background, Assessment and   Recommendations(SBAR). Information from the following report(s) SBAR, ED Summary, MAR, Recent Results and Cardiac Rhythm NSR was reviewed with the receiving nurse. Lines:   Peripheral IV 11/14/21 Left; Posterior Forearm (Active)        Opportunity for questions and clarification was provided.       Patient transported with:   Monitor  Registered Nurse

## 2021-11-15 NOTE — PROGRESS NOTES
ACUTE PHYSICAL THERAPY GOALS:  (Developed with and agreed upon by patient and/or caregiver. )  LTG:  (1.)Ms. Spears will move from supine to sit and sit to supine , scoot up and down and roll side to side in bed with MODIFIED INDEPENDENCE within 7 treatment day(s). (2.)Ms. Spears will transfer from bed to chair and chair to bed with MODIFIED INDEPENDENCE using the least restrictive device within 7 treatment day(s). (3.)Ms. Spears will ambulate with SUPERVISION for 500 feet with the least restrictive device within 7 treatment day(s). (4.)Ms. Spears will perform standing static and dynamic balance activities x 15 minutes with SUPERVISION to improve safety within 7 day(s).       ________________________________________________________________________________________________          ________________________________________________________________________________________________      PHYSICAL THERAPY ASSESSMENT: Initial Assessment and AM PT Treatment Day # 1      Cirilo Reeves is a 78 y.o. female   PRIMARY DIAGNOSIS: <principal problem not specified>  [facility] [F31.7U0E]  Procedure(s) (LRB):  ESOPHAGOGASTRODUODENOSCOPY (EGD)/ 3105 (N/A)     Reason for Referral:    ICD-10: Treatment Diagnosis: Difficulty in walking, Not elsewhere classified (R26.2)  [place] / [place] B / Product Type: Medicare /     ASSESSMENT:     REHAB RECOMMENDATIONS:   Recommendation to date pending progress:  Setting:   Outpatient Therapy  Equipment:    To Be Determined     PRIOR LEVEL OF FUNCTION:  (Prior to Hospitalization) INITIAL/CURRENT LEVEL OF FUNCTION:  (Most Recently Demonstrated)   Bed Mobility:   Independent  Sit to Stand:   Independent  Transfers:   Independent  Gait/Mobility:   Independent Bed Mobility:   Minimal Assistance x 2  Sit to Stand:   Minimal Assistance x 2  Transfers:   Minimal Assistance x 2  Gait/Mobility:   Minimal Assistance x 2 ASSESSMENT:  Ms. Xiao Cunningham presents to PT with [facility] AROM and WFL strength in B LEs. Pt reported intact B LE sensation and decreased L LE coordination. Pt participated in bed mobility with Paulina x 2 and good-fair sitting balance. Pt also presented with abnormal movements in head/neck today but stated this has been going on for some time. Pt was able to stand and transfer to chair with Paulina x 2/RW and poor standing balance. Pt reported ongoing dry heaving for years and this behavior worsened with brushing teeth. Ms. Xiao Cunningham could benefit from skilled PT as she is currently functioning below her baseline. SUBJECTIVE:   Ms. Xiao Cunningham states, \"I didn't want to look like this. \"    SOCIAL HISTORY/LIVING ENVIRONMENT: Lives with spouse and independent with ambulation PTA; several recent falls  Home Environment: Private residence  One/Two Story Residence: One story  Living Alone: No  Support Systems: Spouse/Significant Other, Child(rayne)  OBJECTIVE:     PAIN: VITAL SIGNS: LINES/DRAINS:   Pre Treatment: Pain Screen  Pain Scale 1: Numeric (0 - 10)  Pain Intensity 1: 7  Pain Onset 1: ongoing  Pain Location 1: Abdomen  Post Treatment: 7   IV  O2 Device: Nasal cannula     GROSS EVALUATION:  B LE Within Functional Limits Abnormal/ Functional Abnormal/ Non-Functional (see comments) Not Tested Comments:   AROM [x] [] [] []    PROM [] [] [] []    Strength [x] [] [] []    Balance [] [x] [] [] Poor standing   Posture [] [] [] []    Sensation [x] [] [] []    Coordination [] [x] [] [] Decreased L LE coordination   Tone [] [] [] []    Edema [] [] [] []    Activity Tolerance [] [x] [] [] Dry heaving increased with activity    [] [] [] []      COGNITION/  PERCEPTION: Intact Impaired   (see comments) Comments:   Orientation [x] []    Vision [] []    Hearing [] []    Command Following [x] []    Safety Awareness [x] []     [] []      MOBILITY: I Mod I S SBA CGA Min Mod Max Total  NT x2 Comments:   Bed Mobility    Rolling [] [] [] [] [] [x] [] [] [] [] []    Supine to Sit [] [] [] [] [] [x] [] [] [] [] [x]    Scooting [] [] [] [] [] [] [] [] [] [] []    Sit to Supine [] [] [] [] [] [] [] [] [] [] []    Transfers    Sit to Stand [] [] [] [] [] [x] [] [] [] [] [x]    Bed to Chair [] [] [] [] [] [x] [] [] [] [] [x]    Stand to Sit [] [] [] [] [] [x] [] [] [] [] [x]    I=Independent, Mod I=Modified Independent, S=Supervision, SBA=Standby Assistance, CGA=Contact Guard Assistance,   Min=Minimal Assistance, Mod=Moderate Assistance, Max=Maximal Assistance, Total=Total Assistance, NT=Not Tested  GAIT: I Mod I S SBA CGA Min Mod Max Total  NT x2 Comments:   Level of Assistance [] [] [] [] [] [x] [] [] [] [] [x]    Distance 2 ft    DME Rolling Walker    Gait Quality Increased trunk sway    Weightbearing Status N/A     I=Independent, Mod I=Modified Independent, S=Supervision, SBA=Standby Assistance, CGA=Contact Guard Assistance,   Min=Minimal Assistance, Mod=Moderate Assistance, Max=Maximal Assistance, Total=Total Assistance, NT=Not Tested    [address] [id number] [facility]       How much difficulty does the patient currently have. .. Unable A Lot A Little None   1. Turning over in bed (including adjusting bedclothes, sheets and blankets)? [] 1   [] 2   [] 3   [x] 4   2. Sitting down on and standing up from a chair with arms ( e.g., wheelchair, bedside commode, etc.)   [] 1   [] 2   [x] 3   [] 4   3. Moving from lying on back to sitting on the side of the bed? [] 1   [] 2   [x] 3   [] 4   How much help from another person does the patient currently need. .. Total A Lot A Little None   4. Moving to and from a bed to a chair (including a wheelchair)? [] 1   [] 2   [x] 3   [] 4   5. Need to walk in hospital room? [] 1   [] 2   [x] 3   [] 4   6. Climbing 3-5 steps with a railing? [] 1   [] 2   [x] 3   [] 4   © 2007, Trustees of [address] [place] [address] [address] [id number], under license to [employer].  All rights reserved Score:  Initial: 19 Most Recent: X (Date: -- )    Interpretation of Tool:  Represents activities that are increasingly more difficult (i.e. Bed mobility, Transfers, Gait). PLAN:   FREQUENCY/DURATION: PT Plan of Care: 3 times/week for duration of hospital stay or until stated goals are met, whichever comes first.    PROBLEM LIST:   (Skilled intervention is medically necessary to address:)  1. Decreased Activity Tolerance  2. Decreased Balance  3. Decreased Coordination  4. Decreased Gait Ability  5. Decreased Transfer Abilities   INTERVENTIONS PLANNED:   (Benefits and precautions of physical therapy have been discussed with the patient.)  1. Therapeutic Activity  2. Therapeutic Exercise/HEP  3. Neuromuscular Re-education  4. Gait Training  5. Manual Therapy  6. Education     TREATMENT:     EVALUATION: Low Complexity : (Untimed Charge)    TREATMENT:   ($$ Therapeutic Activity: 23-37 mins    )  Co-Treatment PT/OT necessary due to patient's decreased overall endurance/tolerance levels, as well as need for high level skilled assistance to complete functional transfers/mobility and functional tasks  Therapeutic Activity (23 Minutes): Therapeutic activity included Rolling, Supine to Sit, Scooting, Transfer Training, Ambulation on level ground, Sitting balance  and Standing balance to improve functional Mobility, Strength and Activity tolerance.     TREATMENT GRID:  N/A    AFTER TREATMENT POSITION/PRECAUTIONS:  Alarm Activated, Chair and Needs within reach    INTERDISCIPLINARY COLLABORATION:  RN/PCT, PT/PTA and OT/CAMPOS    TOTAL TREATMENT DURATION:  PT Patient Time In/Time Out  Time In: 0923  Time Out: Delfino Youngblood PT, DPT

## 2021-11-15 NOTE — PROGRESS NOTES
NS   CT BASICALLY UNCHANGED:  LESS ISODENSE WITH MORE APPARENT ACUTE COMPONENT    A/P WILL FOLLOW  CLOSELY  CT IN AM  FULL CONSULT  TO  Manfred Mason MD

## 2021-11-15 NOTE — ACP (ADVANCE CARE PLANNING)
Advance Care Planning     General Advance Care Planning (ACP) Conversation      Date of Conversation: 11/14/2021      Healthcare Decision Maker:     Primary Decision Maker: Brian Spears - Spouse - [phone]  Click here to complete [address] including selection of the Healthcare Decision Maker Relationship (ie \"Primary\")      Today we documented Decision Maker(s) consistent with Legal Next of Kin hierarchy. Content/Action Overview:   No LW/HCPOA, legal NOK, Spouse, Brian. Per son, pt's spouse does have some memory issues. Pt has 2 children, son and daughter, son, Risa Sanchez, lives here. Daughter, lives in [place], [place]. Full code per MD orders currently.      Length of Voluntary ACP Conversation in minutes:  <16 minutes (Non-Billable)    Escobar York RN

## 2021-11-15 NOTE — CONSULTS
[facility] Consult Note       Primary GI Physician: Dr Avis Jack    Referring Provider:  Dr Elva Capellan Date:  11/15/2021    Admit Date:  11/14/2021    Chief Complaint:  ABS Pain    Subjective:     History of Present Illness:  Patient is a 78 y.o. female with PMH including but not limited to SOD dysfunction, chronic pancreatitis, conversion disorder, nephrolithiasis, GERD, PUD, TIA, IBS , who is seen in consultation at the request of Dr. Ricardo Conklin for ABD pain. She was admitted 11/14 with recurrent nausea/ abd pain/ diarrhea with recent fall. During her fall, she sit her face and knocked out 2 left sided front teeth. Has not seen dental to date. CT head shows acute on chronic right SDH with mild right to left midline shift of 6 mm as well as left maxillary sinus fractures and a nondisplaced left orbital floor fracture. Neurosurgery has consulted and no acute surgical needs noted. ENT consult pending. She reports RUQ to epigastric pain since her ERCP continuous with dry heaves and nausea. She denies melena, hematemesis or coffee ground emesis. She had a fall at home 2 weeks ago and reports taking Ibuprofen since that time for facial pain. She is not on a PPI but takes Carafate. She also takes Colestid at home prn. She reports eating very little and losing 33 pounds over the last yesr. She denies significant diarrhea now. CTAP 11/14 no acute findings. Labs reviewed with hgb 11.8, WBC 6.5, LFT's wnl, Lipase 80    She was last seen in our office 8/3/21 with flare of pancreatitis with abd pain N/V. She was taking phenergan 25 mg prn and colestipol before meals. She underwent ERCP 9/1/21 with antral erosions and pancreatic stent removal, CBD was normal.  Pancreatic stent was placed 12/2020.   Last colonoscopy 10/25/16 with diverticulosis and IH.       CT A/P 11/14/21  Findings:  CT ABDOMEN:    Limited evaluation of the lung bases and base of the mediastinum demonstrates no  significant abnormalities. The Liver appears fatty infiltrated although homogeneous. Pneumobilia seen in  the left lobe. The spleen is homogeneous in attenuation. No contour deforming  or enhancing mass lesions are seen of the pancreas or adrenal glands. The  gallbladder has been removed. The kidneys enhance symmetrically and no evidence  of hydronephrosis is seen. Changes are seen in the proximal stomach which may be related to a prior process  such as fundoplication. The visualized loops of small bowel and colon are normal  in caliber. The appendix is not clearly seen. No acute inflammatory structures  suggested adjacent to the cecal tip. Moderate diverticulosis is seen of the  sigmoid colon chest or has well without etiology May be has left-sided  sinusitis. 40) symptomatic O that was one follicle in the. No free fluid, free  air, or focal inflammatory changes are seen in the abdomen. No adenopathy is  seen. The abdominal aorta demonstrates mild atherosclerotic calcification. No  retroperitoneal hematoma is seen. . No acute osseous abnormality seen of the  lumbar spine. CT PELVIS:  No abnormal pelvic fluid collections or inflammatory changes are present. No  pelvic adenopathy is seen. The urinary bladder is unremarkable. No acute  osseous abnormality is seen of the bony pelvis. IMPRESSION  1. No significant acute findings in the abdomen or pelvis. ERCP Dr Chemo Keller  9/1/2021  PROCEDURE: After informed consent, the patient was placed under anesthesia in the semi-prone position. The duodenoscope was passed without difficulty to the area of the ampulla. A standard cannulation and ERCP was performed with the findings as outlined and described below. Patient tolerated the procedure well. ASSESSMENT:  1. Pancreatic stent removed  2. Pancreas- swept with 12 mm balloon- no stones; minor stricture at junction of head and body  3. GB absent  4. CBD normal  5.  Antral erosions  PLAN:  1. F/u 3 months  2. carafate       PMH:  Past Medical History:   Diagnosis Date    Abdominal pain, generalized     Arthritis     left thumb    Backache     Cerebrovascular disease     Diarrhea     Encounter for long-term (current) drug use 11/13/2015    Endometriosis     Esophageal stricture     stretched in 05/2012    Esophageal ulcer     Fatigue     Gastric ulcer 9/2/2011    Gastric ulcer 8/11/2016    1. EGD May 2015 with several ulcers.  GERD (gastroesophageal reflux disease)     no treatment at this time.      Hearing loss     Herpes zoster     Hiatal hernia--s/p laparoscopoic nissen  7/18/2012    History of CVA (cerebrovascular accident) Dx: 2014    no deficits    History of DVT of lower extremity 9/30/2013    RLE     History of seizure     once/ 2 days after surgery    Hormone replacement therapy     Hormone Pellets    Hx of deep vein thrombophlebitis of lower extremity     total 3. 2 blood clots in 1975 and 1 blood clot in 1981 to Right knee area    Hypertension     managed with meds    Hypokalemia 9/2/2011    Hypothyroid     managed with meds    IBS (irritable bowel syndrome)     diet controlled     Itchy scalp     Long term (current) use of aspirin     Migraine     Nausea with vomiting     Neoplasm of uncertain behavior of skin     Personal history of kidney stones     passed on her own    Pulmonary nodule     Shoulder pain     Skin tag     neck, #25 excised    Sphincter of Oddi dysfunction 2017    Stroke ([address]     Syncope     URI (upper respiratory infection)     Vitamin D deficiency 11/13/2015       PSH:  Past Surgical History:   Procedure Laterality Date    COLONOSCOPY      EGD  2011    Dilation    ERCP  1996, 2011    Papillotomy    ERCP  12/9/2020         ERCP  9/1/2021         HX APPENDECTOMY      HX BREAST LUMPECTOMY      HX ENDOSCOPY  01/12/2018    stent removal    HX ERCP  2017    pancreatic stent    HX GI      Nissen fundiplication    HX LAP CHOLECYSTECTOMY      HX BALJINDER AND BSO      HX TONSILLECTOMY  childhood       Allergies: Allergies   Allergen Reactions    Benadryl [Diphenhydramine Hcl] Anxiety    Codeine Itching    Compazine [Prochlorperazine Edisylate] Other (comments)     Left face drawing    Nubain [Nalbuphine] Anxiety    Stadol [Butorphanol Tartrate] Anxiety    Talwin [Pentazocine Lactate] Anxiety    Trilafon Anxiety    Ultram [Tramadol] Unknown (comments)     Pt denies       Home Medications:  Prior to Admission medications    Medication Sig Start Date End Date Taking? Authorizing Provider   amLODIPine (Norvasc) 5 mg tablet Take 5 mg by mouth every evening. Yes Provider, Historical   losartan-hydroCHLOROthiazide (Hyzaar) 100-25 mg per tablet Take 1 Tablet by mouth nightly. Yes Provider, Historical   Cholestyramine-Aspartame (Cholestyramine Light) 4 gram powder MIX 1 SCOOP WITH WATER OR JUICE AND TAKE ORALLY TWICE A DAY FOR CHRONIC DIARRHEA 4/1/21  Yes Xenia Oconnell MD   ketoconazole (NIZORAL) 2 % shampoo SHAMPOO THREE TIMES A WEEK EVERY 4 WEEKS. 3/15/21  Yes Xenia Oconnell MD   Thyroid, Pork, ([place] Thyroid) 180 mg tab Take 180 mg by mouth daily. Yes Provider, Historical   clobetasoL (TEMOVATE) 0.05 % external solution Apply 1 Dose to affected area two (2) times a day. 6/24/20  Yes Xenia Oconnell MD   carvediloL (COREG) 25 mg tablet Take 1 Tab by mouth two (2) times daily (with meals). 5/29/20  Yes Titus Deluca MD   promethazine (PHENERGAN) 50 mg tablet Take 1 Tab by mouth every six (6) hours as needed for Nausea. 8/23/19  Yes Xenia Oconnell MD   magnesium oxide (MAG-OX) 400 mg tablet Take 400 mg by mouth two (2) times a day. Yes Provider, Historical   PRASTERONE, DHEA, (DHEA PO) Take 100 mg by mouth daily. Yes Provider, Historical   aspirin (ASPIRIN) 325 mg tablet Take 325 mg by mouth every morning.    Yes Provider, Historical   cyanocobalamin (VITAMIN B-12) 500 mcg tablet Take 500 mcg by mouth every morning. Yes Provider, Historical   cholecalciferol, vitamin D3, (VITAMIN D3) 2,000 unit Tab Take 1 Tab by mouth every morning.      Yes Provider, Historical       Hospital Medications:  Current Facility-Administered Medications   Medication Dose Route Frequency    amLODIPine (NORVASC) tablet 5 mg  5 mg Oral QPM    carvediloL (COREG) tablet 25 mg  25 mg Oral BID WITH MEALS    cholecalciferol (VITAMIN D3) (1000 Units /25 mcg) tablet 2,000 Units  2,000 Units Oral DAILY    cholestyramine-aspartame (QUESTRAN LIGHT) packet 4 g  4 g Oral BID WITH MEALS    cyanocobalamin tablet 500 mcg  500 mcg Oral 7am    losartan (COZAAR) tablet 100 mg  100 mg Oral DAILY    magnesium oxide (MAG-OX) tablet 400 mg  400 mg Oral BID    Thyroid (Pork) tab 180 mg- patient supplied (Patient Supplied)  180 mg Oral DAILY    sodium chloride (NS) flush 5-40 mL  5-40 mL IntraVENous Q8H    sodium chloride (NS) flush 5-40 mL  5-40 mL IntraVENous PRN    acetaminophen (TYLENOL) tablet 650 mg  650 mg Oral Q6H PRN    Or    acetaminophen (TYLENOL) suppository 650 mg  650 mg Rectal Q6H PRN    polyethylene glycol (MIRALAX) packet 17 g  17 g Oral DAILY PRN    tuberculin injection 5 Units  5 Units IntraDERMal ONCE    sucralfate (CARAFATE) tablet 1 g  1 g Oral AC&HS    hydrALAZINE (APRESOLINE) 20 mg/mL injection 20 mg  20 mg IntraVENous Q6H PRN    hydroCHLOROthiazide (HYDRODIURIL) tablet 25 mg  25 mg Oral DAILY    pantoprazole (PROTONIX) 40 mg in 0.9% sodium chloride 10 mL injection  40 mg IntraVENous DAILY    dextrose 40% (GLUTOSE) oral gel 1 Tube  15 g Oral PRN    glucagon (GLUCAGEN) injection 1 mg  1 mg IntraMUSCular PRN    dextrose (D50W) injection syrg 12.5-25 g  25-50 mL IntraVENous PRN    dextrose 5 % - 0.45% NaCl infusion  75 mL/hr IntraVENous CONTINUOUS    sodium chloride (NS) flush 5-10 mL  5-10 mL IntraVENous Q8H    sodium chloride (NS) flush 5-10 mL  5-10 mL IntraVENous PRN    morphine injection 1 mg  1 mg IntraVENous Q4H PRN    ondansetron (ZOFRAN) injection 4 mg  4 mg IntraVENous Q4H PRN       Social History:  Social History     Tobacco Use    Smoking status: Never Smoker    Smokeless tobacco: Never Used   Substance Use Topics    Alcohol use: Yes     Alcohol/week: 3.0 standard drinks     Types: 3 Glasses of wine per week       Pt denies any history of drug use, blood transfusions, or tattoos. Family History:  Family History   Problem Relation Age of Onset    Heart Disease Mother     Heart Disease Father     Hypertension Brother     Hypertension Brother     Diabetes Brother     Cancer Other         aunt, bone ca    Cancer Other         3 aunts with breast ca    Cancer Maternal Aunt        Review of Systems:  A detailed 10 system ROS is obtained, with pertinent positives as listed above. All others are negative. Diet:  Regular    Objective:     Physical Exam:  Vitals:  Visit Vitals  /64   Pulse 61   Temp 97.9 °F (36.6 °C)   Resp 17   Ht 5' 2.5\" (1.588 m)   Wt 57.6 kg (126 lb 15.8 oz)   SpO2 98%   Breastfeeding No   BMI 22.86 kg/m²     Gen:  Pt is alert, cooperative, no acute distress  Skin:  Extremities and face reveal no rashes. HEENT: Sclerae anicteric. Extra-occular muscles are intact. No oral ulcers. No abnormal pigmentation of the lips. The neck is supple. Cardiovascular: Regular rate and rhythm. No murmurs, gallops, or rubs. Respiratory:  Comfortable breathing with no accessory muscle use. Clear breath sounds anteriorly with no wheezes, rales, or rhonchi. GI:  Abdomen nondistended, soft, TTP RUQ/epigastrium. Normal active bowel sounds. No enlargement of the liver or spleen. No masses palpable. Rectal:  Deferred  Musculoskeletal:  No pitting edema of the lower legs. Neurological:  Gross memory appears intact. Patient is alert and oriented. Psychiatric:  Mood appears appropriate with judgement intact. Lymphatic:  No cervical or supraclavicular adenopathy.     Laboratory:    Recent Labs 11/15/21  0321 11/14/21  1749   WBC 6.5 6.2   HGB 11.8 13.5   HCT 36.8 39.5    286   MCV 94.8 92.9    132*   K 4.5 4.4    99   CO2 21 22   BUN 5* 6*   CREA 0.66 0.79   CA 7.3* 8.5   MG  --  1.9   GLU 52* 70   AP 91 109   AST 25 27   ALT 14 21   TBILI 0.5 0.5   ALB 2.8* 3.3   TP 6.5 7.5   LPSE  --  80          Assessment:     Active Problems:    Hypothyroidism (9/30/2013)      GERD (gastroesophageal reflux disease) (9/30/2013)      Overview: S/p Nissen Fundoplication      Essential hypertension (9/30/2013)      Overview: 1.  Echo (5/20/14) : Normal LV systolic function. EF 55-60%. Normal       diastolic function. RVSP 40-45. Mild to moderate tricuspid       regurgitation. Mild mitral regurgitation. 2.  Renal artery US (7/8/14): No evidence of renal artery stenosis. 3.  Plasma Metanephrine. (6/25/14): Normal      4. Echo (11/14):  EF 62-24%  Grade 1 diastolic dysfunction. Mild mitral       regurgitaiton. Dyslipidemia (5/20/2014)      Peptic ulcer disease (8/11/2016)      Overview: 1. EGD May 2015 with several ulcers. Subdural hematoma (HCC) (11/14/2021)      Abdominal pain (11/14/2021)      Chronic diarrhea (11/15/2021)      Orbital floor fracture ([address] (11/15/2021)      Maxillary sinus fracture ([address] (11/15/2021)      78 y.o. female with PMH including but not limited to SOD dysfunction, chronic pancreatitis, conversion disorder, nephrolithiasis, GERD, PUD, TIA, IBS admitted with  recurrent nausea/ abd pain/ diarrhea with recent fall. CT head shows acute on chronic right SDH with mild right to left midline shift of 6 mm as well as left maxillary sinus fractures and a nondisplaced left orbital floor fracture. Neurosurgery has consulted and no acute surgical needs noted. ENT consult pending. She reports RUQ to epigastric pain since her ERCP continuous with dry heaves and nausea. Reports taking Ibuprofen since her fall for facial pain.  She reports eating very little and losing 33 pounds over the last yesr. She denies significant diarrhea now. CTAP 11/14 no acute findings. Labs reviewed with hgb 11.8, WBC 6.5, LFT's wnl, Lipase 80    Plan:     - PPI BID  - NPO after MN for EGD tomorrow to evaluate for PUD  - Supportive care with antiemetics     Aden Reasons, NP  Patient is seen and examined in collaboration with Dr. Alphonso Anderson. Assessment and plan as per Dr. Stefani Goldberg.

## 2021-11-15 NOTE — H&P
Hospitalist History and Physical   Admit Date:  2021  5:28 PM   Name:  Ramiro Stacy   Age:  78 y.o. Sex:  female  :  1945   MRN:  [id number]   Room:  [facility]/    Presenting Complaint: Abdominal Pain    Reason(s) for Admission: No admission diagnoses are documented for this encounter. History of Present Illness:   Ramiro Stacy is a 78 y.o. female with medical history of chronic pancreatitis,conversion disorder,  hx of kidney stones, GERD, PUD, hx of TIA, IBS, who presented with report of abdominal pain. Patient with hx of chronic pancreatitis s/p pancreatic stent placement that was removed during ERCP on 2021. Also noted to have antral erosions and started on carafate. Patient reports her abdominal pain returned 2 weeks ago and described as epigastric. She reports she has dry heaving after every attempt at PO intake and says everything she eats \"goes right through her\" - typically with 4-6 watery stools daily. Has been taken ibuprofen 4-5 x daily for abdominal pain and headaches. Also of importance patient had a fall 11 days ago. Said she had a trip and fall in the kitchen and landed on her face, knocking out two of her teeth. Reports she did not go see the any doctors because she was embarrased by her appearance after the fall. Has subsequently had near constant diffuse frontal headache. Denies vision changes, difficulty with speech or swallow. Does admit to frequent vertigo symptoms. Family points out some mild memory loss over details in story (?baseline). Reports a fall with resultant SDH one year ago - says she was seen at [facility] ER and sent home with instructions to call neurosurgery but never followed up. ER workup notable for wbc 6.2, hgb 13, plts 286, Na 132, K 4.4, Cl 99, bicarb 22, BUN 6, Cr 0.79, Lipase 80, LA 3.2,     CT AP- unremarkable, CT head - \"acute/chronic right subdural hematoma with mild right to left midline shift measuring 6mm.  Multiple fractures of left maxillary sinus to include nondisplaced fracture of left orbital floor. \"  CT cspine non acute. Hospitalist asked to admit. Review of Systems:  10 systems reviewed and negative except as noted in HPI. Assessment & Plan:     # Subdural Hematoma - acute on chronic with midline shift  - Neurosx consulted - non surgical  - Repeat CT head non contrast in AM  - serial neurochecks  - avoid anticoagulation, holding ASA 325mg  - normotensive BP goal    # Left maxillary sinus fractures w/ involvement of left orbital floor  - Consult to ENT for AM eval  - prn px mgmt. # Acute on chronic abdominal pain  - Epigastric, ?r/t PUD  - high risk with recent NSAID use  - IV protonix, carafate  - AVOID NSAIDS  - consult to GI for AM eval  - CLD   - slow IVF    # Chronic diarrhea  - Reportedly worse in last 2 weeks than baseline  - resume cholestyramine  - check cdiff      # HTN  - resume norvasc 5mg, hyzaar 100/25 daily,  - monitor and adjust as needed      # Hypothyroidism  - resume armour thyroid  - check TFTs    # hx of conversion d/o  - aware    # Falls  - denies LOC with recent fall  - PT/OT, Ppd just in case  - check orthostatics      Dispo/Discharge Planning: likely 2-3 days. Please call Brendan Lovell [phone] or son Jarod Segundo [phone]     Diet: DIET NPO  VTE ppx: scd  Code status: Prior         Hospital Problems as of 11/14/2021 Date Reviewed: 7/15/2021    None          Past History:  Past Medical History:   Diagnosis Date    Abdominal pain, generalized     Arthritis     left thumb    Backache     Cerebrovascular disease     Diarrhea     Encounter for long-term (current) drug use 11/13/2015    Endometriosis     Esophageal stricture     stretched in 05/2012    Esophageal ulcer     Fatigue     Gastric ulcer 9/2/2011    Gastric ulcer 8/11/2016    1. EGD May 2015 with several ulcers.  GERD (gastroesophageal reflux disease)     no treatment at this time.      Hearing loss  Herpes zoster     Hiatal hernia--s/p laparoscopoic nissen  7/18/2012    History of CVA (cerebrovascular accident) Dx: 2014    no deficits    History of DVT of lower extremity 9/30/2013    RLE     History of seizure     once/ 2 days after surgery    Hormone replacement therapy     Hormone Pellets    Hx of deep vein thrombophlebitis of lower extremity     total 3. 2 blood clots in 1975 and 1 blood clot in 1981 to Right knee area    Hypertension     managed with meds    Hypokalemia 9/2/2011    Hypothyroid     managed with meds    IBS (irritable bowel syndrome)     diet controlled     Itchy scalp     Long term (current) use of aspirin     Migraine     Nausea with vomiting     Neoplasm of uncertain behavior of skin     Personal history of kidney stones     passed on her own    Pulmonary nodule     Shoulder pain     Skin tag     neck, #25 excised    Sphincter of Oddi dysfunction 2017    Stroke ([facility] [address]     Syncope     URI (upper respiratory infection)     Vitamin D deficiency 11/13/2015     Past Surgical History:   Procedure Laterality Date    COLONOSCOPY      EGD  2011    Dilation    ERCP  1996, 2011    Papillotomy    ERCP  12/9/2020         ERCP  9/1/2021         HX APPENDECTOMY      HX BREAST LUMPECTOMY      HX ENDOSCOPY  01/12/2018    stent removal    HX ERCP  2017    pancreatic stent    HX GI      Nissen fundiplication    HX LAP CHOLECYSTECTOMY      HX BALJINDER AND BSO      HX TONSILLECTOMY  childhood      Allergies   Allergen Reactions    Benadryl [Diphenhydramine Hcl] Anxiety    Codeine Itching    Compazine [Prochlorperazine Edisylate] Other (comments)     Left face drawing    Nubain [Nalbuphine] Anxiety    Stadol [Butorphanol Tartrate] Anxiety    Talwin [Pentazocine Lactate] Anxiety    Trilafon Anxiety    Ultram [Tramadol] Unknown (comments)     Pt denies      Social History     Tobacco Use    Smoking status: Never Smoker    Smokeless tobacco: Never Used   Substance Use Topics    Alcohol use: Yes     Alcohol/week: 3.0 standard drinks     Types: 3 Glasses of wine per week      Family History   Problem Relation Age of Onset    Heart Disease Mother     Heart Disease Father     Hypertension Brother     Hypertension Brother     Diabetes Brother     Cancer Other         aunt, bone ca    Cancer Other         3 aunts with breast ca    Cancer Maternal Aunt       Family history reviewed and negative except as otherwise noted. Immunization History   Administered Date(s) Administered    Influenza High Dose Vaccine PF 01/20/2017    Pneumococcal Conjugate (PCV-13) 01/20/2017    Pneumococcal Polysaccharide (PPSV-23) 04/15/2011     Prior to Admit Medications:  Current Outpatient Medications   Medication Instructions    amLODIPine (NORVASC) 5 mg, Oral, EVERY EVENING    [place] Thyroid 180 mg, Oral, DAILY    aspirin (ASPIRIN) 325 mg, Oral, 7AM    carvediloL (COREG) 25 mg, Oral, 2 TIMES DAILY WITH MEALS    cholecalciferol, vitamin D3, (VITAMIN D3) 2,000 unit Tab 1 Tablet, 7AM    Cholestyramine-Aspartame (Cholestyramine Light) 4 gram powder MIX 1 SCOOP WITH WATER OR JUICE AND TAKE ORALLY TWICE A DAY FOR CHRONIC DIARRHEA    clobetasoL (TEMOVATE) 0.05 % external solution 1 Dose, Topical, 2 TIMES DAILY    cyanocobalamin (VITAMIN B-12) 500 mcg, 7AM    ketoconazole (NIZORAL) 2 % shampoo SHAMPOO THREE TIMES A WEEK EVERY 4 WEEKS.     losartan-hydroCHLOROthiazide (Hyzaar) 100-25 mg per tablet 1 Tablet, Oral, EVERY BEDTIME    magnesium oxide (MAG-OX) 400 mg, Oral, 2 TIMES DAILY    PRASTERONE, DHEA, (DHEA PO) 100 mg, Oral, DAILY    promethazine (PHENERGAN) 50 mg, Oral, EVERY 6 HOURS AS NEEDED       Objective:     Patient Vitals for the past 24 hrs:   Temp Pulse Resp BP SpO2   11/14/21 1841     95 %   11/14/21 1749    (!) 143/117    11/14/21 1733 98.3 °F (36.8 °C) 85 18  95 %     Oxygen Therapy  O2 Sat (%): 95 % (11/14/21 1841)  O2 Device: None (Room air) (11/14/21 1841)    Estimated body mass index is 21.6 kg/m² as calculated from the following:    Height as of 9/1/21: 5' 2.5\" (1.588 m). Weight as of this encounter: 54.4 kg (120 lb). No intake or output data in the 24 hours ending 11/14/21 2036      Physical Exam:    Blood pressure (!) 143/117, pulse 85, temperature 98.3 °F (36.8 °C), resp. rate 18, weight 54.4 kg (120 lb), SpO2 95 %. General:    Well nourished. No overt distress, appears anxious  Head:  Resolving bilateral periorbital ecchymosis, missing left side 2 upper teeth  Eyes:  Sclerae appear normal.  Pupils equally round. ENT:  Nares appear normal, no drainage. Moist oral mucosa  Neck:  No restricted ROM. Trachea midline   CV:   RRR. No m/r/g. No jugular venous distension. Lungs:   CTAB. No wheezing, rhonchi, or rales. Respirations even, unlabored  Abdomen: Bowel sounds present. Soft, nontender, nondistended. Extremities: No cyanosis or clubbing. No edema  Skin:     No rashes and normal coloration. Warm and dry. Neuro:  CN II-XII grossly intact. Sensation intact. A&Ox3  Psych:  Anxious affect. I have reviewed ordered lab tests and independently visualized imaging below:    Last 24hr Labs:  Recent Results (from the past 24 hour(s))   CBC WITH AUTOMATED DIFF    Collection Time: 11/14/21  5:49 PM   Result Value Ref Range    WBC 6.2 4.3 - 11.1 K/uL    RBC 4.25 4.05 - 5.2 M/uL    HGB 13.5 11.7 - 15.4 g/dL    HCT 39.5 35.8 - 46.3 %    MCV 92.9 79.6 - 97.8 FL    MCH 31.8 26.1 - 32.9 PG    MCHC 34.2 31.4 - 35.0 g/dL    RDW 14.6 11.9 - 14.6 %    PLATELET 884 742 - 668 K/uL    MPV 9.2 (L) 9.4 - 12.3 FL    ABSOLUTE NRBC 0.00 0.0 - 0.2 K/uL    DF AUTOMATED      NEUTROPHILS 50 43 - 78 %    LYMPHOCYTES 39 13 - 44 %    MONOCYTES 9 4.0 - 12.0 %    EOSINOPHILS 1 0.5 - 7.8 %    BASOPHILS 1 0.0 - 2.0 %    IMMATURE GRANULOCYTES 0 0.0 - 5.0 %    ABS. NEUTROPHILS 3.1 1.7 - 8.2 K/UL    ABS. LYMPHOCYTES 2.4 0.5 - 4.6 K/UL    ABS.  MONOCYTES 0.6 0.1 - 1.3 K/UL ABS. EOSINOPHILS 0.1 0.0 - 0.8 K/UL    ABS. BASOPHILS 0.1 0.0 - 0.2 K/UL    ABS. IMM. GRANS. 0.0 0.0 - 0.5 K/UL   METABOLIC PANEL, COMPREHENSIVE    Collection Time: 11/14/21  5:49 PM   Result Value Ref Range    Sodium 132 (L) 136 - 145 mmol/L    Potassium 4.4 3.5 - 5.1 mmol/L    Chloride 99 98 - 107 mmol/L    CO2 22 21 - 32 mmol/L    Anion gap 11 7 - 16 mmol/L    Glucose 70 65 - 100 mg/dL    BUN 6 (L) 8 - 23 MG/DL    Creatinine 0.79 0.6 - 1.0 MG/DL    GFR est AA >60 >60 ml/min/1.73m2    GFR est non-AA >60 >60 ml/min/1.73m2    Calcium 8.5 8.3 - 10.4 MG/DL    Bilirubin, total 0.5 0.2 - 1.1 MG/DL    ALT (SGPT) 21 12 - 65 U/L    AST (SGOT) 27 15 - 37 U/L    Alk. phosphatase 109 50 - 136 U/L    Protein, total 7.5 6.3 - 8.2 g/dL    Albumin 3.3 3.2 - 4.6 g/dL    Globulin 4.2 (H) 2.3 - 3.5 g/dL    A-G Ratio 0.8 (L) 1.2 - 3.5     LIPASE    Collection Time: 11/14/21  5:49 PM   Result Value Ref Range    Lipase 80 73 - 393 U/L   LACTIC ACID    Collection Time: 11/14/21  5:49 PM   Result Value Ref Range    Lactic acid 3.2 (H) 0.4 - 2.0 MMOL/L   MAGNESIUM    Collection Time: 11/14/21  5:49 PM   Result Value Ref Range    Magnesium 1.9 1.8 - 2.4 mg/dL       All Micro Results     None          Other Studies:  CT HEAD WO CONT    Result Date: 11/14/2021  CT HEAD, AND CERVICAL SPINE WITHOUT CONTRAST, 11/14/2021. History: Fall 8 days ago. Comparison: None. Technique:   5 mm axial scans from the skull base to the vertex of the head, axial 2.5 mm scans from above the orbits through the mandible with coronal reconstructed images were also performed of the face, and 1.25 mm axial scans from the skull base into the upper chest performed, and sagittal and coronal reconstructed images performed of the cervical spine. All CT scans performed at this facility use one or all of the following: Automated exposure control, adjustment of the mA and/or kVp according to patient's size, iterative reconstruction. CT HEAD: Findings:   The mixed attenuation subdural collection is seen along the right lateral high convexity which demonstrates hyperdense components. The appearance is most concerning for an acute on chronic subdural hematoma. This measures up to 13 mm in width. Mild mass effect is seen with right to left midline shift measuring 6 mm at this time. .  The ventricles are normal in size and configuration. No evidence of midline shift or obvious mass effect is seen. No abnormal edema pattern is seen in a vascular distribution to suggest large artery infarction. Evaluation with bone windows shows no acute osseous abnormality of the bony calvarium. No abnormal fluid collections are seen associated with the aerated sinuses. CT FACIAL BONES :Multiple fractures are suggested of the left maxillary sinus. This is most clearly demonstrated in the posterior wall of the maxillary sinus although does appear to extend through the left orbital floor seen on axial image 48. No abnormal depression of the orbital floor is currently seen. Some component of fracture line appears to extend to the most anterior aspect of the left zygoma seen on axial image 46. Inferior fracture line extends to the root of the left second maxillary premolar. Fluid is seen within the left axillary sinus which may represent secondary fluid. No acute facial bone fractures otherwise seen. CT CERVICAL SPINE: The skull base is grossly intact. No acute fracture line of the cervical spine is directly visualized. Only chronic degenerative changes are seen with moderate to advanced degenerative changes throughout the cervical spine involving both disc levels and posterior facets. Mild alignment abnormality is are seen without acute findings of posterior elements likely related to facet hypertrophic degenerative changes. .  The dens is intact, and the pre dens space is normal. Prevertebral soft tissues are normal. Limited evaluation of the lung apices shows no gross abnormalities.      1.  Acute on chronic right subdural hematoma with mild right to left midline shift measuring 6 mm at the septum pellucidum. 2. Multiple fractures the left maxillary sinus to include a nondisplaced fracture of the left orbital floor as described above. 3. No acute osseous abnormality of the cervical spine. Only relatively advanced chronic degenerative changes are seen. This report was made using voice transcription. Despite my best efforts to avoid any, transcription errors may persist. If there is any question about the accuracy of the report or need for clarification, then please call [phone], or text me through perfectserv for clarification or correction. The Critical findings acute intracranial hemorrhage was discussed with Dr. Kusum Villeda, by myself personally at 7:40 PM on 11/14/2021. CT MAXILLOFACIAL WO CONT    Result Date: 11/14/2021  CT HEAD, AND CERVICAL SPINE WITHOUT CONTRAST, 11/14/2021. History: Fall 8 days ago. Comparison: None. Technique:   5 mm axial scans from the skull base to the vertex of the head, axial 2.5 mm scans from above the orbits through the mandible with coronal reconstructed images were also performed of the face, and 1.25 mm axial scans from the skull base into the upper chest performed, and sagittal and coronal reconstructed images performed of the cervical spine. All CT scans performed at this facility use one or all of the following: Automated exposure control, adjustment of the mA and/or kVp according to patient's size, iterative reconstruction. CT HEAD: Findings: The mixed attenuation subdural collection is seen along the right lateral high convexity which demonstrates hyperdense components. The appearance is most concerning for an acute on chronic subdural hematoma. This measures up to 13 mm in width. Mild mass effect is seen with right to left midline shift measuring 6 mm at this time. .  The ventricles are normal in size and configuration.   No evidence of midline shift or obvious mass effect is seen. No abnormal edema pattern is seen in a vascular distribution to suggest large artery infarction. Evaluation with bone windows shows no acute osseous abnormality of the bony calvarium. No abnormal fluid collections are seen associated with the aerated sinuses. CT FACIAL BONES :Multiple fractures are suggested of the left maxillary sinus. This is most clearly demonstrated in the posterior wall of the maxillary sinus although does appear to extend through the left orbital floor seen on axial image 48. No abnormal depression of the orbital floor is currently seen. Some component of fracture line appears to extend to the most anterior aspect of the left zygoma seen on axial image 46. Inferior fracture line extends to the root of the left second maxillary premolar. Fluid is seen within the left axillary sinus which may represent secondary fluid. No acute facial bone fractures otherwise seen. CT CERVICAL SPINE: The skull base is grossly intact. No acute fracture line of the cervical spine is directly visualized. Only chronic degenerative changes are seen with moderate to advanced degenerative changes throughout the cervical spine involving both disc levels and posterior facets. Mild alignment abnormality is are seen without acute findings of posterior elements likely related to facet hypertrophic degenerative changes. .  The dens is intact, and the pre dens space is normal. Prevertebral soft tissues are normal. Limited evaluation of the lung apices shows no gross abnormalities. 1.  Acute on chronic right subdural hematoma with mild right to left midline shift measuring 6 mm at the septum pellucidum. 2. Multiple fractures the left maxillary sinus to include a nondisplaced fracture of the left orbital floor as described above. 3. No acute osseous abnormality of the cervical spine. Only relatively advanced chronic degenerative changes are seen. This report was made using voice transcription. Despite my best efforts to avoid any, transcription errors may persist. If there is any question about the accuracy of the report or need for clarification, then please call [phone], or text me through [email or web address] for clarification or correction. The Critical findings acute intracranial hemorrhage was discussed with Dr. Feliberto Rodney, by myself personally at 7:40 PM on 11/14/2021. CT SPINE CERV WO CONT    Result Date: 11/14/2021  CT HEAD, AND CERVICAL SPINE WITHOUT CONTRAST, 11/14/2021. History: Fall 8 days ago. Comparison: None. Technique:   5 mm axial scans from the skull base to the vertex of the head, axial 2.5 mm scans from above the orbits through the mandible with coronal reconstructed images were also performed of the face, and 1.25 mm axial scans from the skull base into the upper chest performed, and sagittal and coronal reconstructed images performed of the cervical spine. All CT scans performed at this facility use one or all of the following: Automated exposure control, adjustment of the mA and/or kVp according to patient's size, iterative reconstruction. CT HEAD: Findings: The mixed attenuation subdural collection is seen along the right lateral high convexity which demonstrates hyperdense components. The appearance is most concerning for an acute on chronic subdural hematoma. This measures up to 13 mm in width. Mild mass effect is seen with right to left midline shift measuring 6 mm at this time. .  The ventricles are normal in size and configuration. No evidence of midline shift or obvious mass effect is seen. No abnormal edema pattern is seen in a vascular distribution to suggest large artery infarction. Evaluation with bone windows shows no acute osseous abnormality of the bony calvarium. No abnormal fluid collections are seen associated with the aerated sinuses. CT FACIAL BONES :Multiple fractures are suggested of the left maxillary sinus.  This is most clearly demonstrated in the posterior wall of the maxillary sinus although does appear to extend through the left orbital floor seen on axial image 48. No abnormal depression of the orbital floor is currently seen. Some component of fracture line appears to extend to the most anterior aspect of the left zygoma seen on axial image 46. Inferior fracture line extends to the root of the left second maxillary premolar. Fluid is seen within the left axillary sinus which may represent secondary fluid. No acute facial bone fractures otherwise seen. CT CERVICAL SPINE: The skull base is grossly intact. No acute fracture line of the cervical spine is directly visualized. Only chronic degenerative changes are seen with moderate to advanced degenerative changes throughout the cervical spine involving both disc levels and posterior facets. Mild alignment abnormality is are seen without acute findings of posterior elements likely related to facet hypertrophic degenerative changes. .  The dens is intact, and the pre dens space is normal. Prevertebral soft tissues are normal. Limited evaluation of the lung apices shows no gross abnormalities. 1.  Acute on chronic right subdural hematoma with mild right to left midline shift measuring 6 mm at the septum pellucidum. 2. Multiple fractures the left maxillary sinus to include a nondisplaced fracture of the left orbital floor as described above. 3. No acute osseous abnormality of the cervical spine. Only relatively advanced chronic degenerative changes are seen. This report was made using voice transcription. Despite my best efforts to avoid any, transcription errors may persist. If there is any question about the accuracy of the report or need for clarification, then please call [phone], or text me through [email or web address] for clarification or correction. The Critical findings acute intracranial hemorrhage was discussed with Dr. Hanna Henry, by myself personally at 7:40 PM on 11/14/2021.     CT ABD PELV W CONT    Result Date: 11/14/2021  CT ABDOMEN AND PELVIS WITH INTRAVENOUS CONTRAST DATED 11/14/2021. History: Fall 8 days ago. Worsening abdominal pain. Comparison: None. Technique:   Multiple contiguous helical CT images reconstructed at 5 mm intervals were obtained from above the diaphragms through the ischial tuberosities following oral and 100 cc [id number] without acute complication. All CT scans performed at this facility use one or all of the following: Automated exposure control, adjustment of the mA and/or kVp according to patient's size, iterative reconstruction. Findings: CT ABDOMEN:  Limited evaluation of the lung bases and base of the mediastinum demonstrates no significant abnormalities. The Liver appears fatty infiltrated although homogeneous. Pneumobilia seen in the left lobe. The spleen is homogeneous in attenuation. No contour deforming or enhancing mass lesions are seen of the pancreas or adrenal glands. The gallbladder has been removed. The kidneys enhance symmetrically and no evidence of hydronephrosis is seen. Changes are seen in the proximal stomach which may be related to a prior process such as fundoplication. The visualized loops of small bowel and colon are normal in caliber. The appendix is not clearly seen. No acute inflammatory structures suggested adjacent to the cecal tip. Moderate diverticulosis is seen of the sigmoid colon chest or has well without etiology May be has left-sided sinusitis. 40) symptomatic O that was one follicle in the. No free fluid, free air, or focal inflammatory changes are seen in the abdomen. No adenopathy is seen. The abdominal aorta demonstrates mild atherosclerotic calcification. No retroperitoneal hematoma is seen. . No acute osseous abnormality seen of the lumbar spine. CT PELVIS: No abnormal pelvic fluid collections or inflammatory changes are present. No pelvic adenopathy is seen. The urinary bladder is unremarkable.  No acute osseous abnormality is seen of the bony pelvis. 1.  No significant acute findings in the abdomen or pelvis. This report was made using voice transcription. Despite my best efforts to avoid any, transcription errors may persist. If there is any question about the accuracy of the report or need for clarification, then please call [phone], or text me through [email or web address] for clarification or correction.        Medications Administered     alum-mag hydroxide-simeth (MYLANTA) oral suspension 30 mL     Admin Date  11/14/2021 Action  Given Dose  30 mL Route  Oral Administered By  Spring Navarro RN          famotidine (PF) (PEPCID) 20 mg in 0.9% sodium chloride 10 mL injection     Admin Date  11/14/2021 Action  Given Dose  20 mg Route  IntraVENous Administered By  Spring Navarro RN          iopamidoL (ISOVUE-370) 76 % injection 100 mL     Admin Date  11/14/2021 Action  Given Dose  100 mL Route  IntraVENous Administered By  Helen VERDUGO          lidocaine (XYLOCAINE) 2 % viscous solution 15 mL     Admin Date  11/14/2021 Action  Given Dose  15 mL Route  Mouth/Throat Administered By  Spring Navarro RN          morphine 10 mg/ml injection 6 mg     Admin Date  11/14/2021 Action  Given Dose  6 mg Route  IntraVENous Administered By  Poly Ramires RN          ondansetron [facility]) injection 8 mg     Admin Date  11/14/2021 Action  Given Dose  8 mg Route  IntraVENous Administered By  Poly Ramires RN          saline peripheral flush soln 10 mL     Admin Date  11/14/2021 Action  Given Dose  10 mL Route  InterCATHeter Administered By  Helen VERDUGO          sodium chloride 0.9 % bolus infusion 1,000 mL     Admin Date  11/14/2021 Action  New Bag Dose  1,000 mL Route  IntraVENous Administered By  Marek Mauro RN          sodium chloride 0.9 % bolus infusion 100 mL     Admin Date  11/14/2021 Action  New Bag Dose  100 mL Route  IntraVENous Administered By  Ardeth Paget                Signed:  Raquel Syed DO    Part of this note may have been written by using a voice dictation software. The note has been proof read but may still contain some grammatical/other typographical errors.

## 2021-11-15 NOTE — H&P (VIEW-ONLY)
[facility] Consult Note       Primary GI Physician: Dr Alex Sheikh    Referring Provider:  Dr Aby Pena Date:  11/15/2021    Admit Date:  11/14/2021    Chief Complaint:  ABS Pain    Subjective:     History of Present Illness:  Patient is a 78 y.o. female with PMH including but not limited to SOD dysfunction, chronic pancreatitis, conversion disorder, nephrolithiasis, GERD, PUD, TIA, IBS , who is seen in consultation at the request of Dr. Camila Moreau for ABD pain. She was admitted 11/14 with recurrent nausea/ abd pain/ diarrhea with recent fall. During her fall, she sit her face and knocked out 2 left sided front teeth. Has not seen dental to date. CT head shows acute on chronic right SDH with mild right to left midline shift of 6 mm as well as left maxillary sinus fractures and a nondisplaced left orbital floor fracture. Neurosurgery has consulted and no acute surgical needs noted. ENT consult pending. She reports RUQ to epigastric pain since her ERCP continuous with dry heaves and nausea. She denies melena, hematemesis or coffee ground emesis. She had a fall at home 2 weeks ago and reports taking Ibuprofen since that time for facial pain. She is not on a PPI but takes Carafate. She also takes Colestid at home prn. She reports eating very little and losing 33 pounds over the last yesr. She denies significant diarrhea now. CTAP 11/14 no acute findings. Labs reviewed with hgb 11.8, WBC 6.5, LFT's wnl, Lipase 80    She was last seen in our office 8/3/21 with flare of pancreatitis with abd pain N/V. She was taking phenergan 25 mg prn and colestipol before meals. She underwent ERCP 9/1/21 with antral erosions and pancreatic stent removal, CBD was normal.  Pancreatic stent was placed 12/2020.   Last colonoscopy 10/25/16 with diverticulosis and IH.       CT A/P 11/14/21  Findings:  CT ABDOMEN:    Limited evaluation of the lung bases and base of the mediastinum demonstrates no  significant abnormalities. The Liver appears fatty infiltrated although homogeneous. Pneumobilia seen in  the left lobe. The spleen is homogeneous in attenuation. No contour deforming  or enhancing mass lesions are seen of the pancreas or adrenal glands. The  gallbladder has been removed. The kidneys enhance symmetrically and no evidence  of hydronephrosis is seen. Changes are seen in the proximal stomach which may be related to a prior process  such as fundoplication. The visualized loops of small bowel and colon are normal  in caliber. The appendix is not clearly seen. No acute inflammatory structures  suggested adjacent to the cecal tip. Moderate diverticulosis is seen of the  sigmoid colon chest or has well without etiology May be has left-sided  sinusitis. 40) symptomatic O that was one follicle in the. No free fluid, free  air, or focal inflammatory changes are seen in the abdomen. No adenopathy is  seen. The abdominal aorta demonstrates mild atherosclerotic calcification. No  retroperitoneal hematoma is seen. . No acute osseous abnormality seen of the  lumbar spine. CT PELVIS:  No abnormal pelvic fluid collections or inflammatory changes are present. No  pelvic adenopathy is seen. The urinary bladder is unremarkable. No acute  osseous abnormality is seen of the bony pelvis. IMPRESSION  1. No significant acute findings in the abdomen or pelvis. ERCP Dr Chemo Keller  9/1/2021  PROCEDURE: After informed consent, the patient was placed under anesthesia in the semi-prone position. The duodenoscope was passed without difficulty to the area of the ampulla. A standard cannulation and ERCP was performed with the findings as outlined and described below. Patient tolerated the procedure well. ASSESSMENT:  1. Pancreatic stent removed  2. Pancreas- swept with 12 mm balloon- no stones; minor stricture at junction of head and body  3. GB absent  4. CBD normal  5.  Antral erosions  PLAN:  1. F/u 3 months  2. carafate       PMH:  Past Medical History:   Diagnosis Date    Abdominal pain, generalized     Arthritis     left thumb    Backache     Cerebrovascular disease     Diarrhea     Encounter for long-term (current) drug use 11/13/2015    Endometriosis     Esophageal stricture     stretched in 05/2012    Esophageal ulcer     Fatigue     Gastric ulcer 9/2/2011    Gastric ulcer 8/11/2016    1. EGD May 2015 with several ulcers.  GERD (gastroesophageal reflux disease)     no treatment at this time.      Hearing loss     Herpes zoster     Hiatal hernia--s/p laparoscopoic nissen  7/18/2012    History of CVA (cerebrovascular accident) Dx: 2014    no deficits    History of DVT of lower extremity 9/30/2013    RLE     History of seizure     once/ 2 days after surgery    Hormone replacement therapy     Hormone Pellets    Hx of deep vein thrombophlebitis of lower extremity     total 3. 2 blood clots in 1975 and 1 blood clot in 1981 to Right knee area    Hypertension     managed with meds    Hypokalemia 9/2/2011    Hypothyroid     managed with meds    IBS (irritable bowel syndrome)     diet controlled     Itchy scalp     Long term (current) use of aspirin     Migraine     Nausea with vomiting     Neoplasm of uncertain behavior of skin     Personal history of kidney stones     passed on her own    Pulmonary nodule     Shoulder pain     Skin tag     neck, #25 excised    Sphincter of Oddi dysfunction 2017    Stroke ([address]     Syncope     URI (upper respiratory infection)     Vitamin D deficiency 11/13/2015       PSH:  Past Surgical History:   Procedure Laterality Date    COLONOSCOPY      EGD  2011    Dilation    ERCP  1996, 2011    Papillotomy    ERCP  12/9/2020         ERCP  9/1/2021         HX APPENDECTOMY      HX BREAST LUMPECTOMY      HX ENDOSCOPY  01/12/2018    stent removal    HX ERCP  2017    pancreatic stent    HX GI      Nissen fundiplication    HX LAP CHOLECYSTECTOMY      HX BALJINDER AND BSO      HX TONSILLECTOMY  childhood       Allergies: Allergies   Allergen Reactions    Benadryl [Diphenhydramine Hcl] Anxiety    Codeine Itching    Compazine [Prochlorperazine Edisylate] Other (comments)     Left face drawing    Nubain [Nalbuphine] Anxiety    Stadol [Butorphanol Tartrate] Anxiety    Talwin [Pentazocine Lactate] Anxiety    Trilafon Anxiety    Ultram [Tramadol] Unknown (comments)     Pt denies       Home Medications:  Prior to Admission medications    Medication Sig Start Date End Date Taking? Authorizing Provider   amLODIPine (Norvasc) 5 mg tablet Take 5 mg by mouth every evening. Yes Provider, Historical   losartan-hydroCHLOROthiazide (Hyzaar) 100-25 mg per tablet Take 1 Tablet by mouth nightly. Yes Provider, Historical   Cholestyramine-Aspartame (Cholestyramine Light) 4 gram powder MIX 1 SCOOP WITH WATER OR JUICE AND TAKE ORALLY TWICE A DAY FOR CHRONIC DIARRHEA 4/1/21  Yes Gilberto Byers MD   ketoconazole (NIZORAL) 2 % shampoo SHAMPOO THREE TIMES A WEEK EVERY 4 WEEKS. 3/15/21  Yes Gilberto Byers MD   Thyroid, Pork, ([place] Thyroid) 180 mg tab Take 180 mg by mouth daily. Yes Provider, Historical   clobetasoL (TEMOVATE) 0.05 % external solution Apply 1 Dose to affected area two (2) times a day. 6/24/20  Yes Gilberto Byers MD   carvediloL (COREG) 25 mg tablet Take 1 Tab by mouth two (2) times daily (with meals). 5/29/20  Yes Adalberto Deluca MD   promethazine (PHENERGAN) 50 mg tablet Take 1 Tab by mouth every six (6) hours as needed for Nausea. 8/23/19  Yes Gilberto Byers MD   magnesium oxide (MAG-OX) 400 mg tablet Take 400 mg by mouth two (2) times a day. Yes Provider, Historical   PRASTERONE, DHEA, (DHEA PO) Take 100 mg by mouth daily. Yes Provider, Historical   aspirin (ASPIRIN) 325 mg tablet Take 325 mg by mouth every morning.    Yes Provider, Historical   cyanocobalamin (VITAMIN B-12) 500 mcg tablet Take 500 mcg by mouth every morning. Yes Provider, Historical   cholecalciferol, vitamin D3, (VITAMIN D3) 2,000 unit Tab Take 1 Tab by mouth every morning.      Yes Provider, Historical       Hospital Medications:  Current Facility-Administered Medications   Medication Dose Route Frequency    amLODIPine (NORVASC) tablet 5 mg  5 mg Oral QPM    carvediloL (COREG) tablet 25 mg  25 mg Oral BID WITH MEALS    cholecalciferol (VITAMIN D3) (1000 Units /25 mcg) tablet 2,000 Units  2,000 Units Oral DAILY    cholestyramine-aspartame (QUESTRAN LIGHT) packet 4 g  4 g Oral BID WITH MEALS    cyanocobalamin tablet 500 mcg  500 mcg Oral 7am    losartan (COZAAR) tablet 100 mg  100 mg Oral DAILY    magnesium oxide (MAG-OX) tablet 400 mg  400 mg Oral BID    Thyroid (Pork) tab 180 mg- patient supplied (Patient Supplied)  180 mg Oral DAILY    sodium chloride (NS) flush 5-40 mL  5-40 mL IntraVENous Q8H    sodium chloride (NS) flush 5-40 mL  5-40 mL IntraVENous PRN    acetaminophen (TYLENOL) tablet 650 mg  650 mg Oral Q6H PRN    Or    acetaminophen (TYLENOL) suppository 650 mg  650 mg Rectal Q6H PRN    polyethylene glycol (MIRALAX) packet 17 g  17 g Oral DAILY PRN    tuberculin injection 5 Units  5 Units IntraDERMal ONCE    sucralfate (CARAFATE) tablet 1 g  1 g Oral AC&HS    hydrALAZINE (APRESOLINE) 20 mg/mL injection 20 mg  20 mg IntraVENous Q6H PRN    hydroCHLOROthiazide (HYDRODIURIL) tablet 25 mg  25 mg Oral DAILY    pantoprazole (PROTONIX) 40 mg in 0.9% sodium chloride 10 mL injection  40 mg IntraVENous DAILY    dextrose 40% (GLUTOSE) oral gel 1 Tube  15 g Oral PRN    glucagon (GLUCAGEN) injection 1 mg  1 mg IntraMUSCular PRN    dextrose (D50W) injection syrg 12.5-25 g  25-50 mL IntraVENous PRN    dextrose 5 % - 0.45% NaCl infusion  75 mL/hr IntraVENous CONTINUOUS    sodium chloride (NS) flush 5-10 mL  5-10 mL IntraVENous Q8H    sodium chloride (NS) flush 5-10 mL  5-10 mL IntraVENous PRN    morphine injection 1 mg  1 mg IntraVENous Q4H PRN    ondansetron (ZOFRAN) injection 4 mg  4 mg IntraVENous Q4H PRN       Social History:  Social History     Tobacco Use    Smoking status: Never Smoker    Smokeless tobacco: Never Used   Substance Use Topics    Alcohol use: Yes     Alcohol/week: 3.0 standard drinks     Types: 3 Glasses of wine per week       Pt denies any history of drug use, blood transfusions, or tattoos. Family History:  Family History   Problem Relation Age of Onset    Heart Disease Mother     Heart Disease Father     Hypertension Brother     Hypertension Brother     Diabetes Brother     Cancer Other         aunt, bone ca    Cancer Other         3 aunts with breast ca    Cancer Maternal Aunt        Review of Systems:  A detailed 10 system ROS is obtained, with pertinent positives as listed above. All others are negative. Diet:  Regular    Objective:     Physical Exam:  Vitals:  Visit Vitals  /64   Pulse 61   Temp 97.9 °F (36.6 °C)   Resp 17   Ht 5' 2.5\" (1.588 m)   Wt 57.6 kg (126 lb 15.8 oz)   SpO2 98%   Breastfeeding No   BMI 22.86 kg/m²     Gen:  Pt is alert, cooperative, no acute distress  Skin:  Extremities and face reveal no rashes. HEENT: Sclerae anicteric. Extra-occular muscles are intact. No oral ulcers. No abnormal pigmentation of the lips. The neck is supple. Cardiovascular: Regular rate and rhythm. No murmurs, gallops, or rubs. Respiratory:  Comfortable breathing with no accessory muscle use. Clear breath sounds anteriorly with no wheezes, rales, or rhonchi. GI:  Abdomen nondistended, soft, TTP RUQ/epigastrium. Normal active bowel sounds. No enlargement of the liver or spleen. No masses palpable. Rectal:  Deferred  Musculoskeletal:  No pitting edema of the lower legs. Neurological:  Gross memory appears intact. Patient is alert and oriented. Psychiatric:  Mood appears appropriate with judgement intact. Lymphatic:  No cervical or supraclavicular adenopathy.     Laboratory:    Recent Labs 11/15/21  0321 11/14/21  1749   WBC 6.5 6.2   HGB 11.8 13.5   HCT 36.8 39.5    286   MCV 94.8 92.9    132*   K 4.5 4.4    99   CO2 21 22   BUN 5* 6*   CREA 0.66 0.79   CA 7.3* 8.5   MG  --  1.9   GLU 52* 70   AP 91 109   AST 25 27   ALT 14 21   TBILI 0.5 0.5   ALB 2.8* 3.3   TP 6.5 7.5   LPSE  --  80          Assessment:     Active Problems:    Hypothyroidism (9/30/2013)      GERD (gastroesophageal reflux disease) (9/30/2013)      Overview: S/p Nissen Fundoplication      Essential hypertension (9/30/2013)      Overview: 1.  Echo (5/20/14) : Normal LV systolic function. EF 55-60%. Normal       diastolic function. RVSP 40-45. Mild to moderate tricuspid       regurgitation. Mild mitral regurgitation. 2.  Renal artery US (7/8/14): No evidence of renal artery stenosis. 3.  Plasma Metanephrine. (6/25/14): Normal      4. Echo (11/14):  EF 04-97%  Grade 1 diastolic dysfunction. Mild mitral       regurgitaiton. Dyslipidemia (5/20/2014)      Peptic ulcer disease (8/11/2016)      Overview: 1. EGD May 2015 with several ulcers. Subdural hematoma (HCC) (11/14/2021)      Abdominal pain (11/14/2021)      Chronic diarrhea (11/15/2021)      Orbital floor fracture ([address] (11/15/2021)      Maxillary sinus fracture ([address] (11/15/2021)      78 y.o. female with PMH including but not limited to SOD dysfunction, chronic pancreatitis, conversion disorder, nephrolithiasis, GERD, PUD, TIA, IBS admitted with  recurrent nausea/ abd pain/ diarrhea with recent fall. CT head shows acute on chronic right SDH with mild right to left midline shift of 6 mm as well as left maxillary sinus fractures and a nondisplaced left orbital floor fracture. Neurosurgery has consulted and no acute surgical needs noted. ENT consult pending. She reports RUQ to epigastric pain since her ERCP continuous with dry heaves and nausea. Reports taking Ibuprofen since her fall for facial pain.  She reports eating very little and losing 33 pounds over the last yesr. She denies significant diarrhea now. CTAP 11/14 no acute findings. Labs reviewed with hgb 11.8, WBC 6.5, LFT's wnl, Lipase 80    Plan:     - PPI BID  - NPO after MN for EGD tomorrow to evaluate for PUD  - Supportive care with antiemetics     Charlotte Manual, NP  Patient is seen and examined in collaboration with Dr. Mary Tello. Assessment and plan as per Dr. Addy Lucas.

## 2021-11-15 NOTE — PROGRESS NOTES
Initial visit was made, prayer, emotional support and a spiritual presence were provided. She experienced a fall at home.     Lana Wasserman, [address] [facility], [facility]   None

## 2021-11-15 NOTE — PROGRESS NOTES
Bedside and verbal shift change report received from  JOLANTA Galicia (offgoing nurse). Report included the following information SBAR, Kardex, ED Summary, OR Summary, Procedure Summary, Intake/Output, MAR, Accordion, Recent Results, Med Rec Status and Alarm Parameters .      Dual skin assessment completed at bedside: bruising left cheek, missing teeth (list pertinent skin assessment findings)    Dual verification of gtts completed (name of gtts verified): N/A

## 2021-11-15 NOTE — PROGRESS NOTES
CT head with chronic SDH on the right convexity with a small acute component. The is mild mass effect and midline shift. Pt with GCS 15 by reoort. Agree with admission. Will follow.  Full conult in am.

## 2021-11-15 NOTE — CONSULTS
NEUROSURGERY CONSULT NOTE:     CC: acute on chronic right subdural hemorrhage      Assessment and Plan:   -hold all antiplatelets and anticoagulants  -Elevate head of bed to 30 degrees  -repeat head CT in 12 hours  -keppra 500mg BID for seizure precaution    -no emergent neurosurgical intervention needed at this time    HPI:   Ashley Sev[age] y.o. female with a PMH of blood clots, migraines, GERD, hypothyroidism, and CVA who came to the ER yesterday to be evaluated for her epigastric pain. Pt states that about 2 weeks ago she lost her balance and fell and hit her head on a bar stool in her kitchen. Pt states she did not seek any medical attention after the fall and is unsure if she had any LOC. Pt states she has been taking aspirin 325mg daily for her hx of blood clots. Pt states since the fall she has been having lots of headaches but denies any vision troubles, or worsening nausea or vomiting. Past Medical History:   Diagnosis Date    Abdominal pain, generalized     Arthritis     left thumb    Backache     Cerebrovascular disease     Diarrhea     Encounter for long-term (current) drug use 11/13/2015    Endometriosis     Esophageal stricture     stretched in 05/2012    Esophageal ulcer     Fatigue     Gastric ulcer 9/2/2011    Gastric ulcer 8/11/2016    1. EGD May 2015 with several ulcers.  GERD (gastroesophageal reflux disease)     no treatment at this time.      Hearing loss     Herpes zoster     Hiatal hernia--s/p laparoscopoic nissen  7/18/2012    History of CVA (cerebrovascular accident) Dx: 2014    no deficits    History of DVT of lower extremity 9/30/2013    RLE     History of seizure     once/ 2 days after surgery    Hormone replacement therapy     Hormone Pellets    Hx of deep vein thrombophlebitis of lower extremity     total 3. 2 blood clots in 1975 and 1 blood clot in 1981 to Right knee area    Hypertension     managed with meds    Hypokalemia 9/2/2011    Hypothyroid managed with meds    IBS (irritable bowel syndrome)     diet controlled     Itchy scalp     Long term (current) use of aspirin     Migraine     Nausea with vomiting     Neoplasm of uncertain behavior of skin     Personal history of kidney stones     passed on her own    Pulmonary nodule     Shoulder pain     Skin tag     neck, #25 excised    Sphincter of Oddi dysfunction 2017    Stroke ([address]     Syncope     URI (upper respiratory infection)     Vitamin D deficiency 11/13/2015     Past Surgical History:   Procedure Laterality Date    COLONOSCOPY      EGD  2011    Dilation    ERCP  1996, 2011    Papillotomy    ERCP  12/9/2020         ERCP  9/1/2021         HX APPENDECTOMY      HX BREAST LUMPECTOMY      HX ENDOSCOPY  01/12/2018    stent removal    HX ERCP  2017    pancreatic stent    HX GI      Nissen fundiplication    HX LAP CHOLECYSTECTOMY      HX BALJINDER AND BSO      HX TONSILLECTOMY  childhood     Allergies   Allergen Reactions    Benadryl [Diphenhydramine Hcl] Anxiety    Codeine Itching    Compazine [Prochlorperazine Edisylate] Other (comments)     Left face drawing    Nubain [Nalbuphine] Anxiety    Stadol [Butorphanol Tartrate] Anxiety    Talwin [Pentazocine Lactate] Anxiety    Trilafon Anxiety    Ultram [Tramadol] Unknown (comments)     Pt denies     Social History     Socioeconomic History    Marital status:      Spouse name: Not on file    Number of children: Not on file    Years of education: Not on file    Highest education level: Not on file   Occupational History    Not on file   Tobacco Use    Smoking status: Never Smoker    Smokeless tobacco: Never Used   Vaping Use    Vaping Use: Never used   Substance and Sexual Activity    Alcohol use:  Yes     Alcohol/week: 3.0 standard drinks     Types: 3 Glasses of wine per week    Drug use: No    Sexual activity: Not on file   Other Topics Concern    Not on file   Social History Narrative    Not on file Social Determinants of Health     Financial Resource Strain:     Difficulty of Paying Living Expenses: Not on file   Food Insecurity:     Worried About Running Out of Food in the Last Year: Not on file    Yosvany of Food in the Last Year: Not on file   Transportation Needs:     Lack of Transportation (Medical): Not on file    Lack of Transportation (Non-Medical): Not on file   Physical Activity:     Days of Exercise per Week: Not on file    Minutes of Exercise per Session: Not on file   Stress:     Feeling of Stress : Not on file   Social Connections:     Frequency of Communication with Friends and Family: Not on file    Frequency of Social Gatherings with Friends and Family: Not on file    Attends [employer] Services: Not on file    Active Member of [address] [place] [address] [employer] or Organizations: Not on file    Attends Club or Organization Meetings: Not on file    Marital Status: Not on file   Intimate Partner Violence:     Fear of Current or Ex-Partner: Not on file    Emotionally Abused: Not on file    Physically Abused: Not on file    Sexually Abused: Not on file   Housing Stability:     Unable to Pay for Housing in the Last Year: Not on file    Number of [place] in the Last Year: Not on file    Unstable Housing in the Last Year: Not on file         Physical Exam:   Visit Vitals  /78   Pulse 79   Temp 97.9 °F (36.6 °C)   Resp 20   Ht 5' 2.5\" (1.588 m)   Wt 126 lb 15.8 oz (57.6 kg)   SpO2 97%   Breastfeeding No   BMI 22.86 kg/m²             ROS Review of Systems    Constitutional:                    No recent weight changes, fever, fatigue, sleep difficulties, loss of appetite   ENT/Mouth:  POShearing loss, POSringing in the ears, chronic sinus problem, nose bleeds,sore throat, voice change, hoarseness, swollen glands in neck, or difficulties with chewing and swallowing. Cardiovascular:  No chest pain/angina pectoris, palpitations, swelling of feet/ankles/hands, or calf pain while walking. Respiratory: No chronic or frequent coughs, spitting up blood, shortness of breath, POSasthma, or wheezing.      Gastrointestinal: No a bdominal pain, heartburn, nausea, vomiting, constipation, or frequent diarrhea     Genitourinary: POS frequent urination, burning or painful urination, or blood in urine     Musculoskeletal:   POS joint pain, stiffness/swelling, POS weakness of muscles, or POSmuscle pain/cramp     Integument:   Right sided facial bruising and right thigh bruising     Neurological:   positive for headaches       Neuro Assessment:    Right handed    LASHAE  Alert and Oriented x4   GCS15    Speech normal  Reflexes 2+ equal and bilateral  Finger to nose normal  Cranial nerves I-VII grossly intact  Sensation equal bilaterally  Pronator Drift negative  Babinski negative  Bowel and bladder control intact  Upper extremities: shoulders 5/5 bilateral, biceps 5/5 bilateral, triceps 5/5, hand intrinsics 5/5 bilateral  Lower extremities: HF 5/5 bilateral, knees 5/5 bilateral, hamsting 5/5 bilateral, DF 5/5 bilateral, PF 5/5 bilateral    Yuan Free, NP   I spent  30  Minutes total with this  Patient both  In person  And  Reviewing ct scans  And records  Reginald Hair MD

## 2021-11-15 NOTE — ED PROVIDER NOTES
Onesimo Benson is a 78 y.o. female seen on 11/14/2021 in the [facility] EMERGENCY DEPT in room ER04/04. Chief Complaint   Patient presents with    Abdominal Pain     HPI: 79-year-old  female presented emergency department with complaints of epigastric pain for the past 2 weeks. Patient with history of stricture of the singular Vertie. Patient had her stent removed on 9/1/2021. Patient states that she was doing well but then started having pain 2 weeks ago. It is point tender in the middle of her abdomen. It is not worsened or improved by anything. It is always there but there are times of intensification. Patient has not been able to sleep secondary to pain. Patient also states that she fell 8 days ago while in the kitchen falling directly onto her face. Patient is on aspirin but no other blood thinners. Patient did not lose consciousness. Patient did not go to the doctor or emergency department to be evaluated after her fall. Patient with significant bruising to her face over her forehead, around both eyes extending to the lower maxilla. Patient also had 2 teeth knocked out at that time. Patient states that she did not come to be evaluated because she \"looks scary\" and did not want anybody to see her. She has not had any vomiting, nausea, chest pain, shortness of breath, changes in bowel or bladder habits or other concerns. Historian: Patient/family    REVIEW OF SYSTEMS     Review of Systems   Constitutional: Negative. HENT: Positive for dental problem and facial swelling. Eyes: Negative. Respiratory: Negative. Cardiovascular: Negative. Gastrointestinal: Positive for abdominal pain. Negative for nausea and vomiting. Genitourinary: Negative. Musculoskeletal: Negative for neck pain. Skin: Negative. Neurological: Negative. Psychiatric/Behavioral: Positive for confusion.    All other systems reviewed and are negative. PAST MEDICAL HISTORY     Past Medical History:   Diagnosis Date    Abdominal pain, generalized     Arthritis     left thumb    Backache     Cerebrovascular disease     Diarrhea     Encounter for long-term (current) drug use 11/13/2015    Endometriosis     Esophageal stricture     stretched in 05/2012    Esophageal ulcer     Fatigue     Gastric ulcer 9/2/2011    Gastric ulcer 8/11/2016    1. EGD May 2015 with several ulcers.  GERD (gastroesophageal reflux disease)     no treatment at this time.      Hearing loss     Herpes zoster     Hiatal hernia--s/p laparoscopoic nissen  7/18/2012    History of CVA (cerebrovascular accident) Dx: 2014    no deficits    History of DVT of lower extremity 9/30/2013    RLE     History of seizure     once/ 2 days after surgery    Hormone replacement therapy     Hormone Pellets    Hx of deep vein thrombophlebitis of lower extremity     total 3. 2 blood clots in 1975 and 1 blood clot in 1981 to Right knee area    Hypertension     managed with meds    Hypokalemia 9/2/2011    Hypothyroid     managed with meds    IBS (irritable bowel syndrome)     diet controlled     Itchy scalp     Long term (current) use of aspirin     Migraine     Nausea with vomiting     Neoplasm of uncertain behavior of skin     Personal history of kidney stones     passed on her own    Pulmonary nodule     Shoulder pain     Skin tag     neck, #25 excised    Sphincter of Oddi dysfunction 2017    Stroke ([facility] [address]     Syncope     URI (upper respiratory infection)     Vitamin D deficiency 11/13/2015     Past Surgical History:   Procedure Laterality Date    COLONOSCOPY      EGD  2011    Dilation    ERCP  1996, 2011    Papillotomy    ERCP  12/9/2020         ERCP  9/1/2021         HX APPENDECTOMY      HX BREAST LUMPECTOMY      HX ENDOSCOPY  01/12/2018    stent removal    HX ERCP  2017    pancreatic stent    HX GI      Nissen fundiplication    HX LAP CHOLECYSTECTOMY      HX BALJINDER AND BSO      HX TONSILLECTOMY  childhood     Social History     Socioeconomic History    Marital status:    Tobacco Use    Smoking status: Never Smoker    Smokeless tobacco: Never Used   Vaping Use    Vaping Use: Never used   Substance and Sexual Activity    Alcohol use: Yes     Alcohol/week: 3.0 standard drinks     Types: 3 Glasses of wine per week    Drug use: No     Prior to Admission Medications   Prescriptions Last Dose Informant Patient Reported? Taking? Cholestyramine-Aspartame (Cholestyramine Light) 4 gram powder   No No   Sig: MIX 1 SCOOP WITH WATER OR JUICE AND TAKE ORALLY TWICE A DAY FOR CHRONIC DIARRHEA   PRASTERONE, DHEA, (DHEA PO)   Yes No   Sig: Take 100 mg by mouth daily. Thyroid, Pork, ([place] Thyroid) 180 mg tab   Yes No   Sig: Take 180 mg by mouth daily. amLODIPine (Norvasc) 5 mg tablet   Yes No   Sig: Take 5 mg by mouth every evening. aspirin (ASPIRIN) 325 mg tablet   Yes No   Sig: Take 325 mg by mouth every morning. carvediloL (COREG) 25 mg tablet   No No   Sig: Take 1 Tab by mouth two (2) times daily (with meals). cholecalciferol, vitamin D3, (VITAMIN D3) 2,000 unit Tab   Yes No   Sig: Take 1 Tab by mouth every morning. clobetasoL (TEMOVATE) 0.05 % external solution   No No   Sig: Apply 1 Dose to affected area two (2) times a day. cyanocobalamin (VITAMIN B-12) 500 mcg tablet   Yes No   Sig: Take 500 mcg by mouth every morning.     ketoconazole (NIZORAL) 2 % shampoo   No No   Sig: SHAMPOO THREE TIMES A WEEK EVERY 4 WEEKS.   losartan-hydroCHLOROthiazide (Hyzaar) 100-25 mg per tablet   Yes No   Sig: Take 1 Tablet by mouth nightly.   magnesium oxide (MAG-OX) 400 mg tablet   Yes No   Sig: Take 400 mg by mouth two (2) times a day. promethazine (PHENERGAN) 50 mg tablet   No No   Sig: Take 1 Tab by mouth every six (6) hours as needed for Nausea.       Facility-Administered Medications: None     Allergies   Allergen Reactions    Benadryl [Diphenhydramine Hcl] Anxiety    Codeine Itching    Compazine [Prochlorperazine Edisylate] Other (comments)     Left face drawing    Nubain [Nalbuphine] Anxiety    Stadol [Butorphanol Tartrate] Anxiety    Talwin [Pentazocine Lactate] Anxiety    Trilafon Anxiety    Ultram [Tramadol] Unknown (comments)     Pt denies        PHYSICAL EXAM       Vitals:    11/14/21 1733 11/14/21 1749 11/14/21 1841   BP:  (!) 143/117    Pulse: 85     Resp: 18     Temp: 98.3 °F (36.8 °C)     SpO2: 95%  95%    Vital signs were reviewed. Physical Exam  Vitals and nursing note reviewed. Constitutional:       General: She is in acute distress (Obvious discomfort). Appearance: She is well-developed. HENT:      Head: Normocephalic. Raccoon eyes present. Jaw: There is normal jaw occlusion. Comments: Diffuse ecchymosis and stages of healing the forehead periorbital regions bilaterally     Mouth/Throat:      Mouth: Mucous membranes are moist. Injury present. Dentition: Abnormal dentition. Pharynx: Oropharynx is clear. Uvula midline. Abdominal:      General: Abdomen is flat. Bowel sounds are normal.      Palpations: Abdomen is soft. Tenderness: There is abdominal tenderness in the epigastric area. There is no guarding or rebound. Hernia: No hernia is present. Skin:     General: Skin is warm and dry. Neurological:      General: No focal deficit present. Mental Status: She is alert and oriented to person, place, and time. GCS: GCS eye subscore is 4. GCS verbal subscore is 5. GCS motor subscore is 6.    Psychiatric:         Mood and Affect: Mood normal.         Behavior: Behavior normal.          MEDICAL DECISION MAKING     ED Course:    Orders Placed This Encounter    CT HEAD WO CONT    CT SPINE CERV WO CONT    CT ABD PELV W CONT    CT MAXILLOFACIAL WO CONT    CBC with Diff    CMP    Lipase    LACTIC ACID (check if patient is over 65 for rule out mesenteric ischemia.)    MAGNESIUM    DIET NPO    POC Urine Dipstick    CRITICAL CARE (ASAP ONLY)    SALINE LOCK IV ONE TIME Routine    sodium chloride (NS) flush 5-10 mL    sodium chloride (NS) flush 5-10 mL    ondansetron (ZOFRAN) injection 8 mg    morphine 10 mg/ml injection 6 mg    sodium chloride 0.9 % bolus infusion 1,000 mL    sodium chloride 0.9 % bolus infusion 1,000 mL    sodium chloride 0.9 % bolus infusion 100 mL    iopamidoL (ISOVUE-370) 76 % injection 100 mL    saline peripheral flush soln 10 mL    famotidine (PF) (PEPCID) 20 mg in 0.9% sodium chloride 10 mL injection    alum-mag hydroxide-simeth (MYLANTA) oral suspension 30 mL    lidocaine (XYLOCAINE) 2 % viscous solution 15 mL     Recent Results (from the past 8 hour(s))   CBC WITH AUTOMATED DIFF    Collection Time: 11/14/21  5:49 PM   Result Value Ref Range    WBC 6.2 4.3 - 11.1 K/uL    RBC 4.25 4.05 - 5.2 M/uL    HGB 13.5 11.7 - 15.4 g/dL    HCT 39.5 35.8 - 46.3 %    MCV 92.9 79.6 - 97.8 FL    MCH 31.8 26.1 - 32.9 PG    MCHC 34.2 31.4 - 35.0 g/dL    RDW 14.6 11.9 - 14.6 %    PLATELET 162 646 - 668 K/uL    MPV 9.2 (L) 9.4 - 12.3 FL    ABSOLUTE NRBC 0.00 0.0 - 0.2 K/uL    DF AUTOMATED      NEUTROPHILS 50 43 - 78 %    LYMPHOCYTES 39 13 - 44 %    MONOCYTES 9 4.0 - 12.0 %    EOSINOPHILS 1 0.5 - 7.8 %    BASOPHILS 1 0.0 - 2.0 %    IMMATURE GRANULOCYTES 0 0.0 - 5.0 %    ABS. NEUTROPHILS 3.1 1.7 - 8.2 K/UL    ABS. LYMPHOCYTES 2.4 0.5 - 4.6 K/UL    ABS. MONOCYTES 0.6 0.1 - 1.3 K/UL    ABS. EOSINOPHILS 0.1 0.0 - 0.8 K/UL    ABS. BASOPHILS 0.1 0.0 - 0.2 K/UL    ABS. IMM.  GRANS. 0.0 0.0 - 0.5 K/UL   METABOLIC PANEL, COMPREHENSIVE    Collection Time: 11/14/21  5:49 PM   Result Value Ref Range    Sodium 132 (L) 136 - 145 mmol/L    Potassium 4.4 3.5 - 5.1 mmol/L    Chloride 99 98 - 107 mmol/L    CO2 22 21 - 32 mmol/L    Anion gap 11 7 - 16 mmol/L    Glucose 70 65 - 100 mg/dL    BUN 6 (L) 8 - 23 MG/DL    Creatinine 0.79 0.6 - 1.0 MG/DL    GFR est AA >60 >60 ml/min/1.73m2 GFR est non-AA >60 >60 ml/min/1.73m2    Calcium 8.5 8.3 - 10.4 MG/DL    Bilirubin, total 0.5 0.2 - 1.1 MG/DL    ALT (SGPT) 21 12 - 65 U/L    AST (SGOT) 27 15 - 37 U/L    Alk. phosphatase 109 50 - 136 U/L    Protein, total 7.5 6.3 - 8.2 g/dL    Albumin 3.3 3.2 - 4.6 g/dL    Globulin 4.2 (H) 2.3 - 3.5 g/dL    A-G Ratio 0.8 (L) 1.2 - 3.5     LIPASE    Collection Time: 11/14/21  5:49 PM   Result Value Ref Range    Lipase 80 73 - 393 U/L   LACTIC ACID    Collection Time: 11/14/21  5:49 PM   Result Value Ref Range    Lactic acid 3.2 (H) 0.4 - 2.0 MMOL/L   MAGNESIUM    Collection Time: 11/14/21  5:49 PM   Result Value Ref Range    Magnesium 1.9 1.8 - 2.4 mg/dL     CT HEAD WO CONT    Result Date: 11/14/2021  CT HEAD, AND CERVICAL SPINE WITHOUT CONTRAST, 11/14/2021. History: Fall 8 days ago. Comparison: None. Technique:   5 mm axial scans from the skull base to the vertex of the head, axial 2.5 mm scans from above the orbits through the mandible with coronal reconstructed images were also performed of the face, and 1.25 mm axial scans from the skull base into the upper chest performed, and sagittal and coronal reconstructed images performed of the cervical spine. All CT scans performed at this facility use one or all of the following: Automated exposure control, adjustment of the mA and/or kVp according to patient's size, iterative reconstruction. CT HEAD: Findings: The mixed attenuation subdural collection is seen along the right lateral high convexity which demonstrates hyperdense components. The appearance is most concerning for an acute on chronic subdural hematoma. This measures up to 13 mm in width. Mild mass effect is seen with right to left midline shift measuring 6 mm at this time. .  The ventricles are normal in size and configuration. No evidence of midline shift or obvious mass effect is seen. No abnormal edema pattern is seen in a vascular distribution to suggest large artery infarction.  Evaluation with bone windows shows no acute osseous abnormality of the bony calvarium. No abnormal fluid collections are seen associated with the aerated sinuses. CT FACIAL BONES :Multiple fractures are suggested of the left maxillary sinus. This is most clearly demonstrated in the posterior wall of the maxillary sinus although does appear to extend through the left orbital floor seen on axial image 48. No abnormal depression of the orbital floor is currently seen. Some component of fracture line appears to extend to the most anterior aspect of the left zygoma seen on axial image 46. Inferior fracture line extends to the root of the left second maxillary premolar. Fluid is seen within the left axillary sinus which may represent secondary fluid. No acute facial bone fractures otherwise seen. CT CERVICAL SPINE: The skull base is grossly intact. No acute fracture line of the cervical spine is directly visualized. Only chronic degenerative changes are seen with moderate to advanced degenerative changes throughout the cervical spine involving both disc levels and posterior facets. Mild alignment abnormality is are seen without acute findings of posterior elements likely related to facet hypertrophic degenerative changes. .  The dens is intact, and the pre dens space is normal. Prevertebral soft tissues are normal. Limited evaluation of the lung apices shows no gross abnormalities. 1.  Acute on chronic right subdural hematoma with mild right to left midline shift measuring 6 mm at the septum pellucidum. 2. Multiple fractures the left maxillary sinus to include a nondisplaced fracture of the left orbital floor as described above. 3. No acute osseous abnormality of the cervical spine. Only relatively advanced chronic degenerative changes are seen. This report was made using voice transcription.  Despite my best efforts to avoid any, transcription errors may persist. If there is any question about the accuracy of the report or need for clarification, then please call [phone], or text me through [email or web address] for clarification or correction. The Critical findings acute intracranial hemorrhage was discussed with Dr. Martita Cabral, by myself personally at 7:40 PM on 11/14/2021. CT MAXILLOFACIAL WO CONT    Result Date: 11/14/2021  CT HEAD, AND CERVICAL SPINE WITHOUT CONTRAST, 11/14/2021. History: Fall 8 days ago. Comparison: None. Technique:   5 mm axial scans from the skull base to the vertex of the head, axial 2.5 mm scans from above the orbits through the mandible with coronal reconstructed images were also performed of the face, and 1.25 mm axial scans from the skull base into the upper chest performed, and sagittal and coronal reconstructed images performed of the cervical spine. All CT scans performed at this facility use one or all of the following: Automated exposure control, adjustment of the mA and/or kVp according to patient's size, iterative reconstruction. CT HEAD: Findings: The mixed attenuation subdural collection is seen along the right lateral high convexity which demonstrates hyperdense components. The appearance is most concerning for an acute on chronic subdural hematoma. This measures up to 13 mm in width. Mild mass effect is seen with right to left midline shift measuring 6 mm at this time. .  The ventricles are normal in size and configuration. No evidence of midline shift or obvious mass effect is seen. No abnormal edema pattern is seen in a vascular distribution to suggest large artery infarction. Evaluation with bone windows shows no acute osseous abnormality of the bony calvarium. No abnormal fluid collections are seen associated with the aerated sinuses. CT FACIAL BONES :Multiple fractures are suggested of the left maxillary sinus. This is most clearly demonstrated in the posterior wall of the maxillary sinus although does appear to extend through the left orbital floor seen on axial image 48.  No abnormal depression of the orbital floor is currently seen. Some component of fracture line appears to extend to the most anterior aspect of the left zygoma seen on axial image 46. Inferior fracture line extends to the root of the left second maxillary premolar. Fluid is seen within the left axillary sinus which may represent secondary fluid. No acute facial bone fractures otherwise seen. CT CERVICAL SPINE: The skull base is grossly intact. No acute fracture line of the cervical spine is directly visualized. Only chronic degenerative changes are seen with moderate to advanced degenerative changes throughout the cervical spine involving both disc levels and posterior facets. Mild alignment abnormality is are seen without acute findings of posterior elements likely related to facet hypertrophic degenerative changes. .  The dens is intact, and the pre dens space is normal. Prevertebral soft tissues are normal. Limited evaluation of the lung apices shows no gross abnormalities. 1.  Acute on chronic right subdural hematoma with mild right to left midline shift measuring 6 mm at the septum pellucidum. 2. Multiple fractures the left maxillary sinus to include a nondisplaced fracture of the left orbital floor as described above. 3. No acute osseous abnormality of the cervical spine. Only relatively advanced chronic degenerative changes are seen. This report was made using voice transcription. Despite my best efforts to avoid any, transcription errors may persist. If there is any question about the accuracy of the report or need for clarification, then please call [phone], or text me through [email or web address] for clarification or correction. The Critical findings acute intracranial hemorrhage was discussed with Dr. Dorota Mtz, by myself personally at 7:40 PM on 11/14/2021. CT SPINE CERV WO CONT    Result Date: 11/14/2021  CT HEAD, AND CERVICAL SPINE WITHOUT CONTRAST, 11/14/2021. History: Fall 8 days ago. Comparison: None. Technique:   5 mm axial scans from the skull base to the vertex of the head, axial 2.5 mm scans from above the orbits through the mandible with coronal reconstructed images were also performed of the face, and 1.25 mm axial scans from the skull base into the upper chest performed, and sagittal and coronal reconstructed images performed of the cervical spine. All CT scans performed at this facility use one or all of the following: Automated exposure control, adjustment of the mA and/or kVp according to patient's size, iterative reconstruction. CT HEAD: Findings: The mixed attenuation subdural collection is seen along the right lateral high convexity which demonstrates hyperdense components. The appearance is most concerning for an acute on chronic subdural hematoma. This measures up to 13 mm in width. Mild mass effect is seen with right to left midline shift measuring 6 mm at this time. .  The ventricles are normal in size and configuration. No evidence of midline shift or obvious mass effect is seen. No abnormal edema pattern is seen in a vascular distribution to suggest large artery infarction. Evaluation with bone windows shows no acute osseous abnormality of the bony calvarium. No abnormal fluid collections are seen associated with the aerated sinuses. CT FACIAL BONES :Multiple fractures are suggested of the left maxillary sinus. This is most clearly demonstrated in the posterior wall of the maxillary sinus although does appear to extend through the left orbital floor seen on axial image 48. No abnormal depression of the orbital floor is currently seen. Some component of fracture line appears to extend to the most anterior aspect of the left zygoma seen on axial image 46. Inferior fracture line extends to the root of the left second maxillary premolar. Fluid is seen within the left axillary sinus which may represent secondary fluid. No acute facial bone fractures otherwise seen.  CT CERVICAL SPINE: The skull base is grossly intact. No acute fracture line of the cervical spine is directly visualized. Only chronic degenerative changes are seen with moderate to advanced degenerative changes throughout the cervical spine involving both disc levels and posterior facets. Mild alignment abnormality is are seen without acute findings of posterior elements likely related to facet hypertrophic degenerative changes. .  The dens is intact, and the pre dens space is normal. Prevertebral soft tissues are normal. Limited evaluation of the lung apices shows no gross abnormalities. 1.  Acute on chronic right subdural hematoma with mild right to left midline shift measuring 6 mm at the septum pellucidum. 2. Multiple fractures the left maxillary sinus to include a nondisplaced fracture of the left orbital floor as described above. 3. No acute osseous abnormality of the cervical spine. Only relatively advanced chronic degenerative changes are seen. This report was made using voice transcription. Despite my best efforts to avoid any, transcription errors may persist. If there is any question about the accuracy of the report or need for clarification, then please call [phone], or text me through [email or web address] for clarification or correction. The Critical findings acute intracranial hemorrhage was discussed with Dr. Gricelda Saleh, by myself personally at 7:40 PM on 11/14/2021. CT ABD PELV W CONT    Result Date: 11/14/2021  CT ABDOMEN AND PELVIS WITH INTRAVENOUS CONTRAST DATED 11/14/2021. History: Fall 8 days ago. Worsening abdominal pain. Comparison: None. Technique:   Multiple contiguous helical CT images reconstructed at 5 mm intervals were obtained from above the diaphragms through the ischial tuberosities following oral and 100 cc Isovue-370 without acute complication.  All CT scans performed at this facility use one or all of the following: Automated exposure control, adjustment of the mA and/or kVp according to patient's size, iterative reconstruction. Findings: CT ABDOMEN:  Limited evaluation of the lung bases and base of the mediastinum demonstrates no significant abnormalities. The Liver appears fatty infiltrated although homogeneous. Pneumobilia seen in the left lobe. The spleen is homogeneous in attenuation. No contour deforming or enhancing mass lesions are seen of the pancreas or adrenal glands. The gallbladder has been removed. The kidneys enhance symmetrically and no evidence of hydronephrosis is seen. Changes are seen in the proximal stomach which may be related to a prior process such as fundoplication. The visualized loops of small bowel and colon are normal in caliber. The appendix is not clearly seen. No acute inflammatory structures suggested adjacent to the cecal tip. Moderate diverticulosis is seen of the sigmoid colon chest or has well without etiology May be has left-sided sinusitis. 40) symptomatic O that was one follicle in the. No free fluid, free air, or focal inflammatory changes are seen in the abdomen. No adenopathy is seen. The abdominal aorta demonstrates mild atherosclerotic calcification. No retroperitoneal hematoma is seen. . No acute osseous abnormality seen of the lumbar spine. CT PELVIS: No abnormal pelvic fluid collections or inflammatory changes are present. No pelvic adenopathy is seen. The urinary bladder is unremarkable. No acute osseous abnormality is seen of the bony pelvis. 1.  No significant acute findings in the abdomen or pelvis. This report was made using voice transcription. Despite my best efforts to avoid any, transcription errors may persist. If there is any question about the accuracy of the report or need for clarification, then please call [phone], or text me through [email or web address] for clarification or correction. ED Course as of 11/14/21 2040   Sun Nov 14, 2021   9312 Discussed with Dr. Marilee Isaacs, neurosurgery, and he reviewed patient's CT scan.   No acute intervention needed at this time. Patient's GCS is 15. He will see in consult. [JL]      ED Course User Index  [JL] Fabi Vu DO     MDM  Number of Diagnoses or Management Options  Diagnosis management comments: 61-year-old female presented emergency department for evaluation for abdominal pain. Patient's abdominal pain of unclear etiology. Patient's lactic acid is elevated however patient CT of her abdomen and pelvis are reassuring. Patient otherwise was noted to have fallen 8 days ago. CT of patient's head, face and neck revealed large acute on chronic subdural hematoma with midline shift as well as multiple facial fractures. Discussed with neurosurgery and they will see in consult. Patient will be seen by the hospitalist and will be admitted for further treatment evaluation. Discussed at length with family and patient. They are agreeable to this plan.        Amount and/or Complexity of Data Reviewed  Clinical lab tests: ordered and reviewed  Tests in the radiology section of CPT®: ordered  Decide to obtain previous medical records or to obtain history from someone other than the patient: yes  Obtain history from someone other than the patient: yes  Review and summarize past medical records: yes  Discuss the patient with other providers: yes  Independent visualization of images, tracings, or specimens: yes    Patient Progress  Patient progress: stable    Critical Care  Performed by: Fabi Vu DO  Authorized by: Fabi Vu DO     Critical care provider statement:     Critical care time (minutes):  35    Critical care was necessary to treat or prevent imminent or life-threatening deterioration of the following conditions:  CNS failure or compromise and trauma    Critical care was time spent personally by me on the following activities:  Development of treatment plan with patient or surrogate, discussions with consultants, evaluation of patient's response to treatment, examination of patient, obtaining history from patient or surrogate, review of old charts, re-evaluation of patient's condition, pulse oximetry, ordering and review of radiographic studies, ordering and review of laboratory studies and ordering and performing treatments and interventions  Comments:      Patient with large acute on chronic subdural hemorrhage, lactic acidosis, multiple facial fractures. Discussed with neurosurgery on multiple occasions. Disposition: Admitted  Diagnosis:     ICD-10-CM ICD-9-CM   1. Abdominal pain, epigastric  R10.13 789.06   2. Fall, initial encounter  [address] XXXA E888.9   3. Subdural hemorrhage (HCC)  I62.00 432.1   4. Closed fracture of maxillary sinus, [facility] encounter [facility]  S02.401A 802.4   5. Closed fracture of left orbital floor, initial encounter ([facility].)  S02. 32XA 802.6   6. Lactic acidosis  E87.2 276.2     ____________________________________________________________________  A portion of this note was generated using voice recognition dictation software. While the note has been reviewed for accuracy, please note certain words and phrases may not be transcribed as intended and some grammatical and/or typographical errors may be present.

## 2021-11-15 NOTE — PROGRESS NOTES
Pt seen resting in bed s/p admission to ICU for SDH from fall. Spouse sleeping at bedside, as well. Spoke with son, Luis Felipe Mcclure. Confirms demographics. States pt has had some issues previously, but very hard to get pt to MD. Poss previous CVA, unsure. States it took him 4 days to get her to come to ED, especially since she broke 2 teeth out with fall. Mostly independent at home. Does not drive, family provides transport. Spouse has some memory issues per son. Best to speak with son. Discussed at length poss d/c needs of HH, STR at SNF vs IRC. PT/OT recommended HH vs outpatient. Son thinks HH would be best option for pt and spouse. Aware CM to follow for d/c needs/POC. Pt just went for repeat CT. Care Management Interventions  PCP Verified by CM: Yes ([facility])  Mode of Transport at Discharge:  Other (see comment)  Transition of Care Consult (CM Consult): Discharge Planning  Discharge Durable Medical Equipment:  (none)  Support Systems: Spouse/Significant Other, Child(rayne) (pt lives with spouse, son nearby.)  Confirm Follow Up Transport: Family  Freedom of Choice List was Provided with Basic Dialogue that Supports the Patient's Individualized Plan of Care/Goals, Treatment Preferences and Shares the Quality Data Associated with the Providers?: Yes  [employer] Information Provided?:  (MCR/Supplemental)  Discharge Location  Discharge Placement: Unable to determine at this time

## 2021-11-15 NOTE — PROGRESS NOTES
ACUTE OT GOALS:  (Developed with and agreed upon by patient and/or caregiver.)  1. Patient will complete lower body bathing and dressing with MOD I and adaptive equipment as needed. 2. Patient will complete toileting with MOD I.   3. Patient will tolerate 30 minutes of OT treatment with 1-2 rest breaks to increase activity tolerance for ADLs. 4. Patient will complete functional transfers with MOD I and adaptive equipment as needed. 5. Patient will complete functional mobility for household distances with MOD I and adaptive equipment as needed. 6. Patient will complete self-grooming while standing edge of sink with MOD I and adaptive equipment as needed. Timeframe: 7 visits         OCCUPATIONAL THERAPY ASSESSMENT: Initial Assessment and Daily Note OT Treatment Day # 1    Keily Villalobos is a 78 y.o. female   PRIMARY DIAGNOSIS: <principal problem not specified>  [facility] [S06.5X9A]  Procedure(s) (LRB):  ESOPHAGOGASTRODUODENOSCOPY (EGD)/ 3105 (N/A)     Reason for Referral:   ICD-10: Treatment Diagnosis: Generalized Muscle Weakness (M62.81)  [place]PAT[place] / [place] B / Product Type: Medicare /   ASSESSMENT:     REHAB RECOMMENDATIONS:   Recommendation to date pending progress:  [address] [place] [address]  Equipment:    To Be Determined     PRIOR LEVEL OF FUNCTION:  (Prior to Hospitalization)  INITIAL/CURRENT LEVEL OF FUNCTION:  (Based on today's evaluation)   Bathing:   Independent  Dressing:   Independent  Feeding/Grooming:   Independent  Toileting:   Independent  Functional Mobility:   Independent Bathing:   Minimal Assistance  Dressing:   Minimal Assistance  Feeding/Grooming:   Set Up  Toileting:   Minimal Assistance  Functional Mobility:   Minimal Assistance     ASSESSMENT:  Ms. Praveena Addison presents with deficits in overall strength, activity tolerance, ADL performance and functional mobility.  Currently in ICU for subdural hematoma, s/p fall in the home; non-surgical per neurosurgery. BUE AROM and strength (4/5) are generally decreased but WFL. Abnormal jerking head and neck movements noted however pt reports this to be normal. Min A x 2 for functional bed mobility/transfers. SBA in static standing while pt performed self-grooming tasks. At this time, Funmi Broussard is functioning below baseline for ADLs and functional mobility. Pt would benefit from skilled OT services to address OT goals and and plan of care. .     SUBJECTIVE:   Ms. Celia Bloch states, \"I've been feeling a little nauseous. \"    SOCIAL HISTORY/LIVING ENVIRONMENT: Lives in [facility] with living accommodations on the 2nd level. Independent at baseline. Lives with .    Home Environment: Private residence  One/Two Story Residence: One story  Living Alone: No  Support Systems: Spouse/Significant Other, Child(rayne)    OBJECTIVE:     PAIN: VITAL SIGNS: LINES/DRAINS:   Pre Treatment: Pain Screen  Pain Scale 1: Numeric (0 - 10)  Pain Intensity 1: 7  Pain Onset 1: ongoing  Pain Location 1: Abdomen  Post Treatment: 7/10   IV  O2 Device: Nasal cannula     GROSS EVALUATION:  BUEs Within Functional Limits Abnormal/ Functional Abnormal/ Non-Functional (see comments) Not Tested Comments:   AROM [] [x] [] []    PROM [] [] [] []    Strength [] [x] [] [] 4/5   Balance [] [x] [] [] Good EOB sitting; fair dynamic standing   Posture [] [x] [] []    Sensation [x] [] [] []    Coordination [] [x] [] [] Mild jerking   Tone [] [] [] []    Edema [x] [] [] []    Activity Tolerance [x] [] [] []     [] [] [] []      COGNITION/  PERCEPTION: Intact Impaired   (see comments) Comments:   Orientation [x] []    Vision [] [x] L awareness   Hearing [x] []    Judgment/ Insight [] [x]    Attention [x] []    Memory [x] []    Command Following [x] []    Emotional Regulation [x] []     [] []      ACTIVITIES OF DAILY LIVING: I Mod I S SBA CGA Min Mod Max Total NT Comments   BASIC ADLs:              Bathing/ Showering [] [] [] [] [] [] [] [] [] []    Toileting [] [] [] [] [] [] [] [] [] []    Dressing [] [] [] [] [] [] [] [] [] []    Feeding [] [] [] [] [] [] [] [] [] []    Grooming [] [] [] [x] [x] [] [] [] [] [] In static standing   Personal Device Care [] [] [] [] [] [] [] [] [] []    Functional Mobility [] [] [] [] [] [x] [] [] [] [] X 2 x RW   I=Independent, Mod I=Modified Independent, S=Supervision, SBA=Standby Assistance, CGA=Contact Guard Assistance,   Min=Minimal Assistance, Mod=Moderate Assistance, Max=Maximal Assistance, Total=Total Assistance, NT=Not Tested    MOBILITY: I Mod I S SBA CGA Min Mod Max Total  NT x2 Comments:   Supine to sit [] [] [] [] [] [x] [] [] [] [] [x]    Sit to supine [] [] [] [] [] [] [] [] [] [x] []    Sit to stand [] [] [] [] [] [x] [] [] [] [] [x]    Bed to chair [] [] [] [] [] [x] [] [] [] [] [x]    I=Independent, Mod I=Modified Independent, S=Supervision, SBA=Standby Assistance, CGA=Contact Guard Assistance,   Min=Minimal Assistance, Mod=Moderate Assistance, Max=Maximal Assistance, Total=Total Assistance, NT=Not Tested    [facility] [address] [address] AM-PAC 6 Clicks   Daily Activity Inpatient Short Form        How much help from another person does the patient currently need. .. Total A Lot A Little None   1. Putting on and taking off regular lower body clothing? [] 1   [] 2   [x] 3   [] 4   2. Bathing (including washing, rinsing, drying)? [] 1   [] 2   [x] 3   [] 4   3. Toileting, which includes using toilet, bedpan or urinal?   [] 1   [] 2   [x] 3   [] 4   4. Putting on and taking off regular upper body clothing? [] 1   [] 2   [x] 3   [] 4   5. Taking care of personal grooming such as brushing teeth? [] 1   [] 2   [x] 3   [] 4   6. Eating meals? [] 1   [] 2   [x] 3   [] 4   © 2007, Trustees of [address] [place] [id number], under license to [facility].  All rights reserved     Score:  Initial: 18 Most Recent: X (Date: -- )   Interpretation of Tool:  Represents activities that are increasingly more difficult (i.e. Bed mobility, Transfers, Gait). PLAN:   FREQUENCY/DURATION: OT Plan of Care: 3 times/week for duration of hospital stay or until stated goals are met, whichever comes first.    PROBLEM LIST:   (Skilled intervention is medically necessary to address:)  1. Decreased ADL/Functional Activities  2. Decreased Activity Tolerance  3. Decreased AROM/PROM  4. Decreased Balance  5. Decreased Coordination  6. Decreased Gait Ability  7. Decreased Strength  8. Decreased Transfer Abilities  9. Increased Pain   INTERVENTIONS PLANNED:   (Benefits and precautions of occupational therapy have been discussed with the patient.)  1. Self Care Training  2. Therapeutic Activity  3. Therapeutic Exercise/HEP  4. Neuromuscular Re-education  5. Education     TREATMENT:     EVALUATION: Low Complexity : (Untimed Charge)    TREATMENT:   ($$ Self Care/Home Management: 8-22 mins$$ Neuromuscular Re-Education: 8-22 mins   )  Self Care (10 Minutes): Self care including Grooming to increase independence and decrease level of assistance required. Neuromuscular Re-education (13 Minutes): Neuromuscular Re-education included Balance Training, Coordination training, Postural training, Sitting balance training and Standing balance training to improve Balance, Coordination and Postural Control.     TREATMENT GRID:  N/A    AFTER TREATMENT POSITION/PRECAUTIONS:  Alarm Activated, Chair, Needs within reach and RN notified    INTERDISCIPLINARY COLLABORATION:  RN/PCT, PT/PTA and OT/CAMPOS    TOTAL TREATMENT DURATION:  OT Patient Time In/Time Out  Time In: 4650  Time Out: 1306 [facility], OT

## 2021-11-15 NOTE — PROGRESS NOTES
TRANSFER - IN REPORT:    Verbal report received from Bette Montanez, [address] [place] [address] (name) on Maria Antonia Gun  being received from ED (unit) for routine progression of care      Report consisted of patients Situation, Background, Assessment and   Recommendations(SBAR). Information from the following report(s) SBAR, Kardex, ED Summary, OR Summary, Procedure Summary, Intake/Output, MAR, Recent Results, Med Rec Status, Cardiac Rhythm NSR, Alarm Parameters , Pre Procedure Checklist, Procedure Verification, Quality Measures and Dual Neuro Assessment was reviewed with the receiving nurse. Opportunity for questions and clarification was provided. Assessment completed upon patients arrival to unit and care assumed.  Dual Skin Assessment completed with Ami Ladd RN at bedside - no skin breakdown or pressure injury noted

## 2021-11-16 ENCOUNTER — ANESTHESIA (OUTPATIENT)
Dept: ENDOSCOPY | Age: 76
DRG: 085 | End: 2021-11-16
Payer: MEDICARE

## 2021-11-16 ENCOUNTER — APPOINTMENT (OUTPATIENT)
Dept: CT IMAGING | Age: 76
DRG: 085 | End: 2021-11-16
Attending: NEUROLOGICAL SURGERY
Payer: MEDICARE

## 2021-11-16 LAB
ANION GAP SERPL CALC-SCNC: 3 MMOL/L (ref 7–16)
BASOPHILS # BLD: 0 K/UL (ref 0–0.2)
BASOPHILS NFR BLD: 1 % (ref 0–2)
BUN SERPL-MCNC: 4 MG/DL (ref 8–23)
CALCIUM SERPL-MCNC: 7.3 MG/DL (ref 8.3–10.4)
CHLORIDE SERPL-SCNC: 102 MMOL/L (ref 98–107)
CO2 SERPL-SCNC: 30 MMOL/L (ref 21–32)
CREAT SERPL-MCNC: 0.69 MG/DL (ref 0.6–1)
DIFFERENTIAL METHOD BLD: ABNORMAL
EOSINOPHIL # BLD: 0.1 K/UL (ref 0–0.8)
EOSINOPHIL NFR BLD: 2 % (ref 0.5–7.8)
ERYTHROCYTE [DISTWIDTH] IN BLOOD BY AUTOMATED COUNT: 14.5 % (ref 11.9–14.6)
GLUCOSE SERPL-MCNC: 148 MG/DL (ref 65–100)
HCT VFR BLD AUTO: 33.3 % (ref 35.8–46.3)
HGB BLD-MCNC: 10.6 G/DL (ref 11.7–15.4)
IMM GRANULOCYTES # BLD AUTO: 0 K/UL (ref 0–0.5)
IMM GRANULOCYTES NFR BLD AUTO: 0 % (ref 0–5)
LYMPHOCYTES # BLD: 1.1 K/UL (ref 0.5–4.6)
LYMPHOCYTES NFR BLD: 23 % (ref 13–44)
MCH RBC QN AUTO: 29.9 PG (ref 26.1–32.9)
MCHC RBC AUTO-ENTMCNC: 31.8 G/DL (ref 31.4–35)
MCV RBC AUTO: 93.8 FL (ref 79.6–97.8)
MM INDURATION POC: 0 MM (ref 0–5)
MONOCYTES # BLD: 0.5 K/UL (ref 0.1–1.3)
MONOCYTES NFR BLD: 11 % (ref 4–12)
NEUTS SEG # BLD: 2.9 K/UL (ref 1.7–8.2)
NEUTS SEG NFR BLD: 63 % (ref 43–78)
NRBC # BLD: 0 K/UL (ref 0–0.2)
PLATELET # BLD AUTO: 172 K/UL (ref 150–450)
PMV BLD AUTO: 9.6 FL (ref 9.4–12.3)
POTASSIUM SERPL-SCNC: 3.9 MMOL/L (ref 3.5–5.1)
PPD POC: NEGATIVE NEGATIVE
RBC # BLD AUTO: 3.55 M/UL (ref 4.05–5.2)
SODIUM SERPL-SCNC: 135 MMOL/L (ref 136–145)
WBC # BLD AUTO: 4.5 K/UL (ref 4.3–11.1)

## 2021-11-16 PROCEDURE — 76060000032 HC ANESTHESIA 0.5 TO 1 HR: Performed by: STUDENT IN AN ORGANIZED HEALTH CARE EDUCATION/TRAINING PROGRAM

## 2021-11-16 PROCEDURE — 2709999900 HC NON-CHARGEABLE SUPPLY: Performed by: STUDENT IN AN ORGANIZED HEALTH CARE EDUCATION/TRAINING PROGRAM

## 2021-11-16 PROCEDURE — 65270000029 HC RM PRIVATE

## 2021-11-16 PROCEDURE — 80048 BASIC METABOLIC PNL TOTAL CA: CPT

## 2021-11-16 PROCEDURE — 77030021593 HC FCPS BIOP ENDOSC BSC -A: Performed by: STUDENT IN AN ORGANIZED HEALTH CARE EDUCATION/TRAINING PROGRAM

## 2021-11-16 PROCEDURE — C9113 INJ PANTOPRAZOLE SODIUM, VIA: HCPCS | Performed by: STUDENT IN AN ORGANIZED HEALTH CARE EDUCATION/TRAINING PROGRAM

## 2021-11-16 PROCEDURE — 36415 COLL VENOUS BLD VENIPUNCTURE: CPT

## 2021-11-16 PROCEDURE — 0DB68ZX EXCISION OF STOMACH, VIA NATURAL OR ARTIFICIAL OPENING ENDOSCOPIC, DIAGNOSTIC: ICD-10-PCS | Performed by: STUDENT IN AN ORGANIZED HEALTH CARE EDUCATION/TRAINING PROGRAM

## 2021-11-16 PROCEDURE — 0DB98ZX EXCISION OF DUODENUM, VIA NATURAL OR ARTIFICIAL OPENING ENDOSCOPIC, DIAGNOSTIC: ICD-10-PCS | Performed by: STUDENT IN AN ORGANIZED HEALTH CARE EDUCATION/TRAINING PROGRAM

## 2021-11-16 PROCEDURE — 74011250637 HC RX REV CODE- 250/637: Performed by: NURSE PRACTITIONER

## 2021-11-16 PROCEDURE — 76040000025: Performed by: STUDENT IN AN ORGANIZED HEALTH CARE EDUCATION/TRAINING PROGRAM

## 2021-11-16 PROCEDURE — 74011000250 HC RX REV CODE- 250: Performed by: STUDENT IN AN ORGANIZED HEALTH CARE EDUCATION/TRAINING PROGRAM

## 2021-11-16 PROCEDURE — 74011250636 HC RX REV CODE- 250/636: Performed by: STUDENT IN AN ORGANIZED HEALTH CARE EDUCATION/TRAINING PROGRAM

## 2021-11-16 PROCEDURE — 88305 TISSUE EXAM BY PATHOLOGIST: CPT

## 2021-11-16 PROCEDURE — 74011250636 HC RX REV CODE- 250/636: Performed by: FAMILY MEDICINE

## 2021-11-16 PROCEDURE — 70450 CT HEAD/BRAIN W/O DYE: CPT

## 2021-11-16 PROCEDURE — 99223 1ST HOSP IP/OBS HIGH 75: CPT | Performed by: STUDENT IN AN ORGANIZED HEALTH CARE EDUCATION/TRAINING PROGRAM

## 2021-11-16 PROCEDURE — 74011000250 HC RX REV CODE- 250: Performed by: EMERGENCY MEDICINE

## 2021-11-16 PROCEDURE — 74011250636 HC RX REV CODE- 250/636: Performed by: INTERNAL MEDICINE

## 2021-11-16 PROCEDURE — 85025 COMPLETE CBC W/AUTO DIFF WBC: CPT

## 2021-11-16 PROCEDURE — 88312 SPECIAL STAINS GROUP 1: CPT

## 2021-11-16 PROCEDURE — 74011250637 HC RX REV CODE- 250/637: Performed by: INTERNAL MEDICINE

## 2021-11-16 RX ORDER — LIDOCAINE HYDROCHLORIDE 20 MG/ML
INJECTION, SOLUTION EPIDURAL; INFILTRATION; INTRACAUDAL; PERINEURAL AS NEEDED
Status: DISCONTINUED | OUTPATIENT
Start: 2021-11-16 | End: 2021-11-16 | Stop reason: HOSPADM

## 2021-11-16 RX ORDER — SODIUM CHLORIDE, SODIUM LACTATE, POTASSIUM CHLORIDE, CALCIUM CHLORIDE 600; 310; 30; 20 MG/100ML; MG/100ML; MG/100ML; MG/100ML
INJECTION, SOLUTION INTRAVENOUS
Status: DISCONTINUED | OUTPATIENT
Start: 2021-11-16 | End: 2021-11-16 | Stop reason: HOSPADM

## 2021-11-16 RX ORDER — PROPOFOL 10 MG/ML
INJECTION, EMULSION INTRAVENOUS
Status: DISCONTINUED | OUTPATIENT
Start: 2021-11-16 | End: 2021-11-16 | Stop reason: HOSPADM

## 2021-11-16 RX ORDER — SODIUM CHLORIDE, SODIUM LACTATE, POTASSIUM CHLORIDE, CALCIUM CHLORIDE 600; 310; 30; 20 MG/100ML; MG/100ML; MG/100ML; MG/100ML
100 INJECTION, SOLUTION INTRAVENOUS CONTINUOUS
Status: DISCONTINUED | OUTPATIENT
Start: 2021-11-16 | End: 2021-11-16

## 2021-11-16 RX ORDER — PROPOFOL 10 MG/ML
INJECTION, EMULSION INTRAVENOUS AS NEEDED
Status: DISCONTINUED | OUTPATIENT
Start: 2021-11-16 | End: 2021-11-16 | Stop reason: HOSPADM

## 2021-11-16 RX ADMIN — Medication 10 ML: at 14:05

## 2021-11-16 RX ADMIN — ONDANSETRON 4 MG: 2 INJECTION INTRAMUSCULAR; INTRAVENOUS at 09:04

## 2021-11-16 RX ADMIN — DEXTROSE MONOHYDRATE AND SODIUM CHLORIDE 75 ML/HR: 5; .45 INJECTION, SOLUTION INTRAVENOUS at 08:26

## 2021-11-16 RX ADMIN — MORPHINE SULFATE 1 MG: 2 INJECTION, SOLUTION INTRAMUSCULAR; INTRAVENOUS at 00:22

## 2021-11-16 RX ADMIN — LEVOTHYROXINE, LIOTHYRONINE 180 MG: 38; 9 TABLET ORAL at 08:12

## 2021-11-16 RX ADMIN — Medication 10 ML: at 06:04

## 2021-11-16 RX ADMIN — CHOLESTYRAMINE 4 G: 4 POWDER, FOR SUSPENSION ORAL at 17:32

## 2021-11-16 RX ADMIN — AMLODIPINE BESYLATE 5 MG: 5 TABLET ORAL at 18:35

## 2021-11-16 RX ADMIN — Medication 10 ML: at 21:10

## 2021-11-16 RX ADMIN — LOSARTAN POTASSIUM 100 MG: 50 TABLET, FILM COATED ORAL at 08:12

## 2021-11-16 RX ADMIN — Medication 400 MG: at 18:35

## 2021-11-16 RX ADMIN — LEVETIRACETAM 500 MG: 500 TABLET, FILM COATED ORAL at 08:12

## 2021-11-16 RX ADMIN — PROPOFOL 140 MCG/KG/MIN: 10 INJECTION, EMULSION INTRAVENOUS at 10:55

## 2021-11-16 RX ADMIN — SODIUM CHLORIDE 40 MG: 9 INJECTION INTRAMUSCULAR; INTRAVENOUS; SUBCUTANEOUS at 21:06

## 2021-11-16 RX ADMIN — SODIUM CHLORIDE 40 MG: 9 INJECTION INTRAMUSCULAR; INTRAVENOUS; SUBCUTANEOUS at 08:12

## 2021-11-16 RX ADMIN — Medication 10 ML: at 06:05

## 2021-11-16 RX ADMIN — LIDOCAINE HYDROCHLORIDE 100 MG: 20 INJECTION, SOLUTION EPIDURAL; INFILTRATION; INTRACAUDAL; PERINEURAL at 10:52

## 2021-11-16 RX ADMIN — SODIUM CHLORIDE, SODIUM LACTATE, POTASSIUM CHLORIDE, AND CALCIUM CHLORIDE: 600; 310; 30; 20 INJECTION, SOLUTION INTRAVENOUS at 10:37

## 2021-11-16 RX ADMIN — MORPHINE SULFATE 1 MG: 2 INJECTION, SOLUTION INTRAMUSCULAR; INTRAVENOUS at 04:54

## 2021-11-16 RX ADMIN — Medication 10 ML: at 14:04

## 2021-11-16 RX ADMIN — ONDANSETRON 4 MG: 2 INJECTION INTRAMUSCULAR; INTRAVENOUS at 23:07

## 2021-11-16 RX ADMIN — MORPHINE SULFATE 1 MG: 2 INJECTION, SOLUTION INTRAMUSCULAR; INTRAVENOUS at 09:04

## 2021-11-16 RX ADMIN — SUCRALFATE 1 G: 1 TABLET ORAL at 12:18

## 2021-11-16 RX ADMIN — MORPHINE SULFATE 1 MG: 2 INJECTION, SOLUTION INTRAMUSCULAR; INTRAVENOUS at 21:09

## 2021-11-16 RX ADMIN — ACETAMINOPHEN 650 MG: 325 TABLET ORAL at 14:36

## 2021-11-16 RX ADMIN — SUCRALFATE 1 G: 1 TABLET ORAL at 16:32

## 2021-11-16 RX ADMIN — SUCRALFATE 1 G: 1 TABLET ORAL at 21:04

## 2021-11-16 RX ADMIN — PROPOFOL 80 MG: 10 INJECTION, EMULSION INTRAVENOUS at 10:52

## 2021-11-16 RX ADMIN — LEVETIRACETAM 500 MG: 500 TABLET, FILM COATED ORAL at 18:35

## 2021-11-16 NOTE — ANESTHESIA POSTPROCEDURE EVALUATION
Procedure(s):  ESOPHAGOGASTRODUODENOSCOPY (EGD)/ 23/ ICU 3105  ESOPHAGOGASTRODUODENAL (EGD) BIOPSY. total IV anesthesia    Anesthesia Post Evaluation      Multimodal analgesia: multimodal analgesia used between 6 hours prior to anesthesia start to PACU discharge  Patient location during evaluation: bedside  Patient participation: complete - patient participated  Level of consciousness: awake  Pain management: adequate  Airway patency: patent  Anesthetic complications: no  Cardiovascular status: acceptable  Respiratory status: spontaneous ventilation and acceptable  Hydration status: acceptable  Post anesthesia nausea and vomiting:  none      INITIAL Post-op Vital signs:   Vitals Value Taken Time   /64 11/16/21 1307   Temp 36.7 °C (98 °F) 11/16/21 1139   Pulse 60 11/16/21 1310   Resp 34 11/16/21 1310   SpO2 95 % 11/16/21 1310   Vitals shown include unvalidated device data.

## 2021-11-16 NOTE — CONSULTS
HPI:  Ren Downey is a 78 y.o. female seen for a consultation from No ref. provider found for Abdominal Pain. Patient seen as an inpatient consultation request for complaint of facial fractures. She unfortunately had a fall at home falling straight onto her face. She has a subdural hematoma and initial trauma work-up via maxillofacial CT imaging does show obvious fractures to the left maxillary sinus posterior wall with extension up to the orbital floor. There is also a small fracture to the zygoma. All fractures are nondisplaced. Patient denies having had any epistaxis during or after the event. She denies having any vision changes or diplopia. She is got a severe headache ongoing all day long but has not had any significant facial pain or pressure. Past Medical History, Past Surgical History, Family history, Social History, and Medications were all reviewed with the patient today and updated as necessary.      Allergies   Allergen Reactions    Benadryl [Diphenhydramine Hcl] Anxiety    Codeine Itching    Compazine [Prochlorperazine Edisylate] Other (comments)     Left face drawing    Nubain [Nalbuphine] Anxiety    Stadol [Butorphanol Tartrate] Anxiety    Talwin [Pentazocine Lactate] Anxiety    Trilafon Anxiety    Ultram [Tramadol] Unknown (comments)     Pt denies       Patient Active Problem List   Diagnosis Code    Hypothyroidism E03.9    GERD (gastroesophageal reflux disease) K21.9    Essential hypertension I10    Chronic cough R05.3    Pulmonary nodule, left R91.1    Dyslipidemia E78.5    CVA (cerebrovascular accident) ([facility] [address] I63.9    Vitamin D deficiency E55.9    Peptic ulcer disease K27.9    Conversion disorder F44.9    Chronic fatigue R53.82    Intractable migraine with aura without status migrainosus G43.119    Subdural hematoma (HCC) S06.5X9A    Abdominal pain R10.9    Chronic diarrhea K52.9    Orbital floor fracture (HCC) S02.30XA    Maxillary sinus fracture (HCC) S02.401A       Current Facility-Administered Medications   Medication Dose Route Frequency    amLODIPine (NORVASC) tablet 5 mg  5 mg Oral QPM    carvediloL (COREG) tablet 25 mg  25 mg Oral BID WITH MEALS    cholecalciferol (VITAMIN D3) (1000 Units /25 mcg) tablet 2,000 Units  2,000 Units Oral DAILY    cholestyramine-aspartame (QUESTRAN LIGHT) packet 4 g  4 g Oral BID WITH MEALS    cyanocobalamin tablet 500 mcg  500 mcg Oral 7am    losartan (COZAAR) tablet 100 mg  100 mg Oral DAILY    magnesium oxide (MAG-OX) tablet 400 mg  400 mg Oral BID    thyroid (Pork) (ARMOUR) tablet 180 mg  180 mg Oral DAILY    sodium chloride (NS) flush 5-40 mL  5-40 mL IntraVENous Q8H    sodium chloride (NS) flush 5-40 mL  5-40 mL IntraVENous PRN    acetaminophen (TYLENOL) tablet 650 mg  650 mg Oral Q6H PRN    Or    acetaminophen (TYLENOL) suppository 650 mg  650 mg Rectal Q6H PRN    polyethylene glycol (MIRALAX) packet 17 g  17 g Oral DAILY PRN    tuberculin injection 5 Units  5 Units IntraDERMal ONCE    sucralfate (CARAFATE) tablet 1 g  1 g Oral AC&HS    hydrALAZINE (APRESOLINE) 20 mg/mL injection 20 mg  20 mg IntraVENous Q6H PRN    hydroCHLOROthiazide (HYDRODIURIL) tablet 25 mg  25 mg Oral DAILY    dextrose 40% (GLUTOSE) oral gel 1 Tube  15 g Oral PRN    glucagon (GLUCAGEN) injection 1 mg  1 mg IntraMUSCular PRN    dextrose (D50W) injection syrg 12.5-25 g  25-50 mL IntraVENous PRN    dextrose 5 % - 0.45% NaCl infusion  75 mL/hr IntraVENous CONTINUOUS    levETIRAcetam (KEPPRA) tablet 500 mg  500 mg Oral BID    pantoprazole (PROTONIX) 40 mg in 0.9% sodium chloride 10 mL injection  40 mg IntraVENous Q12H    sodium chloride (NS) flush 5-10 mL  5-10 mL IntraVENous Q8H    sodium chloride (NS) flush 5-10 mL  5-10 mL IntraVENous PRN    morphine injection 1 mg  1 mg IntraVENous Q4H PRN    ondansetron (ZOFRAN) injection 4 mg  4 mg IntraVENous Q4H PRN       Past Medical History:   Diagnosis Date    Abdominal pain, generalized     Arthritis     left thumb    Backache     Cerebrovascular disease     Diarrhea     Encounter for long-term (current) drug use 11/13/2015    Endometriosis     Esophageal stricture     stretched in 05/2012    Esophageal ulcer     Fatigue     Gastric ulcer 9/2/2011    Gastric ulcer 8/11/2016    1. EGD May 2015 with several ulcers.  GERD (gastroesophageal reflux disease)     no treatment at this time.      Hearing loss     Herpes zoster     Hiatal hernia--s/p laparoscopoic nissen  7/18/2012    History of CVA (cerebrovascular accident) Dx: 2014    no deficits    History of DVT of lower extremity 9/30/2013    RLE     History of seizure     once/ 2 days after surgery    Hormone replacement therapy     Hormone Pellets    Hx of deep vein thrombophlebitis of lower extremity     total 3. 2 blood clots in 1975 and 1 blood clot in 1981 to Right knee area    Hypertension     managed with meds    Hypokalemia 9/2/2011    Hypothyroid     managed with meds    IBS (irritable bowel syndrome)     diet controlled     Itchy scalp     Long term (current) use of aspirin     Migraine     Nausea with vomiting     Neoplasm of uncertain behavior of skin     Personal history of kidney stones     passed on her own    Pulmonary nodule     Shoulder pain     Skin tag     neck, #25 excised    Sphincter of Oddi dysfunction 2017    Stroke ([address]     Syncope     URI (upper respiratory infection)     Vitamin D deficiency 11/13/2015       Past Surgical History:   Procedure Laterality Date    COLONOSCOPY      EGD  2011    Dilation    ERCP  1996, 2011    Papillotomy    ERCP  12/9/2020         ERCP  9/1/2021         HX APPENDECTOMY      HX BREAST LUMPECTOMY      HX ENDOSCOPY  01/12/2018    stent removal    HX ERCP  2017    pancreatic stent    HX GI      Nissen fundiplication    HX LAP CHOLECYSTECTOMY      HX BALJINDER AND BSO      HX TONSILLECTOMY  childhood       Social History     Tobacco Use  Smoking status: Never Smoker    Smokeless tobacco: Never Used   Substance Use Topics    Alcohol use: Yes     Alcohol/week: 3.0 standard drinks     Types: 3 Glasses of wine per week       Family History   Problem Relation Age of Onset    Heart Disease Mother     Heart Disease Father     Hypertension Brother     Hypertension Brother     Diabetes Brother     Cancer Other         aunt, bone ca    Cancer Other         3 aunts with breast ca    Cancer Maternal Aunt         ROS:    Review of Systems   Constitutional: Negative for chills and fever. HENT: Negative for hearing loss. Eyes: Negative for blurred vision and double vision. Respiratory: Negative for cough. Cardiovascular: Negative for chest pain. Gastrointestinal: Negative for heartburn. Genitourinary: Negative for dysuria. Musculoskeletal: Negative for myalgias. Skin: Negative for rash. Neurological: Positive for headaches. Negative for dizziness. Psychiatric/Behavioral: Negative for depression. PHYSICAL EXAM:    Visit Vitals  BP (!) 94/52   Pulse (!) 51   Temp 98.4 °F (36.9 °C)   Resp 30   Ht 5' 2.5\" (1.588 m)   Wt 126 lb 15.8 oz (57.6 kg)   SpO2 99%   Breastfeeding No   BMI 22.86 kg/m²       Physical Exam  Vitals and nursing note reviewed. Constitutional:       General: She is awake. She is not in acute distress. Appearance: Normal appearance. She is well-developed. She is not ill-appearing or diaphoretic. HENT:      Head: Normocephalic and atraumatic. Jaw: No trismus, tenderness, swelling or pain on movement. Salivary Glands: Right salivary gland is not diffusely enlarged or tender. Left salivary gland is not diffusely enlarged or tender. Comments: Bilateral infraorbital ecchymoses. Minimal tenderness to palpation over the left maxillary sinus or zygoma. Extraocular motions are intact without entrapment of muscle or diplopia. No enophthalmos or exophthalmos.   No obvious evidence of epistaxis. Palpation of nasal bones within normal limits. Right Ear: Tympanic membrane, ear canal and external ear normal. No drainage, swelling or tenderness. No middle ear effusion. There is no impacted cerumen. Tympanic membrane is not perforated. Left Ear: Tympanic membrane, ear canal and external ear normal. No drainage, swelling or tenderness. No middle ear effusion. There is no impacted cerumen. Tympanic membrane is not perforated. Nose: Nose normal. No nasal deformity, nasal tenderness, mucosal edema, congestion or rhinorrhea. Right Nostril: No epistaxis, septal hematoma or occlusion. Left Nostril: No epistaxis, septal hematoma or occlusion. Mouth/Throat:      Lips: No lesions. Mouth: Mucous membranes are moist. No injury or oral lesions. Tongue: No lesions. Tongue does not deviate from midline. Palate: No mass and lesions. Pharynx: Oropharynx is clear. Uvula midline. No pharyngeal swelling, oropharyngeal exudate, posterior oropharyngeal erythema or uvula swelling. Tonsils: No tonsillar exudate or tonsillar abscesses. Eyes:      General: No scleral icterus. Extraocular Movements: Extraocular movements intact. Conjunctiva/sclera: Conjunctivae normal.      Pupils: Pupils are equal, round, and reactive to light. Neck:      Thyroid: No thyroid mass or thyromegaly. Trachea: Trachea and phonation normal. No tracheal tenderness. Pulmonary:      Effort: Pulmonary effort is normal. No tachypnea. Breath sounds: No stridor. Musculoskeletal:      Cervical back: Normal range of motion and neck supple. No edema, erythema or rigidity. Lymphadenopathy:      Head:      Right side of head: No submental, submandibular, preauricular or posterior auricular adenopathy. Left side of head: No submental, submandibular, preauricular or posterior auricular adenopathy. Cervical: No cervical adenopathy.       Right cervical: No superficial, deep or posterior cervical adenopathy. Left cervical: No superficial, deep or posterior cervical adenopathy. Skin:     General: Skin is warm and dry. Neurological:      Mental Status: She is alert and oriented to person, place, and time. Cranial Nerves: Cranial nerves are intact. No facial asymmetry. Psychiatric:         Mood and Affect: Mood and affect normal.         Behavior: Behavior normal.              ASSESSMENT and PLAN        ICD-10-CM ICD-9-CM    1. Abdominal pain, epigastric  R10.13 789.06    2. Fall, initial encounter  [address] XXXA E888.9    3. Subdural hemorrhage (HCC)  I62.00 432.1    4. Closed fracture of maxillary sinus, [facility] ([facility]  S02.401A 802.4    5. Closed fracture of left orbital floor, initial encounter ([facility]  S02. 32XA 802.6    6. Lactic acidosis  E87.2 276.2    7. Subdural hematoma (HCC)  S06.5X9A 432.1 levETIRAcetam (KEPPRA) tablet 500 mg       Patient has obvious left-sided sinonasal maxillary sinus and orbital floor fractures that are all nondisplaced and without concerns including no visual changes or entrapment of extraocular muscles and no epistaxis. She is breathing just fine and no obvious step-off deformities on exam.    Recommend nasal saline misting sprays twice daily and avoidance of blowing her nose for the next few days. No indication for acute ENT intervention. She can follow-up in the ENT office as needed.     Jocelyn Marcial,   11/16/2021

## 2021-11-16 NOTE — PROGRESS NOTES
Verbal report given to Michael Harmon RN in GI Lab for scheduled EGD. Per RN may give scheduled Coreg, Hydrodiuril, Keppra, Cozaar, Protonix, and thyroid medication and hold other PO medications.

## 2021-11-16 NOTE — ANESTHESIA PREPROCEDURE EVALUATION
Anesthetic History     PONV          Review of Systems / Medical History  Patient summary reviewed and pertinent labs reviewed    Pulmonary  Within defined limits                 Neuro/Psych     seizures (One in 1995)  CVA (2014): no residual symptoms  Headaches (Migraine) and psychiatric history (Conversion D/o)     Cardiovascular    Hypertension: well controlled          Hyperlipidemia    Exercise tolerance: >4 METS  Comments: Denies CP, SOB or changes in functional status    Unremarkable echo 2015 and cath 2014   GI/Hepatic/Renal     GERD: well controlled    Renal disease: stones  Hiatal hernia (s/p Nissen) and PUD    Comments: H/o N/V/Diarrhea, Chronic Pancreatitis as well as IBS and PUD. Endo/Other      Hypothyroidism: well controlled  Arthritis     Other Findings              Physical Exam    Airway  Mallampati: II  TM Distance: 4 - 6 cm  Neck ROM: normal range of motion   Mouth opening: Normal     Cardiovascular    Rhythm: regular  Rate: normal         Dental    Dentition: Caps/crowns     Pulmonary  Breath sounds clear to auscultation               Abdominal  GI exam deferred       Other Findings            Anesthetic Plan    ASA: 3  Anesthesia type: total IV anesthesia          Induction: Intravenous  Anesthetic plan and risks discussed with: Patient      Pt is s/p fall ~2 weeks ago with acute on chronic SDH and facial fractures, specifically non-displaced left-sided sinonasal maxillary sinus and orbital floor fractures. She also knocked out 2 teeth during that fall. Currently in ICU for neuro checks. Not on any drips/pressors. Has bilateral infraorbital ecchymosis.

## 2021-11-16 NOTE — PROGRESS NOTES
TRANSFER - OUT REPORT:    Verbal report given to Al, RN (name) on Fang Montes  being transferred to [facility] (unit) for routine progression of care       Report consisted of patients Situation, Background, Assessment and   Recommendations(SBAR). Information from the following report(s) SBAR, Kardex, ED Summary, Procedure Summary, Intake/Output, MAR, Recent Results and Cardiac Rhythm SB/SR was reviewed with the receiving nurse. Lines:   Peripheral IV 11/14/21 Left; Posterior Forearm (Active)   Site Assessment Clean, dry, & intact 11/16/21 0701   Phlebitis Assessment 0 11/16/21 0701   Infiltration Assessment 0 11/16/21 0701   Dressing Status Clean, dry, & intact 11/16/21 0701   Dressing Type Tape; Transparent 11/16/21 0701   Hub Color/Line Status Blue; Patent; Flushed; Infusing 11/16/21 0701   Alcohol Cap Used No 11/16/21 0701       Peripheral IV 11/14/21 Anterior; Left; Proximal Forearm (Active)   Site Assessment Clean, dry, & intact 11/16/21 0701   Phlebitis Assessment 0 11/16/21 0701   Infiltration Assessment 0 11/16/21 0701   Dressing Status Clean, dry, & intact 11/16/21 0701   Dressing Type Tape; Transparent 11/16/21 0701   Hub Color/Line Status Pink; Patent; Flushed 11/16/21 0701   Alcohol Cap Used No 11/16/21 0701        Opportunity for questions and clarification was provided.       Patient transported with:   Registered Nurse, telebox, patient belongings   Dual neuro assessment to be completed with receiving RN

## 2021-11-16 NOTE — PROGRESS NOTES
Occupational Therapy Note:    OT treatment attempted however patient currently GEENA for EGD. WIll check back later as patient's condition and schedule permits.     Tarsha Moya, OTR/L, CLT

## 2021-11-16 NOTE — PROGRESS NOTES
TRANSFER - OUT REPORT:    Verbal report given to Jeannette Donaldson RN(name) on [facility]  being transferred to [facility](unit) for routine post - op       Report consisted of patients Situation, Background, Assessment and   Recommendations(SBAR). Information from the following report(s) SBAR and Procedure Summary was reviewed with the receiving nurse. Lines:   Peripheral IV 11/14/21 Left; Posterior Forearm (Active)   Site Assessment Clean, dry, & intact 11/16/21 0701   Phlebitis Assessment 0 11/16/21 0701   Infiltration Assessment 0 11/16/21 0701   Dressing Status Clean, dry, & intact 11/16/21 0701   Dressing Type Tape; Transparent 11/16/21 0701   Hub Color/Line Status Blue; Patent; Flushed; Infusing 11/16/21 0701   Alcohol Cap Used No 11/16/21 0701       Peripheral IV 11/14/21 Anterior; Left; Proximal Forearm (Active)   Site Assessment Clean, dry, & intact 11/16/21 0701   Phlebitis Assessment 0 11/16/21 0701   Infiltration Assessment 0 11/16/21 0701   Dressing Status Clean, dry, & intact 11/16/21 0701   Dressing Type Tape; Transparent 11/16/21 0701   Hub Color/Line Status Pink; Patent; Flushed 11/16/21 0701   Alcohol Cap Used No 11/16/21 0701        Opportunity for questions and clarification was provided.       Patient transported with:   Monitor  O2 @ 3 liters  Registered Nurse

## 2021-11-16 NOTE — PROGRESS NOTES
[facility]    Assessment: Right subdural hemorrhage      11/15/21 @ 1500      11/16/21 @0329  Plan:  -repeat head CT remains unchanged  -continue to hold all antiplatelets and anticoagulants    -Pt will need a repeat head CT done next week and to follow up in the office after    -continue keppra 500mg BID for seizure precaution    Subjective: Pt states she can not take tylenol for her headaches because it upsets her stomach    Objective:  Afebrile  VSS  GCS15  Moving all extremities  Alert and oriented X4      Patient Vitals for the past 12 hrs:   Temp Pulse Resp BP SpO2   11/16/21 0812  61  116/62    11/16/21 0701  (!) 59 20 (!) 108/58 95 %   11/16/21 0620   24     11/16/21 0619   20     11/16/21 0602  (!) 54 (!) 57 (!) 114/55 94 %   11/16/21 0532  (!) 54 (!) 55 (!) 108/52 92 %   11/16/21 0502  (!) 55 (!) 44 (!) 102/55 91 %   11/16/21 0406  (!) 57 (!) 68 116/61 95 %   11/16/21 0352  (!) 57 (!) 46 (!) 109/59 94 %   11/16/21 0307 98.4 °F (36.9 °C) (!) 55 25 (!) 104/53 100 %   11/16/21 0232  (!) 51 25 (!) 93/50 99 %   11/16/21 0202  (!) 50 23 (!) 94/51 100 %   11/16/21 0132  (!) 53 (!) 31 (!) 94/51 99 %   11/16/21 0101  (!) 52 17 (!) 103/55 99 %   11/16/21 0032  (!) 52 (!) 35 (!) 101/52 99 %   11/16/21 0001  (!) 51 30 (!) 94/52 99 %   11/15/21 2333  (!) 57 (!) 41 (!) 109/55 99 %   11/15/21 2302 98.4 °F (36.9 °C) (!) 52 24 (!) 97/50 99 %   11/15/21 2201  (!) 51 14 (!) 114/58 100 %   11/15/21 2133  (!) 58 (!) 59 132/64 100 %   11/15/21 2102  (!) 54 24 (!) 96/55 100 %        Recent Results (from the past 24 hour(s))   SARS-COV-2    Collection Time: 11/15/21  5:57 PM   Result Value Ref Range    SARS-CoV-2 Please find results under separate order     COVID-19 RAPID TEST    Collection Time: 11/15/21  5:57 PM   Result Value Ref Range    Specimen source Nasopharyngeal      COVID-19 rapid test Not detected NOTD     PLEASE READ & DOCUMENT PPD TEST IN 24 HRS    Collection Time: 11/16/21 12:30 AM   Result Value Ref Range    PPD Negative Negative    mm Induration 0 0 - 5 mm   CBC WITH AUTOMATED DIFF    Collection Time: 11/16/21  3:10 AM   Result Value Ref Range    WBC 4.5 4.3 - 11.1 K/uL    RBC 3.55 (L) 4.05 - 5.2 M/uL    HGB 10.6 (L) 11.7 - 15.4 g/dL    HCT 33.3 (L) 35.8 - 46.3 %    MCV 93.8 79.6 - 97.8 FL    MCH 29.9 26.1 - 32.9 PG    MCHC 31.8 31.4 - 35.0 g/dL    RDW 14.5 11.9 - 14.6 %    PLATELET 014 039 - 812 K/uL    MPV 9.6 9.4 - 12.3 FL    ABSOLUTE NRBC 0.00 0.0 - 0.2 K/uL    DF AUTOMATED      NEUTROPHILS 63 43 - 78 %    LYMPHOCYTES 23 13 - 44 %    MONOCYTES 11 4.0 - 12.0 %    EOSINOPHILS 2 0.5 - 7.8 %    BASOPHILS 1 0.0 - 2.0 %    IMMATURE GRANULOCYTES 0 0.0 - 5.0 %    ABS. NEUTROPHILS 2.9 1.7 - 8.2 K/UL    ABS. LYMPHOCYTES 1.1 0.5 - 4.6 K/UL    ABS. MONOCYTES 0.5 0.1 - 1.3 K/UL    ABS. EOSINOPHILS 0.1 0.0 - 0.8 K/UL    ABS. BASOPHILS 0.0 0.0 - 0.2 K/UL    ABS. IMM.  GRANS. 0.0 0.0 - 0.5 K/UL   METABOLIC PANEL, BASIC    Collection Time: 11/16/21  3:10 AM   Result Value Ref Range    Sodium 135 (L) 136 - 145 mmol/L    Potassium 3.9 3.5 - 5.1 mmol/L    Chloride 102 98 - 107 mmol/L    CO2 30 21 - 32 mmol/L    Anion gap 3 (L) 7 - 16 mmol/L    Glucose 148 (H) 65 - 100 mg/dL    BUN 4 (L) 8 - 23 MG/DL    Creatinine 0.69 0.6 - 1.0 MG/DL    GFR est AA >60 >60 ml/min/1.73m2    GFR est non-AA >60 >60 ml/min/1.73m2    Calcium 7.3 (L) 8.3 - 10.4 MG/DL        Kate Raza NP

## 2021-11-16 NOTE — PROGRESS NOTES
Hospitalist Progress Note   Admit Date:  2021  5:28 PM   Name:  Carolynn Blanco   Age:  78 y.o. Sex:  female  :  1945   MRN:  [id number]   Room:  [facility]/    Presenting Complaint: Abdominal Pain    Reason(s) for Admission: Subdural hematoma [facility] Course & Interval History:     Ms. Garfield Griffiths is a 79 yo female with PMH of chronic pancreatitis, conversion disorder, nephrolithiasis, GERD, PUD, TIA, IBS admitted with recurrent nausea/ abd pain/ diarrhea with recent fall. GI consulted to evaluate her GI complaints. She has pending cdiff and typically takes Sima. She had some hypoglycemia and on dextrose IVF. CTAP no acute issues. NPO on  for EGD per GI. During her fall, she hit  her face and knocked out 2 left sided front teeth. Has not seen dental to date. CT head shows acute on chronic right SDH with mild right to left midline shift of 6 mm as well as left maxillary sinus fractures and a nondisplaced left orbital floor fracture. Neurosurgery has consulted and no acute surgical needs noted. ENT consulted and non surgical. followup CT stable. keppra added for seizure prophylaxis. Discharge plans pending. Subjective (21):       Has ongoing nausea, abd pain, no diarrhea at present, has refractory headache but has intolerances to pain meds excluding morphine- states tylenol causes stomach pain,       Assessment & Plan:       Subdural hematoma ([address] (2021)  Left Orbital floor fracture   Left maxillary sinus fractures   · followup CT head stable   · Neurosurgery/ENT evaluated and no acute needs  · Transfer to floor    · neurochecks   · BP goals < 140/90   · PT,OT, PPD   · Continued keppra       Active Problems:    Hypothyroidism (2013)  ·   Continued armour thyroid - will need dose adjusted as TSH elevated         GERD (gastroesophageal reflux disease) (2013)  ·   IV protonix        Essential hypertension (9/30/2013)  ·   Continued norvasc, cozaar  · Hold  coreg, HCTZ due to bradycardia and intermittent hypotension         Dyslipidemia (5/20/2014)         Peptic ulcer disease (8/11/2016)      Abdominal pain (11/14/2021)     Chronic diarrhea (11/15/2021)  Chronic pancreatitis  Chronic nausea and emesis   · GI consulted   · EGD planned for 11-16  · cdiff stool testing pending -no current diarrhea   · Continued questran, carafate protonix       Hypoglycemia:  · On dextrose IVF  · NPO  · Glucoses acceptable       Anemia:  · Trend HGB        Dispo/Discharge Planning:      Pending , floor after EGD today     Diet:  DIET NPO  DVT PPx: SCD  Code status: Full Code    Hospital Problems as of 11/16/2021 Date Reviewed: 7/15/2021          Codes Class Noted - Resolved POA    Chronic diarrhea ICD-10-CM: K52.9  ICD-9-CM: 787.91  11/15/2021 - Present Unknown        Orbital floor fracture ([facility] [address] ICD-10-CM: S02.30XA  ICD-9-CM: 802.6  11/15/2021 - Present Yes        Maxillary sinus fracture ([facility] [address] ICD-10-CM: S02.401A  ICD-9-CM: 802.4  11/15/2021 - Present Yes        Subdural hematoma ([facility] [address] ICD-10-CM: X58.5E3T  ICD-9-CM: 432.1  11/14/2021 - Present Unknown        Abdominal pain ICD-10-CM: R10.9  ICD-9-CM: 789.00  11/14/2021 - Present Unknown        Peptic ulcer disease ICD-10-CM: K27.9  ICD-9-CM: 533.90  8/11/2016 - Present Unknown    Overview Signed 8/11/2016  1:45 PM by Byron Pierre     1. EGD May 2015 with several ulcers.               Dyslipidemia (Chronic) ICD-10-CM: E78.5  ICD-9-CM: 272.4  5/20/2014 - Present Yes        Hypothyroidism ICD-10-CM: E03.9  ICD-9-CM: 244.9  9/30/2013 - Present Yes        GERD (gastroesophageal reflux disease) (Chronic) ICD-10-CM: K21.9  ICD-9-CM: 530.81  9/30/2013 - Present Yes    Overview Signed 11/1/2013  4:43 PM by Vianey Mix NP     S/p Nissen Fundoplication             Essential hypertension ICD-10-CM: I10  ICD-9-CM: 401.9  9/30/2013 - Present Yes    Overview Signed 8/11/2016  1:44 PM by Byron Lemus     1. Echo (5/20/14) : Normal LV systolic function. EF 55-60%. Normal diastolic function. RVSP 40-45. Mild to moderate tricuspid regurgitation. Mild mitral regurgitation. 2.  Renal artery US (7/8/14): No evidence of renal artery stenosis. 3.  Plasma Metanephrine. (6/25/14): Normal  4. Echo (11/14):  EF 43-29%  Grade 1 diastolic dysfunction. Mild mitral regurgitaiton.                     Objective:     Patient Vitals for the past 24 hrs:   Temp Pulse Resp BP SpO2   11/16/21 0812  61  116/62    11/16/21 0701  (!) 59 20 (!) 108/58 95 %   11/16/21 0620   24     11/16/21 0619   20     11/16/21 0602  (!) 54 (!) 57 (!) 114/55 94 %   11/16/21 0532  (!) 54 (!) 55 (!) 108/52 92 %   11/16/21 0502  (!) 55 (!) 44 (!) 102/55 91 %   11/16/21 0406  (!) 57 (!) 68 116/61 95 %   11/16/21 0352  (!) 57 (!) 46 (!) 109/59 94 %   11/16/21 0307 98.4 °F (36.9 °C) (!) 55 25 (!) 104/53 100 %   11/16/21 0232  (!) 51 25 (!) 93/50 99 %   11/16/21 0202  (!) 50 23 (!) 94/51 100 %   11/16/21 0132  (!) 53 (!) 31 (!) 94/51 99 %   11/16/21 0101  (!) 52 17 (!) 103/55 99 %   11/16/21 0032  (!) 52 (!) 35 (!) 101/52 99 %   11/16/21 0001  (!) 51 30 (!) 94/52 99 %   11/15/21 2333  (!) 57 (!) 41 (!) 109/55 99 %   11/15/21 2302 98.4 °F (36.9 °C) (!) 52 24 (!) 97/50 99 %   11/15/21 2201  (!) 51 14 (!) 114/58 100 %   11/15/21 2133  (!) 58 (!) 59 132/64 100 %   11/15/21 2102  (!) 54 24 (!) 96/55 100 %   11/15/21 2033  (!) 54 20 112/64 100 %   11/15/21 2002  (!) 54 18 (!) 107/57 99 %   11/15/21 1932  (!) 55 20 (!) 103/51 97 %   11/15/21 1902 97.9 °F (36.6 °C) (!) 53 17 (!) 92/54 98 %   11/15/21 1759  (!) 55 19 (!) 95/52 99 %   11/15/21 1659  (!) 53 27 (!) 114/56 100 %   11/15/21 1629  62 25 (!) 107/59 99 %   11/15/21 1614  (!) 58 17 (!) 106/59 99 %   11/15/21 1559  60 15 121/65 100 %   11/15/21 1529 98.1 °F (36.7 °C) (!) 55 22 112/60 99 %   11/15/21 1459  61 27 (!) 110/56 98 %   11/15/21 1429  60 (!) 35  91 %   11/15/21 1359  60 17 (!) 82/60 92 %   11/15/21 1329  60 20 (!) 115/59 90 %   11/15/21 1259  69 (!) 33 (!) 121/57    11/15/21 1229  (!) 59 (!) 32  99 %   11/15/21 1159 98 °F (36.7 °C) (!) 54 23 (!) 113/55 99 %   11/15/21 1129  (!) 55 27  99 %   11/15/21 1102  (!) 58 29 108/74 96 %   11/15/21 1059  61 (!) 45  96 %   11/15/21 1029  (!) 58 (!) 54 (!) 124/90 96 %   11/15/21 0959  (!) 58 21 123/63 92 %   11/15/21 0900  61 17 125/64 98 %   11/15/21 0830  67 20 136/66 98 %     Oxygen Therapy  O2 Sat (%): 95 % (11/16/21 0701)  Pulse via Oximetry: 58 beats per minute (11/16/21 0701)  O2 Device: Nasal cannula (11/16/21 0307)  Skin Assessment: Clean, dry, & intact (11/15/21 2302)  O2 Flow Rate (L/min): 2 l/min (11/16/21 0307)    Estimated body mass index is 22.86 kg/m² as calculated from the following:    Height as of this encounter: 5' 2.5\" (1.588 m). Weight as of this encounter: 57.6 kg (126 lb 15.8 oz). Intake/Output Summary (Last 24 hours) at 11/16/2021 0819  Last data filed at 11/16/2021 0606  Gross per 24 hour   Intake 1802.5 ml   Output 2100 ml   Net -297.5 ml         Physical Exam:     Blood pressure 116/62, pulse 61, temperature 98.4 °F (36.9 °C), resp. rate 20, height 5' 2.5\" (1.588 m), weight 57.6 kg (126 lb 15.8 oz), SpO2 95 %, not currently breastfeeding. General:    Well nourished. No overt distress, elderly, alert and pleasant   Head:  Facial bruising, missing 2 front teeth  CV:   RRR. No m/r/g. No jugular venous distension. No edema   Lungs:   CTAB. No wheezing, rhonchi, or rales. Respirations even, unlabored anterior   Abdomen: Bowel sounds present. Soft, nontender, nondistended. Extremities: No cyanosis or clubbing. No edema  Skin:     No rashes and normal coloration. Warm and dry. Neuro:   grossly intact. Psych:  Normal mood and affect.       I have reviewed ordered lab tests and independently visualized imaging below:    Recent Labs:  Recent Results (from the past 48 hour(s))   CBC WITH AUTOMATED DIFF    Collection Time: 11/14/21  5:49 PM   Result Value Ref Range    WBC 6.2 4.3 - 11.1 K/uL    RBC 4.25 4.05 - 5.2 M/uL    HGB 13.5 11.7 - 15.4 g/dL    HCT 39.5 35.8 - 46.3 %    MCV 92.9 79.6 - 97.8 FL    MCH 31.8 26.1 - 32.9 PG    MCHC 34.2 31.4 - 35.0 g/dL    RDW 14.6 11.9 - 14.6 %    PLATELET 411 488 - 705 K/uL    MPV 9.2 (L) 9.4 - 12.3 FL    ABSOLUTE NRBC 0.00 0.0 - 0.2 K/uL    DF AUTOMATED      NEUTROPHILS 50 43 - 78 %    LYMPHOCYTES 39 13 - 44 %    MONOCYTES 9 4.0 - 12.0 %    EOSINOPHILS 1 0.5 - 7.8 %    BASOPHILS 1 0.0 - 2.0 %    IMMATURE GRANULOCYTES 0 0.0 - 5.0 %    ABS. NEUTROPHILS 3.1 1.7 - 8.2 K/UL    ABS. LYMPHOCYTES 2.4 0.5 - 4.6 K/UL    ABS. MONOCYTES 0.6 0.1 - 1.3 K/UL    ABS. EOSINOPHILS 0.1 0.0 - 0.8 K/UL    ABS. BASOPHILS 0.1 0.0 - 0.2 K/UL    ABS. IMM. GRANS. 0.0 0.0 - 0.5 K/UL   METABOLIC PANEL, COMPREHENSIVE    Collection Time: 11/14/21  5:49 PM   Result Value Ref Range    Sodium 132 (L) 136 - 145 mmol/L    Potassium 4.4 3.5 - 5.1 mmol/L    Chloride 99 98 - 107 mmol/L    CO2 22 21 - 32 mmol/L    Anion gap 11 7 - 16 mmol/L    Glucose 70 65 - 100 mg/dL    BUN 6 (L) 8 - 23 MG/DL    Creatinine 0.79 0.6 - 1.0 MG/DL    GFR est AA >60 >60 ml/min/1.73m2    GFR est non-AA >60 >60 ml/min/1.73m2    Calcium 8.5 8.3 - 10.4 MG/DL    Bilirubin, total 0.5 0.2 - 1.1 MG/DL    ALT (SGPT) 21 12 - 65 U/L    AST (SGOT) 27 15 - 37 U/L    Alk.  phosphatase 109 50 - 136 U/L    Protein, total 7.5 6.3 - 8.2 g/dL    Albumin 3.3 3.2 - 4.6 g/dL    Globulin 4.2 (H) 2.3 - 3.5 g/dL    A-G Ratio 0.8 (L) 1.2 - 3.5     LIPASE    Collection Time: 11/14/21  5:49 PM   Result Value Ref Range    Lipase 80 73 - 393 U/L   LACTIC ACID    Collection Time: 11/14/21  5:49 PM   Result Value Ref Range    Lactic acid 3.2 (H) 0.4 - 2.0 MMOL/L   MAGNESIUM    Collection Time: 11/14/21  5:49 PM   Result Value Ref Range    Magnesium 1.9 1.8 - 2.4 mg/dL   METABOLIC PANEL, COMPREHENSIVE    Collection Time: 11/15/21  3:21 AM   Result Value Ref Range    Sodium 138 136 - 145 mmol/L    Potassium 4.5 3.5 - 5.1 mmol/L    Chloride 106 98 - 107 mmol/L    CO2 21 21 - 32 mmol/L    Anion gap 11 7 - 16 mmol/L    Glucose 52 (L) 65 - 100 mg/dL    BUN 5 (L) 8 - 23 MG/DL    Creatinine 0.66 0.6 - 1.0 MG/DL    GFR est AA >60 >60 ml/min/1.73m2    GFR est non-AA >60 >60 ml/min/1.73m2    Calcium 7.3 (L) 8.3 - 10.4 MG/DL    Bilirubin, total 0.5 0.2 - 1.1 MG/DL    ALT (SGPT) 14 12 - 65 U/L    AST (SGOT) 25 15 - 37 U/L    Alk. phosphatase 91 50 - 136 U/L    Protein, total 6.5 6.3 - 8.2 g/dL    Albumin 2.8 (L) 3.2 - 4.6 g/dL    Globulin 3.7 (H) 2.3 - 3.5 g/dL    A-G Ratio 0.8 (L) 1.2 - 3.5     CBC WITH AUTOMATED DIFF    Collection Time: 11/15/21  3:21 AM   Result Value Ref Range    WBC 6.5 4.3 - 11.1 K/uL    RBC 3.88 (L) 4.05 - 5.2 M/uL    HGB 11.8 11.7 - 15.4 g/dL    HCT 36.8 35.8 - 46.3 %    MCV 94.8 79.6 - 97.8 FL    MCH 30.4 26.1 - 32.9 PG    MCHC 32.1 31.4 - 35.0 g/dL    RDW 14.8 (H) 11.9 - 14.6 %    PLATELET 071 518 - 545 K/uL    MPV 9.4 9.4 - 12.3 FL    ABSOLUTE NRBC 0.00 0.0 - 0.2 K/uL    DF AUTOMATED      NEUTROPHILS 73 43 - 78 %    LYMPHOCYTES 18 13 - 44 %    MONOCYTES 7 4.0 - 12.0 %    EOSINOPHILS 1 0.5 - 7.8 %    BASOPHILS 1 0.0 - 2.0 %    IMMATURE GRANULOCYTES 1 0.0 - 5.0 %    ABS. NEUTROPHILS 4.8 1.7 - 8.2 K/UL    ABS. LYMPHOCYTES 1.2 0.5 - 4.6 K/UL    ABS. MONOCYTES 0.5 0.1 - 1.3 K/UL    ABS. EOSINOPHILS 0.0 0.0 - 0.8 K/UL    ABS. BASOPHILS 0.1 0.0 - 0.2 K/UL    ABS. IMM.  GRANS. 0.0 0.0 - 0.5 K/UL   T4, FREE    Collection Time: 11/15/21  3:21 AM   Result Value Ref Range    T4, Free 0.5 (L) 0.78 - 1.46 NG/DL   TSH 3RD GENERATION    Collection Time: 11/15/21  3:21 AM   Result Value Ref Range    TSH 31.000 (H) 0.358 - 3.740 uIU/mL   GLUCOSE, POC    Collection Time: 11/15/21  5:18 AM   Result Value Ref Range    Glucose (POC) 57 (L) 65 - 100 mg/dL    Performed by [address] [place] [address], POC    Collection Time: 11/15/21 5:58 AM   Result Value Ref Range    Glucose (POC) 158 (H) 65 - 100 mg/dL    Performed by [facility]    SARS-COV-2    Collection Time: 11/15/21  5:57 PM   Result Value Ref Range    SARS-CoV-2 Please find results under separate order     COVID-19 RAPID TEST    Collection Time: 11/15/21  5:57 PM   Result Value Ref Range    Specimen source Nasopharyngeal      COVID-19 rapid test Not detected NOTD     PLEASE READ & DOCUMENT PPD TEST IN 24 HRS    Collection Time: 11/16/21 12:30 AM   Result Value Ref Range    PPD Negative Negative    mm Induration 0 0 - 5 mm   CBC WITH AUTOMATED DIFF    Collection Time: 11/16/21  3:10 AM   Result Value Ref Range    WBC 4.5 4.3 - 11.1 K/uL    RBC 3.55 (L) 4.05 - 5.2 M/uL    HGB 10.6 (L) 11.7 - 15.4 g/dL    HCT 33.3 (L) 35.8 - 46.3 %    MCV 93.8 79.6 - 97.8 FL    MCH 29.9 26.1 - 32.9 PG    MCHC 31.8 31.4 - 35.0 g/dL    RDW 14.5 11.9 - 14.6 %    PLATELET 071 737 - 441 K/uL    MPV 9.6 9.4 - 12.3 FL    ABSOLUTE NRBC 0.00 0.0 - 0.2 K/uL    DF AUTOMATED      NEUTROPHILS 63 43 - 78 %    LYMPHOCYTES 23 13 - 44 %    MONOCYTES 11 4.0 - 12.0 %    EOSINOPHILS 2 0.5 - 7.8 %    BASOPHILS 1 0.0 - 2.0 %    IMMATURE GRANULOCYTES 0 0.0 - 5.0 %    ABS. NEUTROPHILS 2.9 1.7 - 8.2 K/UL    ABS. LYMPHOCYTES 1.1 0.5 - 4.6 K/UL    ABS. MONOCYTES 0.5 0.1 - 1.3 K/UL    ABS. EOSINOPHILS 0.1 0.0 - 0.8 K/UL    ABS. BASOPHILS 0.0 0.0 - 0.2 K/UL    ABS. IMM.  GRANS. 0.0 0.0 - 0.5 K/UL   METABOLIC PANEL, BASIC    Collection Time: 11/16/21  3:10 AM   Result Value Ref Range    Sodium 135 (L) 136 - 145 mmol/L    Potassium 3.9 3.5 - 5.1 mmol/L    Chloride 102 98 - 107 mmol/L    CO2 30 21 - 32 mmol/L    Anion gap 3 (L) 7 - 16 mmol/L    Glucose 148 (H) 65 - 100 mg/dL    BUN 4 (L) 8 - 23 MG/DL    Creatinine 0.69 0.6 - 1.0 MG/DL    GFR est AA >60 >60 ml/min/1.73m2    GFR est non-AA >60 >60 ml/min/1.73m2    Calcium 7.3 (L) 8.3 - 10.4 MG/DL       All Micro Results     Procedure Component Value Units Date/Time    COVID-19 RAPID TEST [[id number]] Collected: 11/15/21 1755    Order Status: Completed Specimen: Nasopharyngeal Updated: 11/15/21 1822     Specimen source Nasopharyngeal        COVID-19 rapid test Not detected        Comment:      The specimen is NEGATIVE for SARS-CoV-2, the novel coronavirus associated with COVID-19. A negative result does not rule out COVID-19. This test has been authorized by the FDA under an Emergency Use Authorization (EUA) for use by authorized laboratories. Fact sheet for Healthcare Providers: [employer]  Fact sheet for Patients: [employer]       Methodology: Isothermal Nucleic Acid Amplification         C. DIFFICILE AG & TOXIN A/B [[id number]]     Order Status: Sent Specimen: Stool           Other Studies:  CT HEAD WO CONT    Result Date: 11/16/2021  Noncontrast CT of the brain. COMPARISON: Yesterday INDICATION: Follow-up subdural hemorrhage TECHNIQUE: Contiguous axial images were obtained from the skull base through the vertex without IV contrast. Radiation dose reduction techniques were used for this study:  Our CT scanners use one or all of the following: Automated exposure control, adjustment of the mA and/or kVp according to patient's size, iterative reconstruction. FINDINGS: There is mixed density right-sided subdural collection, measuring approximately 14 mm at the maximal thickness. There is mass effect on the brain parenchyma and approximately 5 mm right-to-left midline shift. There is no hydrocephalus. Periventricular hypodensities, nonspecific and likely due to chronic small vessel changes. Cerebellum on the brainstem are grossly unremarkable. Included globes appear intact. Chronic opacification of the left maxillary sinus. Mastoid air cells are aerated. Surrounding bones are stable. 1. Mixed density right-sided subdural hemorrhage, with associated mass effect and 5 mm right-to-left midline shift.  No significant change in size when compared to CT from yesterday. CT HEAD WO CONT    Result Date: 11/15/2021  CT HEAD WITHOUT CONTRAST. INDICATION: Worsening headache and subdural hematoma. COMPARISON: Exam earlier today and yesterday's exam  TECHNIQUE:   5 mm axial scans from the skull base to the vertex. Our CT scanners use one or more of the following:  Automated exposure control, adjustment of the mA and or kV according to patient size, iterative reconstruction. FINDINGS:  Subdural fluid collection on the right with small amount of high attenuation remains. Partial effacement of the right lateral ventricle. Mild, about 5 mm leftward midline shift is similar. Posterior fossa: Unremarkable. Included portion of the: Paranasal sinuses and mastoid air cells demonstrate opacified left maxillary sinus. Globes and orbits grossly also unremarkable. Stable mixed attenuation, right subdural hematoma.        Current Meds:  Current Facility-Administered Medications   Medication Dose Route Frequency    amLODIPine (NORVASC) tablet 5 mg  5 mg Oral QPM    [Held by provider] carvediloL (COREG) tablet 25 mg  25 mg Oral BID WITH MEALS    cholecalciferol (VITAMIN D3) (1000 Units /25 mcg) tablet 2,000 Units  2,000 Units Oral DAILY    cholestyramine-aspartame (QUESTRAN LIGHT) packet 4 g  4 g Oral BID WITH MEALS    cyanocobalamin tablet 500 mcg  500 mcg Oral 7am    losartan (COZAAR) tablet 100 mg  100 mg Oral DAILY    magnesium oxide (MAG-OX) tablet 400 mg  400 mg Oral BID    thyroid (Pork) (ARMOUR) tablet 180 mg  180 mg Oral DAILY    sodium chloride (NS) flush 5-40 mL  5-40 mL IntraVENous Q8H    sodium chloride (NS) flush 5-40 mL  5-40 mL IntraVENous PRN    acetaminophen (TYLENOL) tablet 650 mg  650 mg Oral Q6H PRN    Or    acetaminophen (TYLENOL) suppository 650 mg  650 mg Rectal Q6H PRN    polyethylene glycol (MIRALAX) packet 17 g  17 g Oral DAILY PRN    sucralfate (CARAFATE) tablet 1 g  1 g Oral AC&HS    hydrALAZINE (APRESOLINE) 20 mg/mL injection 20 mg  20 mg IntraVENous Q6H PRN    hydroCHLOROthiazide (HYDRODIURIL) tablet 25 mg  25 mg Oral DAILY    dextrose 40% (GLUTOSE) oral gel 1 Tube  15 g Oral PRN    glucagon (GLUCAGEN) injection 1 mg  1 mg IntraMUSCular PRN    dextrose (D50W) injection syrg 12.5-25 g  25-50 mL IntraVENous PRN    dextrose 5 % - 0.45% NaCl infusion  75 mL/hr IntraVENous CONTINUOUS    levETIRAcetam (KEPPRA) tablet 500 mg  500 mg Oral BID    pantoprazole (PROTONIX) 40 mg in 0.9% sodium chloride 10 mL injection  40 mg IntraVENous Q12H    sodium chloride (NS) flush 5-10 mL  5-10 mL IntraVENous Q8H    sodium chloride (NS) flush 5-10 mL  5-10 mL IntraVENous PRN    morphine injection 1 mg  1 mg IntraVENous Q4H PRN    ondansetron (ZOFRAN) injection 4 mg  4 mg IntraVENous Q4H PRN       Signed:  Luly Elaine MD    Part of this note may have been written by using a voice dictation software. The note has been proof read but may still contain some grammatical/other typographical errors.

## 2021-11-16 NOTE — INTERVAL H&P NOTE
Update History & Physical    The Patient's History and Physical of November 15, 2021 was reviewed with the patient and I examined the patient. There was no change. The surgical site was confirmed by the patient and me. Plan:  The risk, benefits, expected outcome, and alternative to the recommended procedure have been discussed with the patient. Patient understands and wants to proceed with the procedure.     Electronically signed by Genie Lyn MD on 11/16/2021 at 10:33 AM

## 2021-11-16 NOTE — PROGRESS NOTES
This is a follow-up visit to the patient, providing a spiritual presence, emotional support and prayer. The patient appears to be resting.     Chinmay Bell, [address] [facility], [facility]

## 2021-11-16 NOTE — PROGRESS NOTES
Neurosurgery    Repeat CT brain scanning is unchanged. Minimal shift superior nature. Mild ventricular effacement. Assessment/plan: Repeat CT brain scan in 7 to 10 days with follow-up to my office after. Avoid antiplatelet and anticoagulant agents.     Neftali Perera MD

## 2021-11-16 NOTE — PROGRESS NOTES
Pt will go directly to fluoro room    Verbal report received from [facility] [place] [facility] [employer]  being received from 3105 for ordered procedure      Report consisted of patients Situation, Background, Assessment and   Recommendations(SBAR). Information from the following report(s) SBAR, Intake/Output, MAR, Recent Results, Med Rec Status and Pre Procedure Checklist was reviewed with the receiving nurse. Opportunity for questions and clarification was provided.

## 2021-11-16 NOTE — PROGRESS NOTES
Bedside shift change report given to Deleta Anders (oncoming nurse) by Maureen Mcclelland RN (offgoing nurse). Report included the following information SBAR, Kardex, ED Summary, Intake/Output, MAR, Recent Results and Cardiac Rhythm SB/SR.     Dual neuro check and [facility] performed

## 2021-11-16 NOTE — PROCEDURES
ESOPHAGOGASTRODUODENOSCOPY    DATE of PROCEDURE:   11/16/2021    PRE-OP DIAGNOSIS:  1. Epigastric abdominal pain (improved, per patient)  2. Nausea/vomiting (improved, per patient)  3. Chronic diarrhea (improved, per patient)     POST-OP DIAGNOSIS:  1. Hiatal hernia  2. Gastric ulcers    MEDICATIONS ADMINISTERED:   MAC per anesthesia    INSTRUMENT:   [id number]    PROCEDURE:    After obtaining informed consent, the patient was placed in the left lateral position and sedated. The endoscope was advanced to the 2nd portion of the duodenum under direct vision without difficulty. The esophagus, stomach (including retroflexed views) and duodenum were evaluated. The patient was taken to the recovery area in stable condition. FINDINGS:  ESOPHAGUS: Normal proximal, mid, and distal esophagus. STOMACH: The flap valve is considered Hill grade III on retroflexed view. A 2 cm sliding-type hiatal hernia without Manolo ulcers was noted with Z line located at 38 cm and diaphragmatic hiatus 40 cm from the incisors. A 5-6 mm clean based ulcer was noted in the antrum with several smaller clean based ulcers nearby. Otherwise normal gastric mucosa throughout. Random gastric biopsies were obtained. DUODENUM: Normal bulb and 2nd portion. Duodenal biopsies were obtained.      ESTIMATED BLOOD LOSS:  Minimal.    PLAN:  - F/u path, will treat H pylori if positive  - Continue Pantoprazole 40 mg IV qday, may change to PO ACB when able to tolerate PO  - Resume bland diet once okay with primary  - Avoid NSAIDs!  - F/u fecal elastase  - Continue Questran  - Repeat EGD in 8-12 weeks      Td Nicholas MD  [facility]

## 2021-11-17 LAB
ANION GAP SERPL CALC-SCNC: 5 MMOL/L (ref 7–16)
BASOPHILS # BLD: 0 K/UL (ref 0–0.2)
BASOPHILS NFR BLD: 1 % (ref 0–2)
BUN SERPL-MCNC: 2 MG/DL (ref 8–23)
CALCIUM SERPL-MCNC: 7.4 MG/DL (ref 8.3–10.4)
CHLORIDE SERPL-SCNC: 102 MMOL/L (ref 98–107)
CO2 SERPL-SCNC: 28 MMOL/L (ref 21–32)
CREAT SERPL-MCNC: 0.61 MG/DL (ref 0.6–1)
DIFFERENTIAL METHOD BLD: ABNORMAL
EOSINOPHIL # BLD: 0.2 K/UL (ref 0–0.8)
EOSINOPHIL NFR BLD: 4 % (ref 0.5–7.8)
ERYTHROCYTE [DISTWIDTH] IN BLOOD BY AUTOMATED COUNT: 14.3 % (ref 11.9–14.6)
GLUCOSE SERPL-MCNC: 103 MG/DL (ref 65–100)
HCT VFR BLD AUTO: 36.9 % (ref 35.8–46.3)
HGB BLD-MCNC: 12 G/DL (ref 11.7–15.4)
IMM GRANULOCYTES # BLD AUTO: 0 K/UL (ref 0–0.5)
IMM GRANULOCYTES NFR BLD AUTO: 1 % (ref 0–5)
LYMPHOCYTES # BLD: 1 K/UL (ref 0.5–4.6)
LYMPHOCYTES NFR BLD: 23 % (ref 13–44)
MCH RBC QN AUTO: 31.1 PG (ref 26.1–32.9)
MCHC RBC AUTO-ENTMCNC: 32.5 G/DL (ref 31.4–35)
MCV RBC AUTO: 95.6 FL (ref 79.6–97.8)
MM INDURATION POC: 0 MM (ref 0–5)
MONOCYTES # BLD: 0.5 K/UL (ref 0.1–1.3)
MONOCYTES NFR BLD: 11 % (ref 4–12)
NEUTS SEG # BLD: 2.6 K/UL (ref 1.7–8.2)
NEUTS SEG NFR BLD: 62 % (ref 43–78)
NRBC # BLD: 0 K/UL (ref 0–0.2)
PLATELET # BLD AUTO: 169 K/UL (ref 150–450)
PMV BLD AUTO: 9.7 FL (ref 9.4–12.3)
POTASSIUM SERPL-SCNC: 3.3 MMOL/L (ref 3.5–5.1)
PPD POC: NEGATIVE NEGATIVE
RBC # BLD AUTO: 3.86 M/UL (ref 4.05–5.2)
SODIUM SERPL-SCNC: 135 MMOL/L (ref 136–145)
WBC # BLD AUTO: 4.3 K/UL (ref 4.3–11.1)

## 2021-11-17 PROCEDURE — 74011250636 HC RX REV CODE- 250/636: Performed by: INTERNAL MEDICINE

## 2021-11-17 PROCEDURE — 74011250637 HC RX REV CODE- 250/637: Performed by: NURSE PRACTITIONER

## 2021-11-17 PROCEDURE — 36415 COLL VENOUS BLD VENIPUNCTURE: CPT

## 2021-11-17 PROCEDURE — 85025 COMPLETE CBC W/AUTO DIFF WBC: CPT

## 2021-11-17 PROCEDURE — 80048 BASIC METABOLIC PNL TOTAL CA: CPT

## 2021-11-17 PROCEDURE — 74011250636 HC RX REV CODE- 250/636: Performed by: STUDENT IN AN ORGANIZED HEALTH CARE EDUCATION/TRAINING PROGRAM

## 2021-11-17 PROCEDURE — 65270000029 HC RM PRIVATE

## 2021-11-17 PROCEDURE — 97530 THERAPEUTIC ACTIVITIES: CPT

## 2021-11-17 PROCEDURE — 74011000250 HC RX REV CODE- 250: Performed by: STUDENT IN AN ORGANIZED HEALTH CARE EDUCATION/TRAINING PROGRAM

## 2021-11-17 PROCEDURE — 74011250637 HC RX REV CODE- 250/637: Performed by: INTERNAL MEDICINE

## 2021-11-17 PROCEDURE — C9113 INJ PANTOPRAZOLE SODIUM, VIA: HCPCS | Performed by: STUDENT IN AN ORGANIZED HEALTH CARE EDUCATION/TRAINING PROGRAM

## 2021-11-17 PROCEDURE — 74011250636 HC RX REV CODE- 250/636: Performed by: FAMILY MEDICINE

## 2021-11-17 RX ORDER — POTASSIUM CHLORIDE 20 MEQ/1
40 TABLET, EXTENDED RELEASE ORAL
Status: COMPLETED | OUTPATIENT
Start: 2021-11-17 | End: 2021-11-17

## 2021-11-17 RX ADMIN — CHOLESTYRAMINE 4 G: 4 POWDER, FOR SUSPENSION ORAL at 16:46

## 2021-11-17 RX ADMIN — CHOLESTYRAMINE 4 G: 4 POWDER, FOR SUSPENSION ORAL at 09:02

## 2021-11-17 RX ADMIN — MORPHINE SULFATE 1 MG: 2 INJECTION, SOLUTION INTRAMUSCULAR; INTRAVENOUS at 04:02

## 2021-11-17 RX ADMIN — LEVOTHYROXINE, LIOTHYRONINE 180 MG: 38; 9 TABLET ORAL at 09:00

## 2021-11-17 RX ADMIN — ONDANSETRON 4 MG: 2 INJECTION INTRAMUSCULAR; INTRAVENOUS at 13:12

## 2021-11-17 RX ADMIN — Medication 400 MG: at 17:41

## 2021-11-17 RX ADMIN — MORPHINE SULFATE 1 MG: 2 INJECTION, SOLUTION INTRAMUSCULAR; INTRAVENOUS at 13:12

## 2021-11-17 RX ADMIN — AMLODIPINE BESYLATE 5 MG: 5 TABLET ORAL at 17:41

## 2021-11-17 RX ADMIN — Medication 10 ML: at 21:52

## 2021-11-17 RX ADMIN — SODIUM CHLORIDE 40 MG: 9 INJECTION INTRAMUSCULAR; INTRAVENOUS; SUBCUTANEOUS at 21:52

## 2021-11-17 RX ADMIN — SUCRALFATE 1 G: 1 TABLET ORAL at 05:23

## 2021-11-17 RX ADMIN — Medication 10 ML: at 14:35

## 2021-11-17 RX ADMIN — MORPHINE SULFATE 1 MG: 2 INJECTION, SOLUTION INTRAMUSCULAR; INTRAVENOUS at 08:58

## 2021-11-17 RX ADMIN — ONDANSETRON 4 MG: 2 INJECTION INTRAMUSCULAR; INTRAVENOUS at 17:51

## 2021-11-17 RX ADMIN — LEVETIRACETAM 500 MG: 500 TABLET, FILM COATED ORAL at 17:41

## 2021-11-17 RX ADMIN — POTASSIUM CHLORIDE 40 MEQ: 20 TABLET, EXTENDED RELEASE ORAL at 09:02

## 2021-11-17 RX ADMIN — VITAMIN D, TAB 1000IU (100/BT) 2000 UNITS: 25 TAB at 09:00

## 2021-11-17 RX ADMIN — LEVETIRACETAM 500 MG: 500 TABLET, FILM COATED ORAL at 09:01

## 2021-11-17 RX ADMIN — SODIUM CHLORIDE 40 MG: 9 INJECTION INTRAMUSCULAR; INTRAVENOUS; SUBCUTANEOUS at 09:02

## 2021-11-17 RX ADMIN — Medication 10 ML: at 05:24

## 2021-11-17 RX ADMIN — MORPHINE SULFATE 1 MG: 2 INJECTION, SOLUTION INTRAMUSCULAR; INTRAVENOUS at 17:45

## 2021-11-17 RX ADMIN — SUCRALFATE 1 G: 1 TABLET ORAL at 16:46

## 2021-11-17 RX ADMIN — Medication 400 MG: at 09:02

## 2021-11-17 RX ADMIN — ONDANSETRON 4 MG: 2 INJECTION INTRAMUSCULAR; INTRAVENOUS at 09:04

## 2021-11-17 RX ADMIN — SUCRALFATE 1 G: 1 TABLET ORAL at 21:52

## 2021-11-17 RX ADMIN — SUCRALFATE 1 G: 1 TABLET ORAL at 12:37

## 2021-11-17 RX ADMIN — CYANOCOBALAMIN TAB 1000 MCG 500 MCG: 1000 TAB at 05:23

## 2021-11-17 RX ADMIN — ONDANSETRON 4 MG: 2 INJECTION INTRAMUSCULAR; INTRAVENOUS at 21:58

## 2021-11-17 RX ADMIN — MORPHINE SULFATE 1 MG: 2 INJECTION, SOLUTION INTRAMUSCULAR; INTRAVENOUS at 21:58

## 2021-11-17 NOTE — PROGRESS NOTES
11/16/21 1069   NIH Stroke Scale   Interval Handoff/Transfer  (dual )   Level of Conciousness (1a) 0   LOC Questions (1b) 0   LOC Commands (1c) 0   Best Gaze (2) 0   Visual (3) 0   Facial Palsy (4) 0   Motor Arm, Left (5a) 0   Motor Arm, Right (5b) 0   Motor Leg, Left (6a) 0   Motor Leg, Right (6b) 0   Limb Ataxia (7) 0   Sensory (8) 0   Best Language (9) 0   Dysarthria (10) 0   Extinction and Inattention (11) 0   Total 0

## 2021-11-17 NOTE — PROGRESS NOTES
Date of Outreach Update:  Queen Elliott was seen and assessed. Pt alert and oriented, fernandes, NIH 0. Pt denies needs but does voice having a headache that has been ongoing since admission. D/w primary nurse [facility] who denies concerns or questions at this time. No stool collected for cdiff lab, last BM was 11/13. MEWS Score: 1 (11/17/21 1100)  Vitals:    11/17/21 0000 11/17/21 0358 11/17/21 0730 11/17/21 1100   BP: (!) 144/87 133/76 (!) 93/57 103/71   Pulse: 69 73 72 74   Resp: 20 18 19 15   Temp: 98.3 °F (36.8 °C) 98.5 °F (36.9 °C) 98.1 °F (36.7 °C) 99.7 °F (37.6 °C)   SpO2: 95% 93% 95% 94%   Weight:       Height:             Pain Assessment  Pain Intensity 1: 9 (11/17/21 0850)  Pain Location 1: Head, Abdomen  Pain Intervention(s) 1: Medication (see MAR)  Patient Stated Pain Goal: 0      Previous Outreach assessment has been reviewed. There have been no significant clinical changes since the completion of the last dated Outreach assessment. Will continue to follow up per outreach protocol.     Signed By:   Nathan Rothman RN    November 17, 2021 12:10 PM

## 2021-11-17 NOTE — PROGRESS NOTES
11/16/21 1015   Dual Skin Pressure Injury Assessment   Dual Skin Pressure Injury Assessment WDL   Second Care Provider (Based on [address] [place] [address]) BAM, RN   Skin Integumentary   Skin Integumentary (WDL) X   Skin Color Ecchymosis (comment)  (scattered face arms)   Skin Integrity Intact    Pressure  Injury Documentation No Pressure Injury Noted-Pressure Ulcer Prevention Initiated

## 2021-11-17 NOTE — PROGRESS NOTES
Date of Outreach Update:  Neel Barnett was seen and assessed. Pt sitting in chair with family around. Neuro assessment completed, NIH 0.  Primary nurse Adriano Zavala at bedside. MEWS Score: 1 (11/17/21 1600)  Vitals:    11/17/21 0358 11/17/21 0730 11/17/21 1100 11/17/21 1600   BP: 133/76 (!) 93/57 103/71 115/74   Pulse: 73 72 74 73   Resp: 18 19 15 15   Temp: 98.5 °F (36.9 °C) 98.1 °F (36.7 °C) 99.7 °F (37.6 °C) 98.3 °F (36.8 °C)   SpO2: 93% 95% 94% 98%   Weight:       Height:             Pain Assessment  Pain Intensity 1: 10 (11/17/21 1500)  Pain Location 1: Head  Pain Intervention(s) 1: Medication (see MAR)  Patient Stated Pain Goal: 0      Previous Outreach assessment has been reviewed. There have been no significant clinical changes since the completion of the last dated Outreach assessment. Will continue to follow up per outreach protocol.     Signed By:   Chaitanya Harris RN    November 17, 2021 4:51 PM

## 2021-11-17 NOTE — PROGRESS NOTES
TRANSFER - OUT REPORT:    Verbal report given to [address], RN(name) on [facility]  being transferred to [facility](unit) for routine progression of care       Report consisted of patients Situation, Background, Assessment and   Recommendations(SBAR). Information from the following report(s) SBAR, Intake/Output, MAR and Recent Results was reviewed with the receiving nurse. Lines:   Peripheral IV 11/14/21 Left; Posterior Forearm (Active)   Site Assessment Clean, dry, & intact 11/16/21 2256   Phlebitis Assessment 0 11/16/21 2256   Infiltration Assessment 0 11/16/21 2256   Dressing Status Clean, dry, & intact 11/16/21 2256   Dressing Type Transparent; Tape 11/16/21 2256   Hub Color/Line Status Patent 11/16/21 2256   Alcohol Cap Used No 11/16/21 1903       Peripheral IV 11/14/21 Anterior; Left; Proximal Forearm (Active)   Site Assessment Clean, dry, & intact 11/16/21 2256   Phlebitis Assessment 0 11/16/21 2256   Infiltration Assessment 0 11/16/21 2256   Dressing Status Clean, dry, & intact 11/16/21 2256   Dressing Type Transparent; Tape 11/16/21 2256   Hub Color/Line Status Infusing 11/16/21 2256   Alcohol Cap Used No 11/16/21 1903        Opportunity for questions and clarification was provided.       Patient transported with:   Registered Nurse

## 2021-11-17 NOTE — PROGRESS NOTES
Problem: Risk for Spread of Infection  Goal: Prevent transmission of infectious organism to others  Description: Prevent the transmission of infectious organisms to other patients, staff members, and visitors. Outcome: Progressing Towards Goal     Problem: Patient Education:  Go to Education Activity  Goal: Patient/Family Education  Outcome: Progressing Towards Goal     Problem: Falls - Risk of  Goal: *Absence of Falls  Description: Document Howie Santos Fall Risk and appropriate interventions in the flowsheet. Outcome: Progressing Towards Goal  Note: Fall Risk Interventions:  Mobility Interventions: Communicate number of staff needed for ambulation/transfer, Patient to call before getting OOB         Medication Interventions: Patient to call before getting OOB, Teach patient to arise slowly    Elimination Interventions: Call light in reach, Patient to call for help with toileting needs    History of Falls Interventions: Door open when patient unattended, Investigate reason for fall         Problem: Patient Education: Go to Patient Education Activity  Goal: Patient/Family Education  Outcome: Progressing Towards Goal     Problem: Patient Education: Go to Patient Education Activity  Goal: Patient/Family Education  Outcome: Progressing Towards Goal     Problem: Patient Education: Go to Patient Education Activity  Goal: Patient/Family Education  Outcome: Progressing Towards Goal     Problem: Pressure Injury - Risk of  Goal: *Prevention of pressure injury  Description: Document Rusty Scale and appropriate interventions in the flowsheet.   Outcome: Progressing Towards Goal  Note: Pressure Injury Interventions:       Moisture Interventions: Absorbent underpads, Internal/External urinary devices    Activity Interventions: Pressure redistribution bed/mattress(bed type), PT/OT evaluation    Mobility Interventions: HOB 30 degrees or less, Pressure redistribution bed/mattress (bed type)    Nutrition Interventions: Document food/fluid/supplement intake    Friction and Shear Interventions: Apply protective barrier, creams and emollients, HOB 30 degrees or less                Problem: Patient Education: Go to Patient Education Activity  Goal: Patient/Family Education  Outcome: Progressing Towards Goal

## 2021-11-17 NOTE — PROGRESS NOTES
Hospitalist Progress Note   Admit Date:  2021  5:28 PM   Name:  Juarez Linda   Age:  78 y.o. Sex:  female  :  1945   MRN:  [id number]   Room:  9/    Presenting Complaint: Abdominal Pain    Reason(s) for Admission: Subdural hematoma [facility]) [facility] Course & Interval History:   Leonor Hatchet is a 78year old CFM with a PMH of chronic pancreatitis, conversion disorder, nephrolithiasis, GERD, PUD, TIA, IBS admitted with recurrent nausea/ abd pain/ diarrhea with recent fall. Patient reported a fall ~ 2 weeks PTA from a bar chair at home at which time she landed face first on the floor causing bruising and knocking out 2 teeth. Reports she did not seek medical attention because she was embarrassed with her appearance but endorsed continued, severe HA unrelieved with NSAIDs. Her son finally convinced her to come to ER. In the ER CT Head showed acute/chronic right SDH with mild right to left midline shift of 6 mm with left maxillary sinus fractures and a non displaced left orbital floor fracture. Neurology consulted with no surgical needs found. ENT consulted and injuries non operable. Keppra added for seizure prophylaxis        Subjective (21): Complaint of continued HA that does respond to prn analgesia, abdominal pain, nausea. Denies vomiting, CP    Assessment & Plan:      Active Problems:  Subdural hematoma ([address] (2021)  Left Orbital floor fracture   Left maxillary sinus fractures  · BP goals < 140/90   · PT,OT with recs for HH/OP therapy   · Continued keppra   · Neurosurgery with recs for repeat CT Brain in 7-10 days and office f/u afterwards  · Fracture non operable; prn analgesia         Active Problems:    Hypothyroidism (2013)  ·  [place] thyroid adjusted; Routine monitoring         GERD (gastroesophageal reflux disease) (2013)  Abdominal Pain  Peptic ulcer disease (2016)  Chronic diarrhea (11/15/2021)  Chronic pancreatitis  Chronic nausea and emesis     ·        GI with recs to change to oral protonix ACB/S x 14 days then ACB therafterwhen patient                            tolerating orals  ·         Repeat EGD in 8-12 weeks; OP GI follow-up  ·         Continue Questran and Carafate  ·        - EGD showing a 2 cm sliding-type hiatal hernia without Manolo ulcers was noted with Z line   l         located at 38 cm and diaphragmatic hiatus 40 cm from the incisors                - 5-6 cm clean based ulcer in the antrum with several small ulcers nearby                 -Biopsies pending           Essential hypertension (2013)  ·   Continued norvasc, cozaar  ·   Hold  coreg due to bradycardia and intermittent hypotension          Dyslipidemia (2014)  ·           Noted              Hypoglycemia:  ·      Glucose levels > 100  ·      Continue IVF D51/2NS until patient eating        Anemia:  · Hgb 10.6; most likely dilutional; Hgb normal on admit       Dispo/Discharge Plannin-48 hours    Diet:  ADULT DIET Clear Liquid  DVT PPx: SCDs  Code status: Full Code    Hospital Problems as of 2021 Date Reviewed: 7/15/2021          Codes Class Noted - Resolved POA    Chronic diarrhea ICD-10-CM: K52.9  ICD-9-CM: 787.91  11/15/2021 - Present Unknown        Orbital floor fracture ([facility] [address] ICD-10-CM: S02.30XA  ICD-9-CM: 802.6  11/15/2021 - Present Yes        Maxillary sinus fracture ([facility] [address] ICD-10-CM: S02.401A  ICD-9-CM: 802.4  11/15/2021 - Present Yes        Subdural hematoma ([facility] [address] ICD-10-CM: P93.7L0B  ICD-9-CM: 432.1  2021 - Present Unknown        Abdominal pain ICD-10-CM: R10.9  ICD-9-CM: 789.00  2021 - Present Unknown        Peptic ulcer disease ICD-10-CM: K27.9  ICD-9-CM: 533.90  2016 - Present Unknown    Overview Signed 2016  1:45 PM by Holley Sandhoff, Ricale A 1. EGD May 2015 with several ulcers.               Dyslipidemia (Chronic) ICD-10-CM: E78.5  ICD-9-CM: 272.4  2014 - Present Yes        Hypothyroidism ICD-10-CM: E03.9  ICD-9-CM: 244.9  9/30/2013 - Present Yes        GERD (gastroesophageal reflux disease) (Chronic) ICD-10-CM: K21.9  ICD-9-CM: 530.81  9/30/2013 - Present Yes    Overview Signed 11/1/2013  4:43 PM by James Mederos NP     S/p Nissen Fundoplication             Essential hypertension ICD-10-CM: I10  ICD-9-CM: 401.9  9/30/2013 - Present Yes    Overview Signed 8/11/2016  1:44 PM by Byron Sotelo     1. Echo (5/20/14) : Normal LV systolic function. EF 55-60%. Normal diastolic function. RVSP 40-45. Mild to moderate tricuspid regurgitation. Mild mitral regurgitation. 2.  Renal artery US (7/8/14): No evidence of renal artery stenosis. 3.  Plasma Metanephrine. (6/25/14): Normal  4. Echo (11/14):  EF 52-09%  Grade 1 diastolic dysfunction. Mild mitral regurgitaiton.                     Objective:     Patient Vitals for the past 24 hrs:   Temp Pulse Resp BP SpO2   11/17/21 1100 99.7 °F (37.6 °C) 74 15 103/71 94 %   11/17/21 0730 98.1 °F (36.7 °C) 72 19 (!) 93/57 95 %   11/17/21 0358 98.5 °F (36.9 °C) 73 18 133/76 93 %   11/17/21 0000 98.3 °F (36.8 °C) 69 20 (!) 144/87 95 %   11/16/21 2259 98.5 °F (36.9 °C) 70 22 133/87 95 %   11/16/21 2235  69 23 (!) 119/58 94 %   11/16/21 2202  69 25 (!) 121/58 94 %   11/16/21 2132  68 20 127/60 95 %   11/16/21 2102  66 17 116/68 96 %   11/16/21 2032  63 19 120/63 95 %   11/16/21 2003  63 21 131/63 100 %   11/16/21 2000  66      11/16/21 1907  63 21 (!) 115/56 97 %   11/16/21 1903 98.2 °F (36.8 °C) 65 24 (!) 135/59 98 %   11/16/21 1845  66  119/60 97 %   11/16/21 1830  62 25 (!) 115/57 94 %   11/16/21 1815  65  (!) 111/55 96 %   11/16/21 1800  64 22 113/64 99 %   11/16/21 1745  65 19 (!) 121/57 97 %   11/16/21 1730  68 23 106/61 96 %   11/16/21 1715  65 25 131/66 96 %   11/16/21 1700  62  (!) 128/57 95 %   11/16/21 1645  63  137/61 94 %   11/16/21 1630  (!) 58  (!) 106/59 92 %   11/16/21 1615  (!) 58  (!) 111/58 93 %   11/16/21 1600  (!) 59  (!) 106/59 92 %   11/16/21 1545  61  118/60 91 %   11/16/21 1530  60  (!) 106/57 90 %   11/16/21 1515 98.3 °F (36.8 °C) 61 29 107/61 96 %   11/16/21 1500  60 21 (!) 116/59 98 %   11/16/21 1445  67  138/60    11/16/21 1430  64  (!) 114/57 99 %   11/16/21 1415  62  (!) 115/57 94 %     Oxygen Therapy  O2 Sat (%): 94 % (11/17/21 1100)  Pulse via Oximetry: 69 beats per minute (11/16/21 2235)  O2 Device: None (Room air) (11/16/21 1903)  Skin Assessment: Clean, dry, & intact (11/15/21 2302)  O2 Flow Rate (L/min): 3 l/min (11/16/21 1043)    Estimated body mass index is 22.86 kg/m² as calculated from the following:    Height as of this encounter: 5' 2.5\" (1.588 m). Weight as of this encounter: 57.6 kg (126 lb 15.8 oz). Intake/Output Summary (Last 24 hours) at 11/17/2021 1400  Last data filed at 11/17/2021 0358  Gross per 24 hour   Intake 1414 ml   Output 2430 ml   Net -1016 ml         Physical Exam:     Blood pressure 103/71, pulse 74, temperature 99.7 °F (37.6 °C), resp. rate 15, height 5' 2.5\" (1.588 m), weight 57.6 kg (126 lb 15.8 oz), SpO2 94 %, not currently breastfeeding. General:    No overt distress  Head:  Normocephalic, atraumatic  Eyes:  Sclerae appear normal.  Pupils equally round. ENT:  Nares appear normal, no drainage. Moist oral mucosa  Neck:  No restricted ROM. Trachea midline   CV:   RRR. No m/r/g. No jugular venous distension. Lungs:   CTAB. No wheezing, rhonchi, or rales. Respirations even, unlabored  Abdomen: Bowel sounds present. Soft, nontender, nondistended. Extremities: No cyanosis or clubbing. No edema  Skin:     No rashes and normal coloration. Warm and dry. Neuro:  CN II-XII grossly intact. A&Ox3  Psych:  Normal mood and affect.       I have reviewed ordered lab tests and independently visualized imaging below:    Recent Labs:  Recent Results (from the past 48 hour(s))   SARS-COV-2    Collection Time: 11/15/21  5:57 PM   Result Value Ref Range    SARS-CoV-2 Please find results under separate order     COVID-19 RAPID TEST    Collection Time: 11/15/21  5:57 PM   Result Value Ref Range    Specimen source Nasopharyngeal      COVID-19 rapid test Not detected NOTD     PLEASE READ & DOCUMENT PPD TEST IN 24 HRS    Collection Time: 11/16/21 12:30 AM   Result Value Ref Range    PPD Negative Negative    mm Induration 0 0 - 5 mm   CBC WITH AUTOMATED DIFF    Collection Time: 11/16/21  3:10 AM   Result Value Ref Range    WBC 4.5 4.3 - 11.1 K/uL    RBC 3.55 (L) 4.05 - 5.2 M/uL    HGB 10.6 (L) 11.7 - 15.4 g/dL    HCT 33.3 (L) 35.8 - 46.3 %    MCV 93.8 79.6 - 97.8 FL    MCH 29.9 26.1 - 32.9 PG    MCHC 31.8 31.4 - 35.0 g/dL    RDW 14.5 11.9 - 14.6 %    PLATELET 602 755 - 996 K/uL    MPV 9.6 9.4 - 12.3 FL    ABSOLUTE NRBC 0.00 0.0 - 0.2 K/uL    DF AUTOMATED      NEUTROPHILS 63 43 - 78 %    LYMPHOCYTES 23 13 - 44 %    MONOCYTES 11 4.0 - 12.0 %    EOSINOPHILS 2 0.5 - 7.8 %    BASOPHILS 1 0.0 - 2.0 %    IMMATURE GRANULOCYTES 0 0.0 - 5.0 %    ABS. NEUTROPHILS 2.9 1.7 - 8.2 K/UL    ABS. LYMPHOCYTES 1.1 0.5 - 4.6 K/UL    ABS. MONOCYTES 0.5 0.1 - 1.3 K/UL    ABS. EOSINOPHILS 0.1 0.0 - 0.8 K/UL    ABS. BASOPHILS 0.0 0.0 - 0.2 K/UL    ABS. IMM.  GRANS. 0.0 0.0 - 0.5 K/UL   METABOLIC PANEL, BASIC    Collection Time: 11/16/21  3:10 AM   Result Value Ref Range    Sodium 135 (L) 136 - 145 mmol/L    Potassium 3.9 3.5 - 5.1 mmol/L    Chloride 102 98 - 107 mmol/L    CO2 30 21 - 32 mmol/L    Anion gap 3 (L) 7 - 16 mmol/L    Glucose 148 (H) 65 - 100 mg/dL    BUN 4 (L) 8 - 23 MG/DL    Creatinine 0.69 0.6 - 1.0 MG/DL    GFR est AA >60 >60 ml/min/1.73m2    GFR est non-AA >60 >60 ml/min/1.73m2    Calcium 7.3 (L) 8.3 - 10.4 MG/DL   CBC WITH AUTOMATED DIFF    Collection Time: 11/17/21  5:39 AM   Result Value Ref Range    WBC 4.3 4.3 - 11.1 K/uL    RBC 3.86 (L) 4.05 - 5.2 M/uL    HGB 12.0 11.7 - 15.4 g/dL    HCT 36.9 35.8 - 46.3 %    MCV 95.6 79.6 - 97.8 FL    MCH 31.1 26.1 - 32.9 PG    MCHC 32.5 31.4 - 35.0 g/dL RDW 14.3 11.9 - 14.6 %    PLATELET 009 933 - 583 K/uL    MPV 9.7 9.4 - 12.3 FL    ABSOLUTE NRBC 0.00 0.0 - 0.2 K/uL    DF AUTOMATED      NEUTROPHILS 62 43 - 78 %    LYMPHOCYTES 23 13 - 44 %    MONOCYTES 11 4.0 - 12.0 %    EOSINOPHILS 4 0.5 - 7.8 %    BASOPHILS 1 0.0 - 2.0 %    IMMATURE GRANULOCYTES 1 0.0 - 5.0 %    ABS. NEUTROPHILS 2.6 1.7 - 8.2 K/UL    ABS. LYMPHOCYTES 1.0 0.5 - 4.6 K/UL    ABS. MONOCYTES 0.5 0.1 - 1.3 K/UL    ABS. EOSINOPHILS 0.2 0.0 - 0.8 K/UL    ABS. BASOPHILS 0.0 0.0 - 0.2 K/UL    ABS. IMM. GRANS. 0.0 0.0 - 0.5 K/UL   METABOLIC PANEL, BASIC    Collection Time: 11/17/21  5:39 AM   Result Value Ref Range    Sodium 135 (L) 136 - 145 mmol/L    Potassium 3.3 (L) 3.5 - 5.1 mmol/L    Chloride 102 98 - 107 mmol/L    CO2 28 21 - 32 mmol/L    Anion gap 5 (L) 7 - 16 mmol/L    Glucose 103 (H) 65 - 100 mg/dL    BUN 2 (L) 8 - 23 MG/DL    Creatinine 0.61 0.6 - 1.0 MG/DL    GFR est AA >60 >60 ml/min/1.73m2    GFR est non-AA >60 >60 ml/min/1.73m2    Calcium 7.4 (L) 8.3 - 10.4 MG/DL   PLEASE READ & DOCUMENT PPD TEST IN 48 HRS    Collection Time: 11/17/21  5:41 AM   Result Value Ref Range    PPD Negative Negative    mm Induration 0 0 - 5 mm       All Micro Results     Procedure Component Value Units Date/Time    COVID-19 RAPID TEST [[id number]] Collected: 11/15/21 0608    Order Status: Completed Specimen: Nasopharyngeal Updated: 11/15/21 1822     Specimen source Nasopharyngeal        COVID-19 rapid test Not detected        Comment:      The specimen is NEGATIVE for SARS-CoV-2, the novel coronavirus associated with COVID-19. A negative result does not rule out COVID-19. This test has been authorized by the FDA under an Emergency Use Authorization (EUA) for use by authorized laboratories.         Fact sheet for Healthcare Providers: iTendency.uy  Fact sheet for Patients: iTendency.uy       Methodology: Isothermal Nucleic Acid Amplification         C. DIFFICILE AG & TOXIN A/B [[id number]]     Order Status: Sent Specimen: Stool           Other Studies:  No results found.     Current Meds:  Current Facility-Administered Medications   Medication Dose Route Frequency    amLODIPine (NORVASC) tablet 5 mg  5 mg Oral QPM    [Held by provider] carvediloL (COREG) tablet 25 mg  25 mg Oral BID WITH MEALS    cholecalciferol (VITAMIN D3) (1000 Units /25 mcg) tablet 2,000 Units  2,000 Units Oral DAILY    cholestyramine-aspartame (QUESTRAN LIGHT) packet 4 g  4 g Oral BID WITH MEALS    cyanocobalamin tablet 500 mcg  500 mcg Oral 7am    losartan (COZAAR) tablet 100 mg  100 mg Oral DAILY    magnesium oxide (MAG-OX) tablet 400 mg  400 mg Oral BID    thyroid (Pork) (ARMOUR) tablet 180 mg  180 mg Oral DAILY    sodium chloride (NS) flush 5-40 mL  5-40 mL IntraVENous Q8H    sodium chloride (NS) flush 5-40 mL  5-40 mL IntraVENous PRN    acetaminophen (TYLENOL) tablet 650 mg  650 mg Oral Q6H PRN    Or    acetaminophen (TYLENOL) suppository 650 mg  650 mg Rectal Q6H PRN    polyethylene glycol (MIRALAX) packet 17 g  17 g Oral DAILY PRN    sucralfate (CARAFATE) tablet 1 g  1 g Oral AC&HS    hydrALAZINE (APRESOLINE) 20 mg/mL injection 20 mg  20 mg IntraVENous Q6H PRN    hydroCHLOROthiazide (HYDRODIURIL) tablet 25 mg  25 mg Oral DAILY    dextrose 40% (GLUTOSE) oral gel 1 Tube  15 g Oral PRN    glucagon (GLUCAGEN) injection 1 mg  1 mg IntraMUSCular PRN    dextrose (D50W) injection syrg 12.5-25 g  25-50 mL IntraVENous PRN    dextrose 5 % - 0.45% NaCl infusion  75 mL/hr IntraVENous CONTINUOUS    levETIRAcetam (KEPPRA) tablet 500 mg  500 mg Oral BID    pantoprazole (PROTONIX) 40 mg in 0.9% sodium chloride 10 mL injection  40 mg IntraVENous Q12H    sodium chloride (NS) flush 5-10 mL  5-10 mL IntraVENous Q8H    sodium chloride (NS) flush 5-10 mL  5-10 mL IntraVENous PRN    morphine injection 1 mg  1 mg IntraVENous Q4H PRN    ondansetron (ZOFRAN) injection 4 mg  4 mg IntraVENous Q4H PRN       Signed:  Vandana Lyons NP    Part of this note may have been written by using a voice dictation software. The note has been proof read but may still contain some grammatical/other typographical errors.

## 2021-11-17 NOTE — PROGRESS NOTES
ACUTE PHYSICAL THERAPY GOALS:  (Developed with and agreed upon by patient and/or caregiver. )  LTG:  (1.)Ms. Spears will move from supine to sit and sit to supine , scoot up and down and roll side to side in bed with MODIFIED INDEPENDENCE within 7 treatment day(s). (2.)Ms. Spears will transfer from bed to chair and chair to bed with MODIFIED INDEPENDENCE using the least restrictive device within 7 treatment day(s). (3.)Ms. Spears will ambulate with SUPERVISION for 500 feet with the least restrictive device within 7 treatment day(s). (4.)Ms. Spears will perform standing static and dynamic balance activities x 15 minutes with SUPERVISION to improve safety within 7 day(s). PHYSICAL THERAPY: Daily Note and PM Treatment Day # 2    Viridiana Herrera is a 78 y.o. female   PRIMARY DIAGNOSIS: <principal problem not specified>  Subdural hematoma ([facility] [address] [S06.5X9A]  Procedure(s) (LRB):  ESOPHAGOGASTRODUODENOSCOPY (EGD)/ 23/ ICU 3105 (N/A)  ESOPHAGOGASTRODUODENAL (EGD) BIOPSY (N/A)  1 Day Post-Op    ASSESSMENT:     REHAB RECOMMENDATIONS: CURRENT LEVEL OF FUNCTION:  (Most Recently Demonstrated)   Recommendation to date pending progress:  [address] [place] [address]  Equipment:    To Be Determined Bed Mobility:   Standby Assistance  Sit to Stand:   Contact Guard Assistance  Transfers:   Minimal Assistance  Gait/Mobility:   Minimal Assistance     ASSESSMENT:  Ms. Gustavo Bautista was supine in bed and agreeable to PT. She was able to come to sitting on EOB with SBA and good sitting balance. She stood with Paulina and fair standing balance. Pt transferred to chair with Paulina and additional time. She reported increased dizziness with moving today but no dry heaving. Slight progress in mobility today. SUBJECTIVE:   Ms. Gustavo Bautista states, \"Everyone has been so nice. \"    SOCIAL HISTORY/ LIVING ENVIRONMENT: see eval  Home Environment: Private residence  One/Two Story Residence: One story  Living Alone: No  Support Systems: Spouse/Significant Other, Child(rayne) (pt lives with spouse, son nearby.)  OBJECTIVE:     PAIN: VITAL SIGNS: LINES/DRAINS:   Pre Treatment: Pain Screen  Pain Scale 1: Numeric (0 - 10)  Pain Intensity 1: 10  Pain Onset 1: acute  Pain Location 1: Head  Post Treatment: 8   Case Catheter and IV  O2 Device: None (Room air)     MOBILITY: I Mod I S SBA CGA Min Mod Max Total  NT x2 Comments:   Bed Mobility    Rolling [] [] [] [x] [] [] [] [] [] [] []    Supine to Sit [] [] [] [x] [] [] [] [] [] [] []    Scooting [] [] [] [] [] [] [] [] [] [] []    Sit to Supine [] [] [] [] [] [] [] [] [] [] []    Transfers    Sit to Stand [] [] [] [] [] [x] [] [] [] [] []    Bed to Chair [] [] [] [] [] [x] [] [] [] [] []    Stand to Sit [] [] [] [] [] [x] [] [] [] [] []    I=Independent, Mod I=Modified Independent, S=Supervision, SBA=Standby Assistance, CGA=Contact Guard Assistance,   Min=Minimal Assistance, Mod=Moderate Assistance, Max=Maximal Assistance, Total=Total Assistance, NT=Not Tested    BALANCE: Good Fair+ Fair Fair- Poor NT Comments   Sitting Static [x] [] [] [] [] []    Sitting Dynamic [] [x] [] [] [] []              Standing Static [] [] [x] [] [] []    Standing Dynamic [] [] [] [x] [] []      GAIT: I Mod I S SBA CGA Min Mod Max Total  NT x2 Comments:   Level of Assistance [] [] [] [] [] [x] [] [] [] [] []    Distance 3 ft    DME HHA    Gait Quality Decreased B LE step clearance    Weightbearing  Status N/A     I=Independent, Mod I=Modified Independent, S=Supervision, SBA=Standby Assistance, CGA=Contact Guard Assistance,   Min=Minimal Assistance, Mod=Moderate Assistance, Max=Maximal Assistance, Total=Total Assistance, NT=Not Tested    PLAN:   FREQUENCY/DURATION: PT Plan of Care: 3 times/week for duration of hospital stay or until stated goals are met, whichever comes first.  TREATMENT:     TREATMENT:   ($$ Therapeutic Activity: 8-22 mins    )  Therapeutic Activity (10 Minutes):  Therapeutic activity included Rolling, Supine to Sit, Scooting, Transfer Training, Ambulation on level ground, Sitting balance  and Standing balance to improve functional Mobility, Strength and Activity tolerance.     TREATMENT GRID:  N/A    AFTER TREATMENT POSITION/PRECAUTIONS:  Alarm Activated, Chair and RN notified    INTERDISCIPLINARY COLLABORATION:  RN/PCT and PT/PTA    TOTAL TREATMENT DURATION:  PT Patient Time In/Time Out  Time In: 1332  Time Out: David Syed DPT

## 2021-11-17 NOTE — PROGRESS NOTES
Problem: Risk for Spread of Infection  Goal: Prevent transmission of infectious organism to others  Description: Prevent the transmission of infectious organisms to other patients, staff members, and visitors. Outcome: Progressing Towards Goal     Problem: Patient Education:  Go to Education Activity  Goal: Patient/Family Education  Outcome: Progressing Towards Goal     Problem: Falls - Risk of  Goal: *Absence of Falls  Description: Document Bard Cabrales Fall Risk and appropriate interventions in the flowsheet. Outcome: Progressing Towards Goal  Note: Fall Risk Interventions:  Mobility Interventions: Bed/chair exit alarm, Patient to call before getting OOB         Medication Interventions: Bed/chair exit alarm, Patient to call before getting OOB    Elimination Interventions: Bed/chair exit alarm, Call light in reach, Patient to call for help with toileting needs    History of Falls Interventions: Bed/chair exit alarm         Problem: Patient Education: Go to Patient Education Activity  Goal: Patient/Family Education  Outcome: Progressing Towards Goal     Problem: Patient Education: Go to Patient Education Activity  Goal: Patient/Family Education  Outcome: Progressing Towards Goal     Problem: Patient Education: Go to Patient Education Activity  Goal: Patient/Family Education  Outcome: Progressing Towards Goal     Problem: Pressure Injury - Risk of  Goal: *Prevention of pressure injury  Description: Document Rusty Scale and appropriate interventions in the flowsheet.   Outcome: Progressing Towards Goal  Note: Pressure Injury Interventions:  Sensory Interventions: Assess changes in LOC    Moisture Interventions: Absorbent underpads, Check for incontinence Q2 hours and as needed    Activity Interventions: Increase time out of bed, PT/OT evaluation    Mobility Interventions: HOB 30 degrees or less, Pressure redistribution bed/mattress (bed type)    Nutrition Interventions: Document food/fluid/supplement intake    Friction and Shear Interventions: Apply protective barrier, creams and emollients                Problem: Patient Education: Go to Patient Education Activity  Goal: Patient/Family Education  Outcome: Progressing Towards Goal

## 2021-11-17 NOTE — PROGRESS NOTES
[facility] Progress Note         Admit Date:  11/14/2021    Today's Date:  11/17/2021    CC: Abdominal pain, nausea/vomiting, chronic diarrhea    Subjective:     Patient is tolerating CLD thus far. Abd pain persists, improves with pain medicine. Diarrhea has resolved.     Medications:   Current Facility-Administered Medications   Medication Dose Route Frequency    amLODIPine (NORVASC) tablet 5 mg  5 mg Oral QPM    [Held by provider] carvediloL (COREG) tablet 25 mg  25 mg Oral BID WITH MEALS    cholecalciferol (VITAMIN D3) (1000 Units /25 mcg) tablet 2,000 Units  2,000 Units Oral DAILY    cholestyramine-aspartame (QUESTRAN LIGHT) packet 4 g  4 g Oral BID WITH MEALS    cyanocobalamin tablet 500 mcg  500 mcg Oral 7am    losartan (COZAAR) tablet 100 mg  100 mg Oral DAILY    magnesium oxide (MAG-OX) tablet 400 mg  400 mg Oral BID    thyroid (Pork) (ARMOUR) tablet 180 mg  180 mg Oral DAILY    sodium chloride (NS) flush 5-40 mL  5-40 mL IntraVENous Q8H    sodium chloride (NS) flush 5-40 mL  5-40 mL IntraVENous PRN    acetaminophen (TYLENOL) tablet 650 mg  650 mg Oral Q6H PRN    Or    acetaminophen (TYLENOL) suppository 650 mg  650 mg Rectal Q6H PRN    polyethylene glycol (MIRALAX) packet 17 g  17 g Oral DAILY PRN    sucralfate (CARAFATE) tablet 1 g  1 g Oral AC&HS    hydrALAZINE (APRESOLINE) 20 mg/mL injection 20 mg  20 mg IntraVENous Q6H PRN    hydroCHLOROthiazide (HYDRODIURIL) tablet 25 mg  25 mg Oral DAILY    dextrose 40% (GLUTOSE) oral gel 1 Tube  15 g Oral PRN    glucagon (GLUCAGEN) injection 1 mg  1 mg IntraMUSCular PRN    dextrose (D50W) injection syrg 12.5-25 g  25-50 mL IntraVENous PRN    dextrose 5 % - 0.45% NaCl infusion  75 mL/hr IntraVENous CONTINUOUS    levETIRAcetam (KEPPRA) tablet 500 mg  500 mg Oral BID    pantoprazole (PROTONIX) 40 mg in 0.9% sodium chloride 10 mL injection  40 mg IntraVENous Q12H    sodium chloride (NS) flush 5-10 mL  5-10 mL IntraVENous Q8H  sodium chloride (NS) flush 5-10 mL  5-10 mL IntraVENous PRN    morphine injection 1 mg  1 mg IntraVENous Q4H PRN    ondansetron (ZOFRAN) injection 4 mg  4 mg IntraVENous Q4H PRN       Review of Systems:  ROS was obtained, with pertinent positives as listed above. No chest pain or SOB. Diet:      Objective:   Vitals:  Visit Vitals  BP (!) 93/57 (BP 1 Location: Right upper arm, BP Patient Position: At rest)   Pulse 72   Temp 98.1 °F (36.7 °C)   Resp 19   Ht 5' 2.5\" (1.588 m)   Wt 57.6 kg (126 lb 15.8 oz)   SpO2 95%   Breastfeeding No   BMI 22.86 kg/m²     Intake/Output:  No intake/output data recorded. 11/15 1901 - 11/17 0700  In: 2511.5 [P.O.:600; I.V.:1911.5]  Out: 8050 [Urine:3995]    Exam:  GENERAL: Alert, cooperative, in no acute distress  CV: Regular rate and rhythm  RESP: Clear to auscultation bilaterally anteriorly  ABD: Soft, moderately TTP in epigastrium, non-distended, normoactive bowel sounds, no organomegaly appreciated  EXTREM: Extremities normal, atraumatic, no cyanosis or edema  NEURO: Awake and alert    Data Review (Labs):    Recent Labs     11/17/21  0539 11/16/21  0310 11/15/21  0321 11/14/21  1749   WBC 4.3 4.5 6.5 6.2   HGB 12.0 10.6* 11.8 13.5   HCT 36.9 33.3* 36.8 39.5    172 221 286   MCV 95.6 93.8 94.8 92.9   * 135* 138 132*   K 3.3* 3.9 4.5 4.4    102 106 99   CO2 28 30 21 22   BUN 2* 4* 5* 6*   CREA 0.61 0.69 0.66 0.79   CA 7.4* 7.3* 7.3* 8.5   MG  --   --   --  1.9   * 148* 52* 70   AP  --   --  91 109   ALT  --   --  14 21   TBILI  --   --  0.5 0.5   ALB  --   --  2.8* 3.3   TP  --   --  6.5 7.5   LPSE  --   --   --  80       Assessment:     Active Problems:    Hypothyroidism (9/30/2013)      GERD (gastroesophageal reflux disease) (9/30/2013)      Overview: S/p Nissen Fundoplication      Essential hypertension (9/30/2013)      Overview: 1.  Echo (5/20/14) : Normal LV systolic function. EF 55-60%. Normal       diastolic function. RVSP 40-45.   Mild to moderate tricuspid       regurgitation. Mild mitral regurgitation. 2.  Renal artery US (7/8/14): No evidence of renal artery stenosis. 3.  Plasma Metanephrine. (6/25/14): Normal      4. Echo (11/14):  EF 10-46%  Grade 1 diastolic dysfunction. Mild mitral       regurgitaiton. Dyslipidemia (5/20/2014)      Peptic ulcer disease (8/11/2016)      Overview: 1. EGD May 2015 with several ulcers. Subdural hematoma (HCC) (11/14/2021)      Abdominal pain (11/14/2021)      Chronic diarrhea (11/15/2021)      Orbital floor fracture ([address] (11/15/2021)      Maxillary sinus fracture ([address] (11/15/2021)      78 y.o. female with PMH including but not limited to SOD dysfunction, chronic pancreatitis, conversion disorder, nephrolithiasis, GERD, PUD, TIA, IBS admitted with  recurrent nausea/ abd pain/ diarrhea with recent fall. CT head shows acute on chronic right SDH with mild right to left midline shift of 6 mm as well as left maxillary sinus fractures and a nondisplaced left orbital floor fracture. Neurosurgery has consulted and no acute surgical needs noted. ENT consult pending. She reports RUQ to epigastric pain since her ERCP continuous with dry heaves and nausea. Reports taking Ibuprofen since her fall for facial pain. She reports eating very little and losing 33 pounds over the last yesr. She denies significant diarrhea now. CTAP 11/14 no acute findings. Labs reviewed with hgb 11.8, WBC 6.5, LFT's wnl, Lipase 80. EGD 11/16/21  PRE-OP DIAGNOSIS:  1. Epigastric abdominal pain (improved, per patient)  2. Nausea/vomiting (improved, per patient)  3. Chronic diarrhea (improved, per patient)  POST-OP DIAGNOSIS:  1. Hiatal hernia  2. Gastric ulcers   FINDINGS:  ESOPHAGUS: Normal proximal, mid, and distal esophagus. STOMACH: The flap valve is considered Hill grade III on retroflexed view.  A 2 cm sliding-type hiatal hernia without Manolo ulcers was noted with Z line located at 38 cm and diaphragmatic hiatus 40 cm from the incisors. A 5-6 mm clean based ulcer was noted in the antrum with several smaller clean based ulcers nearby. Otherwise normal gastric mucosa throughout. Random gastric biopsies were obtained. DUODENUM: Normal bulb and 2nd portion. Duodenal biopsies were obtained. PLAN:  - F/u path, will treat H pylori if positive  - Continue PPI  - Resume bland diet once okay with primary  - Avoid NSAIDs!  - F/u fecal elastase  - Continue Questran  - Repeat EGD in 8-12 weeks      Plan:     - Advance diet to bland as tolerated, if okay with primary  - Continue pantoprazole 40 mg IV BID, may change to PO ACB/S x 14 days then ACB thereafter when able to tolerate PO  - Supportive care with antiemetics   - F/u fecal elastase, continue Questran for now  - Repeat EGD in 8-12 weeks  - No further recommendations from a GI standpoint, please call with questions/concerns, will arrange OP f/u      Tracy Garcia M.D. [facility]

## 2021-11-17 NOTE — PROGRESS NOTES
Physician Progress Note      Theresa López  [facility] #:                  [id number]  :                       1945  ADMIT DATE:       2021 5:28 PM  [address] [place] DATE:  RESPONDING  PROVIDER #:        Tyrel JACOME MD        QUERY TEXT:    Type of Anemia: Please provide further specificity, if known. Clinical indicators include: hematoma, anemia, hgb, rbc, hct, platelet, hemorrhage  Options provided:  -- Anemia due to acute blood loss  -- Anemia due to chronic blood loss  -- Anemia due to iron deficiency  -- Anemia due to postoperative blood loss  -- Anemia due to chronic disease  -- Other - I will add my own diagnosis  -- Disagree - Not applicable / Not valid  -- Disagree - Clinically Unable to determine / Unknown        PROVIDER RESPONSE TEXT:    The patient has anemia due to chronic blood loss. QUERY TEXT:    Pt admitted with ***. Pt noted to have *** (radiology finding or documentation of sulcal effacement, mass effect, midline shift, etc.). If clinically significant, please document in progress notes and discharge summary if you are evaluating/treating any of the following: The medical record reflects the following:  Risk Factors: 78 YOF, Hx previous fall with SDH, recent fall SDH  Clinical Indicators: confusion, memory issues as reported by family, Nausea, dry heaves, facial trauma, diffuse frontal headache   CT Head: The appearance is most concerning for an acute on chronic subdural hematoma. This measures up to 13 mm in width. Mild mass effect is seen with right to left midline shift measuring 6 mm at this time. .  The ventricles are normal in size and configuration. No evidence of midline shift or obvious mass effect is seen. 11/15 CT Head without contrast: Acute-on-subacute right-sided subdural hemorrhage (approximately 14 mm at the maximal thickness).  Overall size of the subdural collection appears similar to prior exam; however the acute component has increased when compared to CT from yesterday. Associated mass effect and 5 mm right-to-left midline shift. 11/15 PN: Has ongoing nausea, abd pain, dry heaves, diarrhea, knocked out teeth with fall and has facial trauma  Treatment: ICU Monitoring, Neurosurgery consult, neuro checks    Thank you,  Charmel Hodgkins RN,C BSN  Clinical   [email or web address]  Options provided:  -- Cerebral edema  -- Brain compression  -- Cerebral edema and Brain compression  -- Other - I will add my own diagnosis  -- Disagree - Not applicable / Not valid  -- Disagree - Clinically unable to determine / Unknown  -- Refer to Clinical Documentation Reviewer    PROVIDER RESPONSE TEXT:    This patient has cerebral edema and brain compression. Query created by:  Delphine Queen on 11/15/2021 8:17 PM      Electronically signed by:  Edelmira JACOME MD 11/17/2021 4:26 PM

## 2021-11-17 NOTE — PROGRESS NOTES
Bedside shift change report given to Benigno James (oncoming nurse) by JOLANTA Gao (offgoing nurse). Report included the following information SBAR, Kardex, ED Summary, Procedure Summary, Intake/Output, MAR, Recent Results and Cardiac Rhythm SR/SB.     Dual neuro assessment and NIH performed  Awaiting telebox to be available to transfer patient out

## 2021-11-18 LAB
ANION GAP SERPL CALC-SCNC: 4 MMOL/L (ref 7–16)
BASOPHILS # BLD: 0 K/UL (ref 0–0.2)
BASOPHILS NFR BLD: 1 % (ref 0–2)
BUN SERPL-MCNC: 3 MG/DL (ref 8–23)
CALCIUM SERPL-MCNC: 8 MG/DL (ref 8.3–10.4)
CHLORIDE SERPL-SCNC: 105 MMOL/L (ref 98–107)
CO2 SERPL-SCNC: 28 MMOL/L (ref 21–32)
CREAT SERPL-MCNC: 0.65 MG/DL (ref 0.6–1)
DIFFERENTIAL METHOD BLD: ABNORMAL
EOSINOPHIL # BLD: 0.2 K/UL (ref 0–0.8)
EOSINOPHIL NFR BLD: 4 % (ref 0.5–7.8)
ERYTHROCYTE [DISTWIDTH] IN BLOOD BY AUTOMATED COUNT: 14 % (ref 11.9–14.6)
GLUCOSE SERPL-MCNC: 94 MG/DL (ref 65–100)
HCT VFR BLD AUTO: 37.5 % (ref 35.8–46.3)
HGB BLD-MCNC: 12.4 G/DL (ref 11.7–15.4)
IMM GRANULOCYTES # BLD AUTO: 0 K/UL (ref 0–0.5)
IMM GRANULOCYTES NFR BLD AUTO: 0 % (ref 0–5)
LYMPHOCYTES # BLD: 1 K/UL (ref 0.5–4.6)
LYMPHOCYTES NFR BLD: 23 % (ref 13–44)
MCH RBC QN AUTO: 31.6 PG (ref 26.1–32.9)
MCHC RBC AUTO-ENTMCNC: 33.1 G/DL (ref 31.4–35)
MCV RBC AUTO: 95.4 FL (ref 79.6–97.8)
MONOCYTES # BLD: 0.6 K/UL (ref 0.1–1.3)
MONOCYTES NFR BLD: 15 % (ref 4–12)
NEUTS SEG # BLD: 2.4 K/UL (ref 1.7–8.2)
NEUTS SEG NFR BLD: 57 % (ref 43–78)
NRBC # BLD: 0 K/UL (ref 0–0.2)
PLATELET # BLD AUTO: 150 K/UL (ref 150–450)
PMV BLD AUTO: 9.6 FL (ref 9.4–12.3)
POTASSIUM SERPL-SCNC: 3.5 MMOL/L (ref 3.5–5.1)
RBC # BLD AUTO: 3.93 M/UL (ref 4.05–5.2)
SODIUM SERPL-SCNC: 137 MMOL/L (ref 136–145)
WBC # BLD AUTO: 4.3 K/UL (ref 4.3–11.1)

## 2021-11-18 PROCEDURE — 74011250636 HC RX REV CODE- 250/636: Performed by: FAMILY MEDICINE

## 2021-11-18 PROCEDURE — 85025 COMPLETE CBC W/AUTO DIFF WBC: CPT

## 2021-11-18 PROCEDURE — 74011250637 HC RX REV CODE- 250/637: Performed by: INTERNAL MEDICINE

## 2021-11-18 PROCEDURE — 97530 THERAPEUTIC ACTIVITIES: CPT

## 2021-11-18 PROCEDURE — 36415 COLL VENOUS BLD VENIPUNCTURE: CPT

## 2021-11-18 PROCEDURE — 74011000250 HC RX REV CODE- 250: Performed by: STUDENT IN AN ORGANIZED HEALTH CARE EDUCATION/TRAINING PROGRAM

## 2021-11-18 PROCEDURE — 74011250636 HC RX REV CODE- 250/636: Performed by: STUDENT IN AN ORGANIZED HEALTH CARE EDUCATION/TRAINING PROGRAM

## 2021-11-18 PROCEDURE — 80048 BASIC METABOLIC PNL TOTAL CA: CPT

## 2021-11-18 PROCEDURE — C9113 INJ PANTOPRAZOLE SODIUM, VIA: HCPCS | Performed by: STUDENT IN AN ORGANIZED HEALTH CARE EDUCATION/TRAINING PROGRAM

## 2021-11-18 PROCEDURE — 74011250637 HC RX REV CODE- 250/637: Performed by: NURSE PRACTITIONER

## 2021-11-18 PROCEDURE — 65270000029 HC RM PRIVATE

## 2021-11-18 PROCEDURE — 97110 THERAPEUTIC EXERCISES: CPT

## 2021-11-18 PROCEDURE — 74011250636 HC RX REV CODE- 250/636: Performed by: INTERNAL MEDICINE

## 2021-11-18 RX ORDER — PANTOPRAZOLE SODIUM 40 MG/1
40 TABLET, DELAYED RELEASE ORAL
Status: DISCONTINUED | OUTPATIENT
Start: 2021-11-19 | End: 2021-11-19 | Stop reason: HOSPADM

## 2021-11-18 RX ADMIN — AMLODIPINE BESYLATE 5 MG: 5 TABLET ORAL at 17:01

## 2021-11-18 RX ADMIN — LEVOTHYROXINE, LIOTHYRONINE 180 MG: 38; 9 TABLET ORAL at 08:07

## 2021-11-18 RX ADMIN — CYANOCOBALAMIN TAB 1000 MCG 500 MCG: 1000 TAB at 05:17

## 2021-11-18 RX ADMIN — MORPHINE SULFATE 1 MG: 2 INJECTION, SOLUTION INTRAMUSCULAR; INTRAVENOUS at 20:15

## 2021-11-18 RX ADMIN — Medication 10 ML: at 14:51

## 2021-11-18 RX ADMIN — Medication 5 ML: at 21:02

## 2021-11-18 RX ADMIN — ONDANSETRON 4 MG: 2 INJECTION INTRAMUSCULAR; INTRAVENOUS at 10:38

## 2021-11-18 RX ADMIN — Medication 400 MG: at 08:07

## 2021-11-18 RX ADMIN — Medication 10 ML: at 05:18

## 2021-11-18 RX ADMIN — ONDANSETRON 4 MG: 2 INJECTION INTRAMUSCULAR; INTRAVENOUS at 20:14

## 2021-11-18 RX ADMIN — SUCRALFATE 1 G: 1 TABLET ORAL at 12:04

## 2021-11-18 RX ADMIN — MORPHINE SULFATE 1 MG: 2 INJECTION, SOLUTION INTRAMUSCULAR; INTRAVENOUS at 10:33

## 2021-11-18 RX ADMIN — LEVETIRACETAM 500 MG: 500 TABLET, FILM COATED ORAL at 08:07

## 2021-11-18 RX ADMIN — LEVETIRACETAM 500 MG: 500 TABLET, FILM COATED ORAL at 17:01

## 2021-11-18 RX ADMIN — VITAMIN D, TAB 1000IU (100/BT) 2000 UNITS: 25 TAB at 08:07

## 2021-11-18 RX ADMIN — ONDANSETRON 4 MG: 2 INJECTION INTRAMUSCULAR; INTRAVENOUS at 03:45

## 2021-11-18 RX ADMIN — LOSARTAN POTASSIUM 100 MG: 50 TABLET, FILM COATED ORAL at 08:07

## 2021-11-18 RX ADMIN — SODIUM CHLORIDE 40 MG: 9 INJECTION INTRAMUSCULAR; INTRAVENOUS; SUBCUTANEOUS at 08:09

## 2021-11-18 RX ADMIN — SUCRALFATE 1 G: 1 TABLET ORAL at 15:54

## 2021-11-18 RX ADMIN — Medication 10 ML: at 05:17

## 2021-11-18 RX ADMIN — SUCRALFATE 1 G: 1 TABLET ORAL at 05:17

## 2021-11-18 RX ADMIN — CHOLESTYRAMINE 4 G: 4 POWDER, FOR SUSPENSION ORAL at 17:02

## 2021-11-18 RX ADMIN — CARVEDILOL 25 MG: 12.5 TABLET, FILM COATED ORAL at 17:02

## 2021-11-18 RX ADMIN — MORPHINE SULFATE 1 MG: 2 INJECTION, SOLUTION INTRAMUSCULAR; INTRAVENOUS at 15:52

## 2021-11-18 RX ADMIN — MORPHINE SULFATE 1 MG: 2 INJECTION, SOLUTION INTRAMUSCULAR; INTRAVENOUS at 03:45

## 2021-11-18 RX ADMIN — Medication 400 MG: at 17:01

## 2021-11-18 RX ADMIN — HYDROCHLOROTHIAZIDE 25 MG: 25 TABLET ORAL at 08:07

## 2021-11-18 RX ADMIN — ONDANSETRON 4 MG: 2 INJECTION INTRAMUSCULAR; INTRAVENOUS at 15:58

## 2021-11-18 RX ADMIN — CHOLESTYRAMINE 4 G: 4 POWDER, FOR SUSPENSION ORAL at 08:08

## 2021-11-18 NOTE — PROGRESS NOTES
Problem: Risk for Spread of Infection  Goal: Prevent transmission of infectious organism to others  Description: Prevent the transmission of infectious organisms to other patients, staff members, and visitors. Outcome: Progressing Towards Goal     Problem: Patient Education:  Go to Education Activity  Goal: Patient/Family Education  Outcome: Progressing Towards Goal     Problem: Falls - Risk of  Goal: *Absence of Falls  Description: Document Dalia Warner Fall Risk and appropriate interventions in the flowsheet. Outcome: Progressing Towards Goal  Note: Fall Risk Interventions:  Mobility Interventions: Bed/chair exit alarm, Patient to call before getting OOB         Medication Interventions: Bed/chair exit alarm, Patient to call before getting OOB    Elimination Interventions: Bed/chair exit alarm, Call light in reach, Patient to call for help with toileting needs    History of Falls Interventions: Bed/chair exit alarm         Problem: Patient Education: Go to Patient Education Activity  Goal: Patient/Family Education  Outcome: Progressing Towards Goal     Problem: Patient Education: Go to Patient Education Activity  Goal: Patient/Family Education  Outcome: Progressing Towards Goal     Problem: Patient Education: Go to Patient Education Activity  Goal: Patient/Family Education  Outcome: Progressing Towards Goal     Problem: Pressure Injury - Risk of  Goal: *Prevention of pressure injury  Description: Document Rusty Scale and appropriate interventions in the flowsheet.   Outcome: Progressing Towards Goal  Note: Pressure Injury Interventions:  Sensory Interventions: Assess changes in LOC    Moisture Interventions: Absorbent underpads, Check for incontinence Q2 hours and as needed    Activity Interventions: Increase time out of bed, PT/OT evaluation    Mobility Interventions: HOB 30 degrees or less, PT/OT evaluation    Nutrition Interventions: Document food/fluid/supplement intake    Friction and Shear Interventions: HOB 30 degrees or less                Problem: Patient Education: Go to Patient Education Activity  Goal: Patient/Family Education  Outcome: Progressing Towards Goal

## 2021-11-18 NOTE — PROGRESS NOTES
Date of Outreach Update:  Azalea June was seen and assessed. MEWS Score: 1 (11/18/21 0533)  Vitals:    11/18/21 0533 11/18/21 0648 11/18/21 1055 11/18/21 1537   BP: 124/85 131/76 (!) 151/86 135/87   Pulse: 71 81 89 90   Resp: 16 19 20 19   Temp: 98.3 °F (36.8 °C) 98.5 °F (36.9 °C) 98.6 °F (37 °C) 98.3 °F (36.8 °C)   SpO2: 94% 96% 98% 96%   Weight:       Height:             Pain Assessment  Pain Intensity 1: 0 (11/18/21 1658)  Pain Location 1: Head  Pain Intervention(s) 1: Medication (see MAR)  Patient Stated Pain Goal: 0      Previous Outreach assessment has been reviewed. There have been no significant clinical changes since the completion of the last dated Outreach assessment. Will continue to follow up per outreach protocol.     Signed By:   Shailesh Montana RN    November 18, 2021 6:08 PM

## 2021-11-18 NOTE — PROGRESS NOTES
PT Note:  Treatment attempted this PM but pt politely refused stating she was tired and already worked with therapy. Will re-attempt pending schedule and pt status.   Thanks,  Bora Benson, PT, DPT

## 2021-11-18 NOTE — PROGRESS NOTES
Problem: Risk for Spread of Infection  Goal: Prevent transmission of infectious organism to others  Description: Prevent the transmission of infectious organisms to other patients, staff members, and visitors. Outcome: Progressing Towards Goal     Problem: Patient Education:  Go to Education Activity  Goal: Patient/Family Education  Outcome: Progressing Towards Goal     Problem: Falls - Risk of  Goal: *Absence of Falls  Description: Document Tegan Almodovar Fall Risk and appropriate interventions in the flowsheet. Outcome: Progressing Towards Goal  Note: Fall Risk Interventions:  Mobility Interventions: Bed/chair exit alarm, Patient to call before getting OOB         Medication Interventions: Patient to call before getting OOB, Teach patient to arise slowly    Elimination Interventions: Call light in reach, Patient to call for help with toileting needs    History of Falls Interventions: Door open when patient unattended         Problem: Patient Education: Go to Patient Education Activity  Goal: Patient/Family Education  Outcome: Progressing Towards Goal     Problem: Patient Education: Go to Patient Education Activity  Goal: Patient/Family Education  Outcome: Progressing Towards Goal     Problem: Patient Education: Go to Patient Education Activity  Goal: Patient/Family Education  Outcome: Progressing Towards Goal     Problem: Pressure Injury - Risk of  Goal: *Prevention of pressure injury  Description: Document Rusty Scale and appropriate interventions in the flowsheet.   Outcome: Progressing Towards Goal  Note: Pressure Injury Interventions:  Sensory Interventions: Assess changes in LOC    Moisture Interventions: Absorbent underpads    Activity Interventions: Increase time out of bed, PT/OT evaluation, Pressure redistribution bed/mattress(bed type)    Mobility Interventions: HOB 30 degrees or less, Pressure redistribution bed/mattress (bed type), PT/OT evaluation    Nutrition Interventions: Document food/fluid/supplement intake    Friction and Shear Interventions: Apply protective barrier, creams and emollients, Foam dressings/transparent film/skin sealants                Problem: Patient Education: Go to Patient Education Activity  Goal: Patient/Family Education  Outcome: Progressing Towards Goal

## 2021-11-18 NOTE — PROGRESS NOTES
Date of Outreach Update:  Jane Burns was seen and assessed. MEWS Score: 1 (11/17/21 1948)  Vitals:    11/17/21 0730 11/17/21 1100 11/17/21 1600 11/17/21 1948   BP: (!) 93/57 103/71 115/74 119/77   Pulse: 72 74 73 76   Resp: 19 15 15 16   Temp: 98.1 °F (36.7 °C) 99.7 °F (37.6 °C) 98.3 °F (36.8 °C) 98.3 °F (36.8 °C)   SpO2: 95% 94% 98% 96%   Weight:       Height:             Pain Assessment  Pain Intensity 1: 3 (11/17/21 1913)  Pain Location 1: Head, Face  Pain Intervention(s) 1: Medication (see MAR)  Patient Stated Pain Goal: 3      Previous Outreach assessment has been reviewed. There have been no significant clinical changes since the completion of the last dated Outreach assessment. Patient asleep/resting upon arrival. Spoke with primary RN whom states no concerns at this time and states patient able to get up with PT today. Will continue to follow up per outreach protocol. Feel free to contact us with any concerns or patient concerns.     Signed By:   Fausto Triana RN    November 17, 2021 9:04 PM

## 2021-11-18 NOTE — PROGRESS NOTES
Hospitalist Progress Note   Admit Date:  2021  5:28 PM   Name:  Magdalena Dawson   Age:  78 y.o. Sex:  female  :  1945   MRN:  [id number]   Room:  9/    Presenting Complaint: Abdominal Pain    Reason(s) for Admission: Subdural hematoma [facility]) [facility] Course & Interval History:   Magdalena Dawson is a 78 y.o. female with medical history of chronic pancreatitis,conversion disorder,  hx of kidney stones, GERD, PUD, hx of TIA, IBS, who presented with report of abdominal pain and headache x 2 weeks.     Patient with hx of chronic pancreatitis s/p pancreatic stent placement that was removed during ERCP on 2021. Also noted to have antral erosions and started on carafate. Patient reports her abdominal pain returned 2 weeks ago and described as epigastric. She reports she has dry heaving after every attempt at PO intake and says everything she eats \"goes right through her\" - typically with 4-6 watery stools daily. Has been taken ibuprofen 4-5 x daily for abdominal pain and headaches.      Also of importance patient had a fall 11 days ago. Said she had a trip and fall in the kitchen and landed on her face, knocking out two of her teeth. Reports she did not go see the any doctors because she was embarrased by her appearance after the fall. Has subsequently had near constant diffuse frontal headache. Denies vision changes, difficulty with speech or swallow. Does admit to frequent vertigo symptoms. Family points out some mild memory loss over details in story (?baseline).      Reports a fall with resultant SDH one year ago - says she was seen at [facility] ER and sent home with instructions to call neurosurgery but never followed up.      ER workup notable for wbc 6.2, hgb 13, plts 286, Na 132, K 4.4, Cl 99, bicarb 22, BUN 6, Cr 0.79, Lipase 80, LA 3.2;  CT AP- unremarkable, CT head with acute / subacute right SDH with right to left midline shift ~6mm.  She was also found with multiple fractures of left maxillary sinus and nondisplaced fracture of left orbital floor. \"  CT cspine non acute.      Hospitalist asked to admit. Subjective (11/18/21):  \"I guess I must've been bleeding the whole time. \"  Pleasant 76y. o. WF sitting up in bed without acute complaints. She continues to have loose stooling which she reports has been going on for \"years\". Denies headaches. Review of Systems:  10 systems reviewed and negative except as noted in HPI.       Assessment & Plan:     Principal Problem:    Subdural hematoma ([address] (11/14/2021) with midline shift  - appreciate Dr. Luke Sharma note 11/16/2021  - recommends to avoid antiplatelet / anticoagulants  - repeat CT brain 7-10 days (last CT head 11/16/2021) with outpatient f/u neurosx thereafter  - awaiting PT/OT recs for inpatient versus outpatient needs  - cont keppra / seizure precautions    Active Problems:    Facial fractures / left orbital floor fx / maxillary fx  - nasal saline misting spray bid, avoid blowing nose  - outpatient f/u ENT as needed per Dr. Marjan Waggoner 11/16/2021      Chronic abd pain / loose stooling  - appreciate GI eval, s/p EGD 11/16/2021, findings of hiatal hernia and gastric ulcers  - recommends protonix 40mg daily, avoid NSAIDS, cont questran, repeat EGD 8-12weeks; path for H. Pylori negative  - GI to f/u fecal elastase   - encourage oral feeds       Anemia chronic disease  - no acute blood loss on EGD  - h/h remains stable (12.4/37.5 respectively)        Hypothyroidism   - cont home rx      Uncontrolled hypertension   - coreg resumed  - cont HCTZ / losartan      Chronic diarrhea (11/15/2021)  - stool samples pending      Hypokalemia   - resolved        Dispo/Discharge Planning:    - PT/OT final recs pending  - will confer to neurosx in AM to determine if pt can be dc'd home from their standpoint and will schedule repeat CT head outpatient      Diet:  ADULT DIET Clear Liquid  DVT PPx: SCD  Code status: Full Code    Hospital Problems as of 11/18/2021 Date Reviewed: 7/15/2021          Codes Class Noted - Resolved POA    Chronic diarrhea ICD-10-CM: K52.9  ICD-9-CM: 787.91  11/15/2021 - Present Unknown        Orbital floor fracture ([facility] ICD-10-CM: S02.30XA  ICD-9-CM: 802.6  11/15/2021 - Present Yes        Maxillary sinus fracture ([facility] [address] ICD-10-CM: S02.401A  ICD-9-CM: 802.4  11/15/2021 - Present Yes        * (Principal) Subdural hematoma ([facility] [address] ICD-10-CM: P70.8A6P  ICD-9-CM: 432.1  11/14/2021 - Present Unknown        Abdominal pain ICD-10-CM: R10.9  ICD-9-CM: 789.00  11/14/2021 - Present Unknown        Peptic ulcer disease ICD-10-CM: K27.9  ICD-9-CM: 533.90  8/11/2016 - Present Unknown    Overview Signed 8/11/2016  1:45 PM by Cami Medicesme     1. EGD May 2015 with several ulcers. Dyslipidemia (Chronic) ICD-10-CM: E78.5  ICD-9-CM: 272.4  5/20/2014 - Present Yes        Hypothyroidism ICD-10-CM: E03.9  ICD-9-CM: 244.9  9/30/2013 - Present Yes        GERD (gastroesophageal reflux disease) (Chronic) ICD-10-CM: K21.9  ICD-9-CM: 530.81  9/30/2013 - Present Yes    Overview Signed 11/1/2013  4:43 PM by Paul Finch NP     S/p Nissen Fundoplication             Essential hypertension ICD-10-CM: I10  ICD-9-CM: 401.9  9/30/2013 - Present Yes    Overview Signed 8/11/2016  1:44 PM by Byron Greco     1. Echo (5/20/14) : Normal LV systolic function. EF 55-60%. Normal diastolic function. RVSP 40-45. Mild to moderate tricuspid regurgitation. Mild mitral regurgitation. 2.  Renal artery US (7/8/14): No evidence of renal artery stenosis. 3.  Plasma Metanephrine. (6/25/14): Normal  4. Echo (11/14):  EF 22-81%  Grade 1 diastolic dysfunction. Mild mitral regurgitaiton.                     Objective:     Patient Vitals for the past 24 hrs:   Temp Pulse Resp BP SpO2   11/18/21 1055 98.6 °F (37 °C) 89 20 (!) 151/86 98 %   11/18/21 0648 98.5 °F (36.9 °C) 81 19 131/76 96 %   11/18/21 0533 98.3 °F (36.8 °C) 71 16 124/85 94 %   11/17/21 2349 98.1 °F (36.7 °C) 72 16 132/80 94 %   11/17/21 1948 98.3 °F (36.8 °C) 76 16 119/77 96 %   11/17/21 1600 98.3 °F (36.8 °C) 73 15 115/74 98 %     Oxygen Therapy  O2 Sat (%): 98 % (11/18/21 1055)  Pulse via Oximetry: 69 beats per minute (11/16/21 2235)  O2 Device: None (Room air) (11/16/21 1903)  Skin Assessment: Clean, dry, & intact (11/15/21 2302)  O2 Flow Rate (L/min): 3 l/min (11/16/21 1043)    Estimated body mass index is 22.86 kg/m² as calculated from the following:    Height as of this encounter: 5' 2.5\" (1.588 m). Weight as of this encounter: 57.6 kg (126 lb 15.8 oz). Intake/Output Summary (Last 24 hours) at 11/18/2021 1247  Last data filed at 11/18/2021 1046  Gross per 24 hour   Intake    Output 4300 ml   Net -4300 ml         Physical Exam:     Blood pressure (!) 151/86, pulse 89, temperature 98.6 °F (37 °C), resp. rate 20, height 5' 2.5\" (1.588 m), weight 57.6 kg (126 lb 15.8 oz), SpO2 98 %, not currently breastfeeding. General:    Well nourished. No overt distress  Head:  Normocephalic, atraumatic  Eyes:  IMproving ecchymotic areas b/l orbital area; sclerae appear normal.  Pupils equally round. ENT:  Nares appear normal, no drainage. Moist oral mucosa  Neck:  No restricted ROM. Trachea midline   CV:   RRR. No m/r/g. No jugular venous distension. Lungs:   CTAB. No wheezing, rhonchi, or rales. Respirations even, unlabored  Abdomen: Bowel sounds present. Soft, nontender, nondistended. Extremities: No cyanosis or clubbing. No edema  Skin:     No rashes and normal coloration. Warm and dry. Neuro:  CN II-XII grossly intact. Sensation intact. A&Ox3  Psych:  Normal mood and affect.       I have reviewed ordered lab tests and independently visualized imaging below:    Recent Labs:  Recent Results (from the past 48 hour(s))   CBC WITH AUTOMATED DIFF    Collection Time: 11/17/21  5:39 AM   Result Value Ref Range    WBC 4.3 4.3 - 11.1 K/uL    RBC 3.86 (L) 4.05 - 5.2 M/uL    HGB 12.0 11.7 - 15.4 g/dL    HCT 36.9 35.8 - 46.3 %    MCV 95.6 79.6 - 97.8 FL    MCH 31.1 26.1 - 32.9 PG    MCHC 32.5 31.4 - 35.0 g/dL    RDW 14.3 11.9 - 14.6 %    PLATELET 683 282 - 925 K/uL    MPV 9.7 9.4 - 12.3 FL    ABSOLUTE NRBC 0.00 0.0 - 0.2 K/uL    DF AUTOMATED      NEUTROPHILS 62 43 - 78 %    LYMPHOCYTES 23 13 - 44 %    MONOCYTES 11 4.0 - 12.0 %    EOSINOPHILS 4 0.5 - 7.8 %    BASOPHILS 1 0.0 - 2.0 %    IMMATURE GRANULOCYTES 1 0.0 - 5.0 %    ABS. NEUTROPHILS 2.6 1.7 - 8.2 K/UL    ABS. LYMPHOCYTES 1.0 0.5 - 4.6 K/UL    ABS. MONOCYTES 0.5 0.1 - 1.3 K/UL    ABS. EOSINOPHILS 0.2 0.0 - 0.8 K/UL    ABS. BASOPHILS 0.0 0.0 - 0.2 K/UL    ABS. IMM. GRANS. 0.0 0.0 - 0.5 K/UL   METABOLIC PANEL, BASIC    Collection Time: 11/17/21  5:39 AM   Result Value Ref Range    Sodium 135 (L) 136 - 145 mmol/L    Potassium 3.3 (L) 3.5 - 5.1 mmol/L    Chloride 102 98 - 107 mmol/L    CO2 28 21 - 32 mmol/L    Anion gap 5 (L) 7 - 16 mmol/L    Glucose 103 (H) 65 - 100 mg/dL    BUN 2 (L) 8 - 23 MG/DL    Creatinine 0.61 0.6 - 1.0 MG/DL    GFR est AA >60 >60 ml/min/1.73m2    GFR est non-AA >60 >60 ml/min/1.73m2    Calcium 7.4 (L) 8.3 - 10.4 MG/DL   PLEASE READ & DOCUMENT PPD TEST IN 48 HRS    Collection Time: 11/17/21  5:41 AM   Result Value Ref Range    PPD Negative Negative    mm Induration 0 0 - 5 mm   CBC WITH AUTOMATED DIFF    Collection Time: 11/18/21  5:13 AM   Result Value Ref Range    WBC 4.3 4.3 - 11.1 K/uL    RBC 3.93 (L) 4.05 - 5.2 M/uL    HGB 12.4 11.7 - 15.4 g/dL    HCT 37.5 35.8 - 46.3 %    MCV 95.4 79.6 - 97.8 FL    MCH 31.6 26.1 - 32.9 PG    MCHC 33.1 31.4 - 35.0 g/dL    RDW 14.0 11.9 - 14.6 %    PLATELET 119 789 - 137 K/uL    MPV 9.6 9.4 - 12.3 FL    ABSOLUTE NRBC 0.00 0.0 - 0.2 K/uL    DF AUTOMATED      NEUTROPHILS 57 43 - 78 %    LYMPHOCYTES 23 13 - 44 %    MONOCYTES 15 (H) 4.0 - 12.0 %    EOSINOPHILS 4 0.5 - 7.8 %    BASOPHILS 1 0.0 - 2.0 %    IMMATURE GRANULOCYTES 0 0.0 - 5.0 %    ABS. NEUTROPHILS 2.4 1.7 - 8.2 K/UL    ABS.  LYMPHOCYTES 1.0 0.5 - 4.6 K/UL    ABS. MONOCYTES 0.6 0.1 - 1.3 K/UL    ABS. EOSINOPHILS 0.2 0.0 - 0.8 K/UL    ABS. BASOPHILS 0.0 0.0 - 0.2 K/UL    ABS. IMM. GRANS. 0.0 0.0 - 0.5 K/UL   METABOLIC PANEL, BASIC    Collection Time: 11/18/21  5:13 AM   Result Value Ref Range    Sodium 137 136 - 145 mmol/L    Potassium 3.5 3.5 - 5.1 mmol/L    Chloride 105 98 - 107 mmol/L    CO2 28 21 - 32 mmol/L    Anion gap 4 (L) 7 - 16 mmol/L    Glucose 94 65 - 100 mg/dL    BUN 3 (L) 8 - 23 MG/DL    Creatinine 0.65 0.6 - 1.0 MG/DL    GFR est AA >60 >60 ml/min/1.73m2    GFR est non-AA >60 >60 ml/min/1.73m2    Calcium 8.0 (L) 8.3 - 10.4 MG/DL       All Micro Results     Procedure Component Value Units Date/Time    COVID-19 RAPID TEST [[id number]] Collected: 11/15/21 1757    Order Status: Completed Specimen: Nasopharyngeal Updated: 11/15/21 1822     Specimen source Nasopharyngeal        COVID-19 rapid test Not detected        Comment:      The specimen is NEGATIVE for SARS-CoV-2, the novel coronavirus associated with COVID-19. A negative result does not rule out COVID-19. This test has been authorized by the FDA under an Emergency Use Authorization (EUA) for use by authorized laboratories. Fact sheet for Healthcare Providers: ConventionUpdate.co.nz  Fact sheet for Patients: ConventionUpdate.co.nz       Methodology: Isothermal Nucleic Acid Amplification         C. DIFFICILE AG & TOXIN A/B [[id number]]     Order Status: Sent Specimen: Stool           Other Studies:  No results found.     Current Meds:  Current Facility-Administered Medications   Medication Dose Route Frequency    amLODIPine (NORVASC) tablet 5 mg  5 mg Oral QPM    [Held by provider] carvediloL (COREG) tablet 25 mg  25 mg Oral BID WITH MEALS    cholecalciferol (VITAMIN D3) (1000 Units /25 mcg) tablet 2,000 Units  2,000 Units Oral DAILY    cholestyramine-aspartame (QUESTRAN LIGHT) packet 4 g  4 g Oral BID WITH MEALS    cyanocobalamin tablet 500 mcg  500 mcg Oral 7am    losartan (COZAAR) tablet 100 mg  100 mg Oral DAILY    magnesium oxide (MAG-OX) tablet 400 mg  400 mg Oral BID    thyroid (Pork) (ARMOUR) tablet 180 mg  180 mg Oral DAILY    sodium chloride (NS) flush 5-40 mL  5-40 mL IntraVENous Q8H    sodium chloride (NS) flush 5-40 mL  5-40 mL IntraVENous PRN    acetaminophen (TYLENOL) tablet 650 mg  650 mg Oral Q6H PRN    Or    acetaminophen (TYLENOL) suppository 650 mg  650 mg Rectal Q6H PRN    polyethylene glycol (MIRALAX) packet 17 g  17 g Oral DAILY PRN    sucralfate (CARAFATE) tablet 1 g  1 g Oral AC&HS    hydrALAZINE (APRESOLINE) 20 mg/mL injection 20 mg  20 mg IntraVENous Q6H PRN    hydroCHLOROthiazide (HYDRODIURIL) tablet 25 mg  25 mg Oral DAILY    dextrose 40% (GLUTOSE) oral gel 1 Tube  15 g Oral PRN    glucagon (GLUCAGEN) injection 1 mg  1 mg IntraMUSCular PRN    dextrose (D50W) injection syrg 12.5-25 g  25-50 mL IntraVENous PRN    dextrose 5 % - 0.45% NaCl infusion  75 mL/hr IntraVENous CONTINUOUS    levETIRAcetam (KEPPRA) tablet 500 mg  500 mg Oral BID    pantoprazole (PROTONIX) 40 mg in 0.9% sodium chloride 10 mL injection  40 mg IntraVENous Q12H    sodium chloride (NS) flush 5-10 mL  5-10 mL IntraVENous Q8H    sodium chloride (NS) flush 5-10 mL  5-10 mL IntraVENous PRN    morphine injection 1 mg  1 mg IntraVENous Q4H PRN    ondansetron (ZOFRAN) injection 4 mg  4 mg IntraVENous Q4H PRN       Signed:  SKY Romero    Part of this note may have been written by using a voice dictation software. The note has been proof read but may still contain some grammatical/other typographical errors.

## 2021-11-18 NOTE — PROGRESS NOTES
Date of Outreach Update:  Jackie Rae was seen and assessed. MEWS Score: 1 (11/18/21 0533)  Vitals:    11/17/21 1948 11/17/21 2349 11/18/21 0533 11/18/21 0648   BP: 119/77 132/80 124/85 131/76   Pulse: 76 72 71 81   Resp: 16 16 16 19   Temp: 98.3 °F (36.8 °C) 98.1 °F (36.7 °C) 98.3 °F (36.8 °C) 98.5 °F (36.9 °C)   SpO2: 96% 94% 94% 96%   Weight:       Height:             Pain Assessment  Pain Intensity 1: 0 (11/18/21 0429)  Pain Location 1: Face, Head  Pain Intervention(s) 1: Medication (see MAR)  Patient Stated Pain Goal: 3      Previous Outreach assessment has been reviewed. There have been no significant clinical changes since the completion of the last dated Outreach assessment. Patient alert and oriented. Respirations unlabored. Denies complaints. Denies numbness/tingling. Strength equal bilaterally in upper and lower extremities. VS, labs, and progress notes reviewed. Will continue to follow up per outreach protocol.     Signed By:   Bulmaro Hinojosa RN    November 18, 2021 10:08 AM

## 2021-11-18 NOTE — PROGRESS NOTES
ACUTE OT GOALS:  (Developed with and agreed upon by patient and/or caregiver.)  1. Patient will complete lower body bathing and dressing with MOD I and adaptive equipment as needed. 2. Patient will complete toileting with MOD I.   3. Patient will tolerate 30 minutes of OT treatment with 1-2 rest breaks to increase activity tolerance for ADLs. 4. Patient will complete functional transfers with MOD I and adaptive equipment as needed. 5. Patient will complete functional mobility for household distances with MOD I and adaptive equipment as needed. 6. Patient will complete self-grooming while standing edge of sink with MOD I and adaptive equipment as needed.     Timeframe: 7 visits           OCCUPATIONAL THERAPY: Daily Note OT Treatment Day # 2    Nelli Villegas is a 78 y.o. female   PRIMARY DIAGNOSIS: Subdural hematoma (HCC)  Subdural hematoma ([facility] [address] [S06.5X9A]  Procedure(s) (LRB):  ESOPHAGOGASTRODUODENOSCOPY (EGD)/ 23/ ICU 3105 (N/A)  ESOPHAGOGASTRODUODENAL (EGD) BIOPSY (N/A)  2 Days Post-Op  [place] / [place] / Product Type: Medicare /   ASSESSMENT:     REHAB RECOMMENDATIONS: CURRENT LEVEL OF FUNCTION:  (Most Recently Demonstrated)   Recommendation to date pending progress:  [address] [place] [address]  Equipment:    To Be Determined Bathing:   Not tested  Dressing:   Not tested  Feeding/Grooming:   Minimal Assistance for washing/combing hair  Toileting:   Not tested  Functional Mobility:   Standby Assistance-CGA     ASSESSMENT:  Ms. Eugene Jackson is doing well today. Pt presents sitting EOB with family in room. Pt educated on energy conservation techniques, performed UE exercises and mobility in room to increase strength and activity tolerance. Pt noted to have sort of awkward posture. Educated on the importance of standing upright with mobility. Pt able to demonstrate understanding. Pt assisted with washing/combing her hair with shampoo cap.  Appreciative of the assistance. Good session for patient today. Will continue to benefit from skilled OT during stay.      SUBJECTIVE:   Ms. Eugene Jackson states, \"I cannot hear out of my left ear\"    SOCIAL HISTORY/LIVING ENVIRONMENT:   Home Environment: Private residence  One/Two Story Residence: One story  Living Alone: No  Support Systems: Spouse/Significant Other, Child(rayne) (pt lives with spouse, son nearby.)    OBJECTIVE:     PAIN: VITAL SIGNS: LINES/DRAINS:   Pre Treatment: Pain Screen  Pain Scale 1: Numeric (0 - 10)  Pain Intensity 1: 0  Post Treatment: 0   IV  O2 Device: None (Room air)     ACTIVITIES OF DAILY LIVING: I Mod I S SBA CGA Min Mod Max Total NT Comments   BASIC ADLs:              Bathing/ Showering [] [] [] [] [] [] [] [] [] [x]    Toileting [] [] [] [] [] [] [] [] [] [x]    Dressing [] [] [] [] [] [] [] [] [] [x]    Feeding [] [] [] [] [] [] [] [] [] [x]    Grooming [] [] [] [] [] [x] [] [] [] []    Personal Device Care [] [] [] [] [] [] [] [] [] [x]    Functional Mobility [] [] [] [x] [x] [] [] [] [] []    I=Independent, Mod I=Modified Independent, S=Supervision, SBA=Standby Assistance, CGA=Contact Guard Assistance,   Min=Minimal Assistance, Mod=Moderate Assistance, Max=Maximal Assistance, Total=Total Assistance, NT=Not Tested    MOBILITY: I Mod I S SBA CGA Min Mod Max Total  NT x2 Comments:   Supine to sit [] [] [] [] [] [] [] [] [] [x] []    Sit to supine [] [] [] [] [] [] [] [] [] [x] []    Sit to stand [] [] [] [x] [x] [] [] [] [] [] []    Bed to chair [] [] [] [x] [x] [] [] [] [] [] []    I=Independent, Mod I=Modified Independent, S=Supervision, SBA=Standby Assistance, CGA=Contact Guard Assistance,   Min=Minimal Assistance, Mod=Moderate Assistance, Max=Maximal Assistance, Total=Total Assistance, NT=Not Tested    BALANCE: Good Fair+ Fair Fair- Poor NT Comments   Sitting Static [] [x] [] [] [] []    Sitting Dynamic [] [x] [] [] [] []              Standing Static [] [] [x] [] [] []    Standing Dynamic [] [] [x] [] [] []      PLAN:   FREQUENCY/DURATION: OT Plan of Care: 3 times/week for duration of hospital stay or until stated goals are met, whichever comes first.    TREATMENT:   TREATMENT:   ( $$ Therapeutic Activity: 8-22 mins  $$ Therapeutic Exercises: 8-22 mins   )  Therapeutic Activity (15 Minutes): Therapeutic activity included Transfer Training, Ambulation on level ground, Sitting balance , Standing balance and Grooming Task to improve functional Mobility, Strength and Activity tolerance. Therapeutic Exercise (10 Minutes): Therapeutic exercises noted below to improve functional activity tolerance, strength and mobility.      TREATMENT GRID:   Date:  11/18/21 Date:   Date:     Activity/Exercise Parameters Parameters Parameters   Shoulder Abd/Adduction 10 reps     Shoulder Flexion 10 reps     Elbow Flexion 10 reps     Punches 10 reps                         AFTER TREATMENT POSITION/PRECAUTIONS:  Needs within reach, Visitors at bedside and pt sitting edge of bed    INTERDISCIPLINARY COLLABORATION:  RN/PCT and OT/CAMPOS    TOTAL TREATMENT DURATION:  OT Patient Time In/Time Out  Time In: 1400  Time Out: ROCK Mahajan

## 2021-11-19 ENCOUNTER — HOME HEALTH ADMISSION (OUTPATIENT)
Dept: HOME HEALTH SERVICES | Facility: HOME HEALTH | Age: 76
End: 2021-11-19
Payer: MEDICARE

## 2021-11-19 LAB
ANION GAP SERPL CALC-SCNC: 7 MMOL/L (ref 7–16)
BASOPHILS # BLD: 0 K/UL (ref 0–0.2)
BASOPHILS NFR BLD: 1 % (ref 0–2)
BUN SERPL-MCNC: 5 MG/DL (ref 8–23)
CALCIUM SERPL-MCNC: 8.3 MG/DL (ref 8.3–10.4)
CHLORIDE SERPL-SCNC: 101 MMOL/L (ref 98–107)
CO2 SERPL-SCNC: 27 MMOL/L (ref 21–32)
CREAT SERPL-MCNC: 0.83 MG/DL (ref 0.6–1)
DIFFERENTIAL METHOD BLD: ABNORMAL
EOSINOPHIL # BLD: 0.2 K/UL (ref 0–0.8)
EOSINOPHIL NFR BLD: 4 % (ref 0.5–7.8)
ERYTHROCYTE [DISTWIDTH] IN BLOOD BY AUTOMATED COUNT: 13.9 % (ref 11.9–14.6)
GLUCOSE SERPL-MCNC: 119 MG/DL (ref 65–100)
HCT VFR BLD AUTO: 38 % (ref 35.8–46.3)
HGB BLD-MCNC: 12.5 G/DL (ref 11.7–15.4)
IMM GRANULOCYTES # BLD AUTO: 0 K/UL (ref 0–0.5)
IMM GRANULOCYTES NFR BLD AUTO: 0 % (ref 0–5)
LYMPHOCYTES # BLD: 1.4 K/UL (ref 0.5–4.6)
LYMPHOCYTES NFR BLD: 27 % (ref 13–44)
MCH RBC QN AUTO: 31.4 PG (ref 26.1–32.9)
MCHC RBC AUTO-ENTMCNC: 32.9 G/DL (ref 31.4–35)
MCV RBC AUTO: 95.5 FL (ref 79.6–97.8)
MONOCYTES # BLD: 0.7 K/UL (ref 0.1–1.3)
MONOCYTES NFR BLD: 14 % (ref 4–12)
NEUTS SEG # BLD: 2.9 K/UL (ref 1.7–8.2)
NEUTS SEG NFR BLD: 55 % (ref 43–78)
NRBC # BLD: 0 K/UL (ref 0–0.2)
PLATELET # BLD AUTO: 165 K/UL (ref 150–450)
PMV BLD AUTO: 9.9 FL (ref 9.4–12.3)
POTASSIUM SERPL-SCNC: 3.4 MMOL/L (ref 3.5–5.1)
RBC # BLD AUTO: 3.98 M/UL (ref 4.05–5.2)
SODIUM SERPL-SCNC: 135 MMOL/L (ref 136–145)
WBC # BLD AUTO: 5.3 K/UL (ref 4.3–11.1)

## 2021-11-19 PROCEDURE — 36415 COLL VENOUS BLD VENIPUNCTURE: CPT

## 2021-11-19 PROCEDURE — 74011250637 HC RX REV CODE- 250/637: Performed by: PHYSICIAN ASSISTANT

## 2021-11-19 PROCEDURE — 74011250637 HC RX REV CODE- 250/637: Performed by: INTERNAL MEDICINE

## 2021-11-19 PROCEDURE — 74011250636 HC RX REV CODE- 250/636: Performed by: FAMILY MEDICINE

## 2021-11-19 PROCEDURE — 74011250637 HC RX REV CODE- 250/637: Performed by: NURSE PRACTITIONER

## 2021-11-19 PROCEDURE — 85025 COMPLETE CBC W/AUTO DIFF WBC: CPT

## 2021-11-19 PROCEDURE — 80048 BASIC METABOLIC PNL TOTAL CA: CPT

## 2021-11-19 PROCEDURE — 74011250636 HC RX REV CODE- 250/636: Performed by: INTERNAL MEDICINE

## 2021-11-19 RX ORDER — SUCRALFATE 1 G/1
1 TABLET ORAL
Qty: 120 TABLET | Refills: 1 | Status: SHIPPED | OUTPATIENT
Start: 2021-11-19 | End: 2022-01-18

## 2021-11-19 RX ORDER — HYDROCODONE BITARTRATE AND ACETAMINOPHEN 5; 325 MG/1; MG/1
1 TABLET ORAL
Status: DISCONTINUED | OUTPATIENT
Start: 2021-11-19 | End: 2021-11-19 | Stop reason: HOSPADM

## 2021-11-19 RX ORDER — HYDROCODONE BITARTRATE AND ACETAMINOPHEN 5; 325 MG/1; MG/1
1 TABLET ORAL
Qty: 20 TABLET | Refills: 0 | Status: SHIPPED | OUTPATIENT
Start: 2021-11-19 | End: 2021-11-22

## 2021-11-19 RX ORDER — LEVETIRACETAM 500 MG/1
500 TABLET ORAL 2 TIMES DAILY
Qty: 60 TABLET | Refills: 1 | Status: SHIPPED | OUTPATIENT
Start: 2021-11-19 | End: 2022-01-18

## 2021-11-19 RX ORDER — MORPHINE SULFATE 2 MG/ML
1 INJECTION, SOLUTION INTRAMUSCULAR; INTRAVENOUS
Status: DISCONTINUED | OUTPATIENT
Start: 2021-11-19 | End: 2021-11-19 | Stop reason: HOSPADM

## 2021-11-19 RX ADMIN — HYDROCODONE BITARTRATE AND ACETAMINOPHEN 1 TABLET: 5; 325 TABLET ORAL at 13:04

## 2021-11-19 RX ADMIN — HYDROCHLOROTHIAZIDE 25 MG: 25 TABLET ORAL at 11:04

## 2021-11-19 RX ADMIN — SUCRALFATE 1 G: 1 TABLET ORAL at 06:25

## 2021-11-19 RX ADMIN — ONDANSETRON 4 MG: 2 INJECTION INTRAMUSCULAR; INTRAVENOUS at 01:44

## 2021-11-19 RX ADMIN — CYANOCOBALAMIN TAB 1000 MCG 500 MCG: 1000 TAB at 06:25

## 2021-11-19 RX ADMIN — MORPHINE SULFATE 1 MG: 2 INJECTION, SOLUTION INTRAMUSCULAR; INTRAVENOUS at 06:34

## 2021-11-19 RX ADMIN — CHOLESTYRAMINE 4 G: 4 POWDER, FOR SUSPENSION ORAL at 11:08

## 2021-11-19 RX ADMIN — Medication 5 ML: at 06:25

## 2021-11-19 RX ADMIN — SUCRALFATE 1 G: 1 TABLET ORAL at 12:29

## 2021-11-19 RX ADMIN — ONDANSETRON 4 MG: 2 INJECTION INTRAMUSCULAR; INTRAVENOUS at 06:34

## 2021-11-19 RX ADMIN — CARVEDILOL 25 MG: 12.5 TABLET, FILM COATED ORAL at 11:04

## 2021-11-19 RX ADMIN — LEVETIRACETAM 500 MG: 500 TABLET, FILM COATED ORAL at 11:05

## 2021-11-19 RX ADMIN — ONDANSETRON 4 MG: 2 INJECTION INTRAMUSCULAR; INTRAVENOUS at 12:32

## 2021-11-19 RX ADMIN — Medication 400 MG: at 11:04

## 2021-11-19 RX ADMIN — MORPHINE SULFATE 1 MG: 2 INJECTION, SOLUTION INTRAMUSCULAR; INTRAVENOUS at 01:44

## 2021-11-19 RX ADMIN — VITAMIN D, TAB 1000IU (100/BT) 2000 UNITS: 25 TAB at 11:04

## 2021-11-19 RX ADMIN — LEVOTHYROXINE, LIOTHYRONINE 180 MG: 38; 9 TABLET ORAL at 11:04

## 2021-11-19 RX ADMIN — ACETAMINOPHEN 650 MG: 325 TABLET ORAL at 12:32

## 2021-11-19 RX ADMIN — PANTOPRAZOLE SODIUM 40 MG: 40 TABLET, DELAYED RELEASE ORAL at 06:25

## 2021-11-19 NOTE — DISCHARGE SUMMARY
Hospitalist Discharge Summary   Admit Date:  2021  5:28 PM   DC Note date: 2021  Name:  Juju Lechuga   Age:  78 y.o. Sex:  female  :  1945   MRN:  [id number]   Room:  [facility]  PCP:  Bernard Herzog MD    Presenting Complaint: Abdominal Pain    Initial Admission Diagnosis: Subdural hematoma ([facility] [A75.8K0J]     Problem List for this Hospitalization:  Hospital Problems as of 2021 Date Reviewed: 7/15/2021          Codes Class Noted - Resolved POA    Chronic diarrhea ICD-10-CM: K52.9  ICD-9-CM: 787.91  11/15/2021 - Present Unknown        Orbital floor fracture ([facility] ICD-10-CM: S02.30XA  ICD-9-CM: 802.6  11/15/2021 - Present Yes        Maxillary sinus fracture ([facility] ICD-10-CM: S02.401A  ICD-9-CM: 802.4  11/15/2021 - Present Yes        * (Principal) Subdural hematoma ([facility] ICD-10-CM: Q01.0F5T  ICD-9-CM: 432.1  2021 - Present Unknown        Abdominal pain ICD-10-CM: R10.9  ICD-9-CM: 789.00  2021 - Present Unknown        Peptic ulcer disease ICD-10-CM: K27.9  ICD-9-CM: 533.90  2016 - Present Unknown    Overview Signed 2016  1:45 PM by Philly Batista     1. EGD May 2015 with several ulcers. Dyslipidemia (Chronic) ICD-10-CM: E78.5  ICD-9-CM: 272.4  2014 - Present Yes        Hypothyroidism ICD-10-CM: E03.9  ICD-9-CM: 244.9  2013 - Present Yes        GERD (gastroesophageal reflux disease) (Chronic) ICD-10-CM: K21.9  ICD-9-CM: 530.81  2013 - Present Yes    Overview Signed 2013  4:43 PM by Chel Matos NP     S/p Nissen Fundoplication             Essential hypertension ICD-10-CM: I10  ICD-9-CM: 401.9  2013 - Present Yes    Overview Signed 2016  1:44 PM by Byron Lazcano     1. Echo (14) : Normal LV systolic function. EF 55-60%. Normal diastolic function. RVSP 40-45. Mild to moderate tricuspid regurgitation. Mild mitral regurgitation. 2.  Renal artery US (14): No evidence of renal artery stenosis.     3. Plasma Metanephrine. (6/25/14): Normal  4. Echo (11/14):  EF 01-43%  Grade 1 diastolic dysfunction. Mild mitral regurgitaiton. Did Patient have Sepsis (YES OR NO): no    Hospital Course:  Francisco Spears is a 78 y. o. female with medical history of chronic pancreatitis,conversion disorder,  hx of kidney stones, GERD, PUD, hx of TIA, IBS, who presented with report of abdominal pain and headache x 2 weeks. Patient had a fall 11 days ago. Said she had a trip and fall in the kitchen and landed on her face, knocking out two of her teeth. Reports she did not go see the any doctors because she was embarrased by her appearance after the fall. Has subsequently had near constant diffuse frontal headache. Denies vision changes, difficulty with speech or swallow. Does admit to frequent vertigo symptoms. Family points out some mild memory loss over details in story (?baseline). Also of importance, she had histories of falls; last fall about 1 year ago where she was seen in [place] [facility]. Pt was found with a SDH, sent home and instructed f/u with neurosurgery which pt failed to comply with.     Patient also has a hx of chronic abd pain and chronic pancreatitis, s/p pancreatic stent placement that was removed during ERCP on 9/1/2021. Also noted to have antral erosions and started on carafate at that time. Patient reports her abdominal pain returned 2 weeks ago and described as epigastric. She reports she has dry heaving after every attempt at PO intake and says everything she eats \"goes right through her\" - typically with 4-6 watery stools daily. Has been taken ibuprofen 4-5 x daily for abdominal pain and headaches.       ER workup notable for wbc 6.2, hgb 13, plts 286, Na 132, K 4.4, Cl 99, bicarb 22, BUN 6, Cr 0.79, Lipase 80, LA 3.2;  CT AP- unremarkable, CT head with acute / subacute right SDH with right to left midline shift ~6mm.  She was also found with multiple fractures of left maxillary sinus and nondisplaced fracture of left orbital floor. \"  CT cspine non acute. Pt was admitted to the hospital for SDH. Ms. Iris Solis was seen by Dr. Baljinder Gonzalez neurosurgery for the SDH / mid-line shift and per his recommendations, agree with anti-seizure medications, avoidance of antiplatelet / anticoagulants and repeat CT head 7-10 days (last CT head 11/16/2021) and f/u outpatient in office. Pt was seen by PT/OT and recommended for patient to resume home health therapy.     Pertaining to Ms. Spears's facial fractures / left orbital floor fx / maxillary fx which she sustained from the fall, pt was seen by ENT Dr. Gauri Nuñez. Per ENT's 11/16/2021 note, nasal saline misting spray bid, avoid blowing nose and outpatient f/u as needed. Pt was also seen by Dr. Ralph Piedra for her chronic abdominal pain / chronic pancreatitis. Pt underwent an EGD 11/16/2021 with findings of hiatal hernia and gastric ulcers, no bleeding. GI recommends continued PPI, avoidance of NSAIDs, cont questran and repeat EGD 8-12 weeks; GI can f/u path for H. Pylori on outpatient setting. Pt was started on clear liquid diet and instructed to advance diet as tolerated. As pt has ongoing CHRONIC diarrhea, she had a specimen sent for fecal elastase which can also be followed on an outpatient setting. Today pt is medically stable for discharge home. She is agreeable to oral norco for pain mgmt of her facial fractures as she's been receiving IV morphine since admission. She can f/u with her PCP for ongoing pain mgmt. Disposition: Home or Self Care  Diet: ADULT DIET Clear Liquid  Code Status: Full Code    Follow Up Orders:   Follow-up Appointments   Procedures    FOLLOW UP VISIT Appointment in: One Week PCP one week Neurosurgery (Dr. Baljinder Gonzalez) 3 weeks GI Dr. Ricardo stewart 4 weeks     PCP one week  Neurosurgery (Dr. Baljinder Gonzalez) 3 weeks  GI Dr. Ricardo stweart 4 weeks     Standing Status:   Standing     Number of Occurrences:   1     Order Specific Question: Appointment in     Answer: One Week       Follow-up Information     Follow up With Specialties Details Why Contact Info    Mesha Bennett MD Neurosurgery On 12/9/2021 Follow up @ 12:45 [place]  [address] [facility]  [place] [address]      Cruz Conroy MD Gastroenterology On 1/10/2022 Follow up @ 8:45am [address] [facility]  Cyndi Allen MD Internal Medicine On 11/23/2021 Follow up @ 14:45 [address] health therapy services. A home health representative will contact you to schedule the initial home visit. [id number]  [phone]          Follow up labs/diagnostics (ultimately defer to outpatient provider):  CT head one week    Time spent in patient discharge and coordination 60minutes. Plan was discussed with pt, RN, case mgmt. All questions answered. Patient was stable at time of discharge. Instructions given to call a physician or return if any concerns. Discharge Info:   Current Discharge Medication List      START taking these medications    Details   HYDROcodone-acetaminophen (NORCO) 5-325 mg per tablet Take 1 Tablet by mouth every four (4) hours as needed for Pain for up to 3 days. Max Daily Amount: 6 Tablets. Qty: 20 Tablet, Refills: 0  Start date: 11/19/2021, End date: 11/22/2021    Associated Diagnoses: Closed fracture of left orbital floor, sequela (HCC)      levETIRAcetam (KEPPRA) 500 mg tablet Take 1 Tablet by mouth two (2) times a day for 60 days. Indications: seizure precaution  Qty: 60 Tablet, Refills: 1  Start date: 11/19/2021, End date: 1/18/2022    Associated Diagnoses: Subdural hematoma (HCC)      sucralfate (CARAFATE) 1 gram tablet Take 1 Tablet by mouth Before breakfast, lunch, dinner and at bedtime for 60 days.   Qty: 120 Tablet, Refills: 1  Start date: 11/19/2021, End date: 1/18/2022         CONTINUE these medications which have NOT CHANGED    Details   amLODIPine (Norvasc) 5 mg tablet Take 5 mg by mouth every evening. losartan-hydroCHLOROthiazide (Hyzaar) 100-25 mg per tablet Take 1 Tablet by mouth nightly. Cholestyramine-Aspartame (Cholestyramine Light) 4 gram powder MIX 1 SCOOP WITH WATER OR JUICE AND TAKE ORALLY TWICE A DAY FOR CHRONIC DIARRHEA  Qty: 240 g, Refills: 5    Comments: Please consider 90 day supplies to promote better adherence      ketoconazole (NIZORAL) 2 % shampoo SHAMPOO THREE TIMES A WEEK EVERY 4 WEEKS. Qty: 240 mL, Refills: 2      Thyroid, Pork, ([place] Thyroid) 180 mg tab Take 180 mg by mouth daily. clobetasoL (TEMOVATE) 0.05 % external solution Apply 1 Dose to affected area two (2) times a day. Qty: 50 mL, Refills: 5    Comments: DX Code Needed  . Associated Diagnoses: Rash      carvediloL (COREG) 25 mg tablet Take 1 Tab by mouth two (2) times daily (with meals). Qty: 180 Tab, Refills: 3      promethazine (PHENERGAN) 50 mg tablet Take 1 Tab by mouth every six (6) hours as needed for Nausea. Qty: 30 Tab, Refills: 3    Associated Diagnoses: Intractable migraine with aura without status migrainosus      magnesium oxide (MAG-OX) 400 mg tablet Take 400 mg by mouth two (2) times a day. PRASTERONE, DHEA, (DHEA PO) Take 100 mg by mouth daily. cyanocobalamin (VITAMIN B-12) 500 mcg tablet Take 500 mcg by mouth every morning. cholecalciferol, vitamin D3, (VITAMIN D3) 2,000 unit Tab Take 1 Tab by mouth every morning.            STOP taking these medications       aspirin (ASPIRIN) 325 mg tablet Comments:   Reason for Stopping:               Procedures done this admission:  Procedure(s):  ESOPHAGOGASTRODUODENOSCOPY (EGD)/ 23/ ICU 3105  ESOPHAGOGASTRODUODENAL (EGD) BIOPSY    Consults this admission:  IP CONSULT TO GASTROENTEROLOGY  IP CONSULT TO OTOLARYNGOLOGY    Echocardiogram/EKG results:  No results found for this or any previous visit. EKG Results     None          Diagnostic Imaging/Tests:   CT HEAD WO CONT    Result Date: 11/15/2021  CT HEAD WITHOUT CONTRAST. INDICATION: Worsening headache and subdural hematoma. COMPARISON: Exam earlier today and yesterday's exam  TECHNIQUE:   5 mm axial scans from the skull base to the vertex. Our CT scanners use one or more of the following:  Automated exposure control, adjustment of the mA and or kV according to patient size, iterative reconstruction. FINDINGS:  Subdural fluid collection on the right with small amount of high attenuation remains. Partial effacement of the right lateral ventricle. Mild, about 5 mm leftward midline shift is similar. Posterior fossa: Unremarkable. Included portion of the: Paranasal sinuses and mastoid air cells demonstrate opacified left maxillary sinus. Globes and orbits grossly also unremarkable. Stable mixed attenuation, right subdural hematoma. CT HEAD WO CONT    Result Date: 11/15/2021  Noncontrast CT of the brain. COMPARISON: November 14, 2021 INDICATION: Subdural hemorrhage, follow-up TECHNIQUE: Contiguous axial images were obtained from the skull base through the vertex without IV contrast. Radiation dose reduction techniques were used for this study:  Our CT scanners use one or all of the following: Automated exposure control, adjustment of the mA and/or kVp according to patient's size, iterative reconstruction. FINDINGS: There is acute on subacute subdural hemorrhage along the right convexity, measuring up to 14 mm at the maximal thickness. There is mass effect on the brain parenchyma and approximately 5 mm right-to-left midline shift. There is no hydrocephalus. Periventricular hypodensities, nonspecific and likely due to chronic small vessel changes. Cerebellum and brainstem are grossly unremarkable. Included globes appear intact. There is complete opacification of the left maxillary sinus. The mastoid air cells are aerated. Sounding bones are stable. 1. Acute-on-subacute right-sided subdural hemorrhage (approximately 14 mm at the maximal thickness). Overall size of the subdural collection appears similar to prior exam; however the acute component has increased when compared to CT from yesterday. Associated mass effect and 5 mm right-to-left midline shift. CT HEAD WO CONT    Result Date: 11/14/2021  CT HEAD, AND CERVICAL SPINE WITHOUT CONTRAST, 11/14/2021. History: Fall 8 days ago. Comparison: None. Technique:   5 mm axial scans from the skull base to the vertex of the head, axial 2.5 mm scans from above the orbits through the mandible with coronal reconstructed images were also performed of the face, and 1.25 mm axial scans from the skull base into the upper chest performed, and sagittal and coronal reconstructed images performed of the cervical spine. All CT scans performed at this facility use one or all of the following: Automated exposure control, adjustment of the mA and/or kVp according to patient's size, iterative reconstruction. CT HEAD: Findings: The mixed attenuation subdural collection is seen along the right lateral high convexity which demonstrates hyperdense components. The appearance is most concerning for an acute on chronic subdural hematoma. This measures up to 13 mm in width. Mild mass effect is seen with right to left midline shift measuring 6 mm at this time. .  The ventricles are normal in size and configuration. No evidence of midline shift or obvious mass effect is seen. No abnormal edema pattern is seen in a vascular distribution to suggest large artery infarction. Evaluation with bone windows shows no acute osseous abnormality of the bony calvarium. No abnormal fluid collections are seen associated with the aerated sinuses. CT FACIAL BONES :Multiple fractures are suggested of the left maxillary sinus.  This is most clearly demonstrated in the posterior wall of the maxillary sinus although does appear to extend through the left orbital floor seen on axial image 48. No abnormal depression of the orbital floor is currently seen. Some component of fracture line appears to extend to the most anterior aspect of the left zygoma seen on axial image 46. Inferior fracture line extends to the root of the left second maxillary premolar. Fluid is seen within the left axillary sinus which may represent secondary fluid. No acute facial bone fractures otherwise seen. CT CERVICAL SPINE: The skull base is grossly intact. No acute fracture line of the cervical spine is directly visualized. Only chronic degenerative changes are seen with moderate to advanced degenerative changes throughout the cervical spine involving both disc levels and posterior facets. Mild alignment abnormality is are seen without acute findings of posterior elements likely related to facet hypertrophic degenerative changes. .  The dens is intact, and the pre dens space is normal. Prevertebral soft tissues are normal. Limited evaluation of the lung apices shows no gross abnormalities. 1.  Acute on chronic right subdural hematoma with mild right to left midline shift measuring 6 mm at the septum pellucidum. 2. Multiple fractures the left maxillary sinus to include a nondisplaced fracture of the left orbital floor as described above. 3. No acute osseous abnormality of the cervical spine. Only relatively advanced chronic degenerative changes are seen. This report was made using voice transcription. Despite my best efforts to avoid any, transcription errors may persist. If there is any question about the accuracy of the report or need for clarification, then please call [phone], or text me through [email or web address] for clarification or correction. The Critical findings acute intracranial hemorrhage was discussed with Dr. Dada Carbajal, by myself personally at 7:40 PM on 11/14/2021.     CT MAXILLOFACIAL WO CONT    Result Date: 11/14/2021  CT HEAD, AND CERVICAL SPINE WITHOUT CONTRAST, 11/14/2021. History: Fall 8 days ago. Comparison: None. Technique:   5 mm axial scans from the skull base to the vertex of the head, axial 2.5 mm scans from above the orbits through the mandible with coronal reconstructed images were also performed of the face, and 1.25 mm axial scans from the skull base into the upper chest performed, and sagittal and coronal reconstructed images performed of the cervical spine. All CT scans performed at this facility use one or all of the following: Automated exposure control, adjustment of the mA and/or kVp according to patient's size, iterative reconstruction. CT HEAD: Findings: The mixed attenuation subdural collection is seen along the right lateral high convexity which demonstrates hyperdense components. The appearance is most concerning for an acute on chronic subdural hematoma. This measures up to 13 mm in width. Mild mass effect is seen with right to left midline shift measuring 6 mm at this time. .  The ventricles are normal in size and configuration. No evidence of midline shift or obvious mass effect is seen. No abnormal edema pattern is seen in a vascular distribution to suggest large artery infarction. Evaluation with bone windows shows no acute osseous abnormality of the bony calvarium. No abnormal fluid collections are seen associated with the aerated sinuses. CT FACIAL BONES :Multiple fractures are suggested of the left maxillary sinus. This is most clearly demonstrated in the posterior wall of the maxillary sinus although does appear to extend through the left orbital floor seen on axial image 48. No abnormal depression of the orbital floor is currently seen. Some component of fracture line appears to extend to the most anterior aspect of the left zygoma seen on axial image 46. Inferior fracture line extends to the root of the left second maxillary premolar.  Fluid is seen within the left axillary sinus which may represent secondary fluid. No acute facial bone fractures otherwise seen. CT CERVICAL SPINE: The skull base is grossly intact. No acute fracture line of the cervical spine is directly visualized. Only chronic degenerative changes are seen with moderate to advanced degenerative changes throughout the cervical spine involving both disc levels and posterior facets. Mild alignment abnormality is are seen without acute findings of posterior elements likely related to facet hypertrophic degenerative changes. .  The dens is intact, and the pre dens space is normal. Prevertebral soft tissues are normal. Limited evaluation of the lung apices shows no gross abnormalities. 1.  Acute on chronic right subdural hematoma with mild right to left midline shift measuring 6 mm at the septum pellucidum. 2. Multiple fractures the left maxillary sinus to include a nondisplaced fracture of the left orbital floor as described above. 3. No acute osseous abnormality of the cervical spine. Only relatively advanced chronic degenerative changes are seen. This report was made using voice transcription. Despite my best efforts to avoid any, transcription errors may persist. If there is any question about the accuracy of the report or need for clarification, then please call [phone], or text me through [email or web address] for clarification or correction. The Critical findings acute intracranial hemorrhage was discussed with Dr. Alexis Quintanilla, by myself personally at 7:40 PM on 11/14/2021. CT SPINE CERV WO CONT    Result Date: 11/14/2021  CT HEAD, AND CERVICAL SPINE WITHOUT CONTRAST, 11/14/2021. History: Fall 8 days ago. Comparison: None.  Technique:   5 mm axial scans from the skull base to the vertex of the head, axial 2.5 mm scans from above the orbits through the mandible with coronal reconstructed images were also performed of the face, and 1.25 mm axial scans from the skull base into the upper chest performed, and sagittal and coronal reconstructed images performed of the cervical spine. All CT scans performed at this facility use one or all of the following: Automated exposure control, adjustment of the mA and/or kVp according to patient's size, iterative reconstruction. CT HEAD: Findings: The mixed attenuation subdural collection is seen along the right lateral high convexity which demonstrates hyperdense components. The appearance is most concerning for an acute on chronic subdural hematoma. This measures up to 13 mm in width. Mild mass effect is seen with right to left midline shift measuring 6 mm at this time. .  The ventricles are normal in size and configuration. No evidence of midline shift or obvious mass effect is seen. No abnormal edema pattern is seen in a vascular distribution to suggest large artery infarction. Evaluation with bone windows shows no acute osseous abnormality of the bony calvarium. No abnormal fluid collections are seen associated with the aerated sinuses. CT FACIAL BONES :Multiple fractures are suggested of the left maxillary sinus. This is most clearly demonstrated in the posterior wall of the maxillary sinus although does appear to extend through the left orbital floor seen on axial image 48. No abnormal depression of the orbital floor is currently seen. Some component of fracture line appears to extend to the most anterior aspect of the left zygoma seen on axial image 46. Inferior fracture line extends to the root of the left second maxillary premolar. Fluid is seen within the left axillary sinus which may represent secondary fluid. No acute facial bone fractures otherwise seen. CT CERVICAL SPINE: The skull base is grossly intact. No acute fracture line of the cervical spine is directly visualized. Only chronic degenerative changes are seen with moderate to advanced degenerative changes throughout the cervical spine involving both disc levels and posterior facets.  Mild alignment abnormality is are seen without acute findings of posterior elements likely related to facet hypertrophic degenerative changes. .  The dens is intact, and the pre dens space is normal. Prevertebral soft tissues are normal. Limited evaluation of the lung apices shows no gross abnormalities. 1.  Acute on chronic right subdural hematoma with mild right to left midline shift measuring 6 mm at the septum pellucidum. 2. Multiple fractures the left maxillary sinus to include a nondisplaced fracture of the left orbital floor as described above. 3. No acute osseous abnormality of the cervical spine. Only relatively advanced chronic degenerative changes are seen. This report was made using voice transcription. Despite my best efforts to avoid any, transcription errors may persist. If there is any question about the accuracy of the report or need for clarification, then please call [phone], or text me through [email or web address] for clarification or correction. The Critical findings acute intracranial hemorrhage was discussed with Dr. Chad Moy, by myself personally at 7:40 PM on 11/14/2021. CT ABD PELV W CONT    Result Date: 11/14/2021  CT ABDOMEN AND PELVIS WITH INTRAVENOUS CONTRAST DATED 11/14/2021. History: Fall 8 days ago. Worsening abdominal pain. Comparison: None. Technique:   Multiple contiguous helical CT images reconstructed at 5 mm intervals were obtained from above the diaphragms through the ischial tuberosities following oral and 100 cc [id number] without acute complication. All CT scans performed at this facility use one or all of the following: Automated exposure control, adjustment of the mA and/or kVp according to patient's size, iterative reconstruction. Findings: CT ABDOMEN:  Limited evaluation of the lung bases and base of the mediastinum demonstrates no significant abnormalities. The Liver appears fatty infiltrated although homogeneous. Pneumobilia seen in the left lobe. The spleen is homogeneous in attenuation.   No contour deforming or enhancing mass lesions are seen of the pancreas or adrenal glands. The gallbladder has been removed. The kidneys enhance symmetrically and no evidence of hydronephrosis is seen. Changes are seen in the proximal stomach which may be related to a prior process such as fundoplication. The visualized loops of small bowel and colon are normal in caliber. The appendix is not clearly seen. No acute inflammatory structures suggested adjacent to the cecal tip. Moderate diverticulosis is seen of the sigmoid colon chest or has well without etiology May be has left-sided sinusitis. 40) symptomatic O that was one follicle in the. No free fluid, free air, or focal inflammatory changes are seen in the abdomen. No adenopathy is seen. The abdominal aorta demonstrates mild atherosclerotic calcification. No retroperitoneal hematoma is seen. . No acute osseous abnormality seen of the lumbar spine. CT PELVIS: No abnormal pelvic fluid collections or inflammatory changes are present. No pelvic adenopathy is seen. The urinary bladder is unremarkable. No acute osseous abnormality is seen of the bony pelvis. 1.  No significant acute findings in the abdomen or pelvis. This report was made using voice transcription. Despite my best efforts to avoid any, transcription errors may persist. If there is any question about the accuracy of the report or need for clarification, then please call [phone], or text me through perfectserv for clarification or correction. All Micro Results     Procedure Component Value Units Date/Time    COVID-19 RAPID TEST [[id number]] Collected: 11/15/21 1757    Order Status: Completed Specimen: Nasopharyngeal Updated: 11/15/21 1822     Specimen source Nasopharyngeal        COVID-19 rapid test Not detected        Comment:      The specimen is NEGATIVE for SARS-CoV-2, the novel coronavirus associated with COVID-19. A negative result does not rule out COVID-19.        This test has been authorized by the FDA under an Emergency Use Authorization (EUA) for use by authorized laboratories. Fact sheet for Healthcare Providers: ConventionUpdate.co.nz  Fact sheet for Patients: ConventionUpdate.co.nz       Methodology: Isothermal Nucleic Acid Amplification         C. DIFFICILE AG & TOXIN A/B [[id number]] Collected: 11/15/21 0045    Order Status: Canceled Specimen: Stool           Labs: Results:       BMP, Mg, Phos Recent Labs     11/19/21 0736 11/18/21  0513 11/17/21  0539   * 137 135*   K 3.4* 3.5 3.3*    105 102   CO2 27 28 28   AGAP 7 4* 5*   BUN 5* 3* 2*   CREA 0.83 0.65 0.61   CA 8.3 8.0* 7.4*   * 94 103*      CBC Recent Labs     11/19/21 0736 11/18/21 0513 11/17/21 0539   WBC 5.3 4.3 4.3   RBC 3.98* 3.93* 3.86*   HGB 12.5 12.4 12.0   HCT 38.0 37.5 36.9    150 169   GRANS 55 57 62   LYMPH 27 23 23   EOS 4 4 4   MONOS 14* 15* 11   BASOS 1 1 1   IG 0 0 1   ANEU 2.9 2.4 2.6   ABL 1.4 1.0 1.0   MERARY 0.2 0.2 0.2   ABM 0.7 0.6 0.5   ABB 0.0 0.0 0.0   AIG 0.0 0.0 0.0      LFT No results for input(s): ALT, TBIL, AP, TP, ALB, GLOB, AGRAT in the last 72 hours.     No lab exists for component: SGOT, GPT   Cardiac Testing Lab Results   Component Value Date/Time    CK 39 10/07/2008 09:15 AM    CK 39 10/07/2008 09:04 AM    CK - MB <0.5 (L) 10/07/2008 09:04 AM    CK-MB Index CANNOT BE CALCULATED 10/07/2008 09:04 AM    Troponin-I, Qt. <0.02 (L) 11/04/2015 10:05 AM    Troponin-I, Qt. <0.02 (L) 05/20/2014 05:20 AM    Troponin-I, Qt. <0.04 (L) 10/08/2008 11:50 PM      Coagulation Tests Lab Results   Component Value Date/Time    Prothrombin time 10.6 06/22/2014 09:20 AM    INR 1.0 06/22/2014 09:20 AM    INR (POC) 1.0 11/04/2015 10:05 AM    aPTT 25.4 06/22/2014 09:20 AM    aPTT 26.4 05/22/2014 05:00 AM    aPTT 41.3 (H) 05/21/2014 03:50 AM      A1c Lab Results   Component Value Date/Time    Hemoglobin A1c 5.3 11/05/2015 05:44 AM      Lipid Panel Lab Results   Component Value Date/Time    Cholesterol, total 162 06/20/2018 04:02 PM    HDL Cholesterol 55 06/20/2018 04:02 PM    LDL, calculated 65 06/20/2018 04:02 PM    VLDL, calculated 42 (H) 06/20/2018 04:02 PM    Triglyceride 212 (H) 06/20/2018 04:02 PM    CHOL/HDL Ratio 3.1 11/05/2015 05:44 AM      Thyroid Panel Lab Results   Component Value Date/Time    TSH 31.000 (H) 11/15/2021 03:21 AM    TSH 18.410 (H) 08/06/2018 02:13 PM    T4, Free 0.5 (L) 11/15/2021 03:21 AM    T4, Free 0.66 (L) 08/06/2018 02:13 PM        Most Recent UA Lab Results   Component Value Date/Time    Color YELLOW 09/02/2011 05:20 AM    Appearance CLEAR 09/02/2011 05:20 AM    pH (UA) 6.0 09/02/2011 05:20 AM    Protein NEGATIVE  09/02/2011 05:20 AM    Glucose NEGATIVE  09/02/2011 05:20 AM    Ketone 40 (A) 09/02/2011 05:20 AM    Bilirubin NEGATIVE  09/02/2011 05:20 AM    Blood NEGATIVE  09/02/2011 05:20 AM    Urobilinogen 0.2 09/02/2011 05:20 AM    Nitrites NEGATIVE  09/02/2011 05:20 AM    Leukocyte Esterase NEGATIVE  09/02/2011 05:20 AM          All Labs from Last 24 Hrs:  Recent Results (from the past 24 hour(s))   METABOLIC PANEL, BASIC    Collection Time: 11/19/21  7:36 AM   Result Value Ref Range    Sodium 135 (L) 136 - 145 mmol/L    Potassium 3.4 (L) 3.5 - 5.1 mmol/L    Chloride 101 98 - 107 mmol/L    CO2 27 21 - 32 mmol/L    Anion gap 7 7 - 16 mmol/L    Glucose 119 (H) 65 - 100 mg/dL    BUN 5 (L) 8 - 23 MG/DL    Creatinine 0.83 0.6 - 1.0 MG/DL    GFR est AA >60 >60 ml/min/1.73m2    GFR est non-AA >60 >60 ml/min/1.73m2    Calcium 8.3 8.3 - 10.4 MG/DL   CBC WITH AUTOMATED DIFF    Collection Time: 11/19/21  7:36 AM   Result Value Ref Range    WBC 5.3 4.3 - 11.1 K/uL    RBC 3.98 (L) 4.05 - 5.2 M/uL    HGB 12.5 11.7 - 15.4 g/dL    HCT 38.0 35.8 - 46.3 %    MCV 95.5 79.6 - 97.8 FL    MCH 31.4 26.1 - 32.9 PG    MCHC 32.9 31.4 - 35.0 g/dL    RDW 13.9 11.9 - 14.6 %    PLATELET 504 228 - 198 K/uL    MPV 9.9 9.4 - 12.3 FL    ABSOLUTE NRBC 0.00 0.0 - 0.2 K/uL    DF AUTOMATED      NEUTROPHILS 55 43 - 78 %    LYMPHOCYTES 27 13 - 44 %    MONOCYTES 14 (H) 4.0 - 12.0 %    EOSINOPHILS 4 0.5 - 7.8 %    BASOPHILS 1 0.0 - 2.0 %    IMMATURE GRANULOCYTES 0 0.0 - 5.0 %    ABS. NEUTROPHILS 2.9 1.7 - 8.2 K/UL    ABS. LYMPHOCYTES 1.4 0.5 - 4.6 K/UL    ABS. MONOCYTES 0.7 0.1 - 1.3 K/UL    ABS. EOSINOPHILS 0.2 0.0 - 0.8 K/UL    ABS. BASOPHILS 0.0 0.0 - 0.2 K/UL    ABS. IMM.  GRANS. 0.0 0.0 - 0.5 K/UL       Current Med List in Hospital:   Current Facility-Administered Medications   Medication Dose Route Frequency    HYDROcodone-acetaminophen (NORCO) 5-325 mg per tablet 1 Tablet  1 Tablet Oral Q4H PRN    morphine injection 1 mg  1 mg IntraVENous Q8H PRN    pantoprazole (PROTONIX) tablet 40 mg  40 mg Oral ACB    amLODIPine (NORVASC) tablet 5 mg  5 mg Oral QPM    carvediloL (COREG) tablet 25 mg  25 mg Oral BID WITH MEALS    cholecalciferol (VITAMIN D3) (1000 Units /25 mcg) tablet 2,000 Units  2,000 Units Oral DAILY    cholestyramine-aspartame (QUESTRAN LIGHT) packet 4 g  4 g Oral BID WITH MEALS    cyanocobalamin tablet 500 mcg  500 mcg Oral 7am    losartan (COZAAR) tablet 100 mg  100 mg Oral DAILY    magnesium oxide (MAG-OX) tablet 400 mg  400 mg Oral BID    thyroid (Pork) (ARMOUR) tablet 180 mg  180 mg Oral DAILY    sodium chloride (NS) flush 5-40 mL  5-40 mL IntraVENous Q8H    sodium chloride (NS) flush 5-40 mL  5-40 mL IntraVENous PRN    acetaminophen (TYLENOL) tablet 650 mg  650 mg Oral Q6H PRN    Or    acetaminophen (TYLENOL) suppository 650 mg  650 mg Rectal Q6H PRN    polyethylene glycol (MIRALAX) packet 17 g  17 g Oral DAILY PRN    sucralfate (CARAFATE) tablet 1 g  1 g Oral AC&HS    hydrALAZINE (APRESOLINE) 20 mg/mL injection 20 mg  20 mg IntraVENous Q6H PRN    hydroCHLOROthiazide (HYDRODIURIL) tablet 25 mg  25 mg Oral DAILY    dextrose 40% (GLUTOSE) oral gel 1 Tube  15 g Oral PRN    glucagon (GLUCAGEN) injection 1 mg  1 mg IntraMUSCular PRN    dextrose (D50W) injection syrg 12.5-25 g  25-50 mL IntraVENous PRN    levETIRAcetam (KEPPRA) tablet 500 mg  500 mg Oral BID    sodium chloride (NS) flush 5-10 mL  5-10 mL IntraVENous Q8H    sodium chloride (NS) flush 5-10 mL  5-10 mL IntraVENous PRN    ondansetron (ZOFRAN) injection 4 mg  4 mg IntraVENous Q4H PRN       Allergies   Allergen Reactions    Benadryl [Diphenhydramine Hcl] Anxiety    Codeine Itching    Compazine [Prochlorperazine Edisylate] Other (comments)     Left face drawing    Nubain [Nalbuphine] Anxiety    Stadol [Butorphanol Tartrate] Anxiety    Talwin [Pentazocine Lactate] Anxiety    Trilafon Anxiety    Ultram [Tramadol] Unknown (comments)     Pt denies     Immunization History   Administered Date(s) Administered    Influenza High Dose Vaccine PF 01/20/2017    Pneumococcal Conjugate (PCV-13) 01/20/2017    Pneumococcal Polysaccharide (PPSV-23) 04/15/2011    TB Skin Test (PPD) Intradermal 11/15/2021       Recent Vital Data:  Patient Vitals for the past 24 hrs:   Temp Pulse Resp BP SpO2   11/19/21 1120 97.3 °F (36.3 °C)       11/19/21 1103  72 18 110/70 92 %   11/19/21 0802 98.1 °F (36.7 °C) 68 16 (!) 104/59 93 %   11/19/21 0442 98 °F (36.7 °C) 82 16 115/71 92 %   11/19/21 0045 98 °F (36.7 °C) 64 16 110/67 93 %   11/19/21 0000  64      11/18/21 2114 98.6 °F (37 °C) 66 16 116/72 90 %   11/18/21 1537 98.3 °F (36.8 °C) 90 19 135/87 96 %     Oxygen Therapy  O2 Sat (%): 92 % (11/19/21 1103)  Pulse via Oximetry: 69 beats per minute (11/16/21 2235)  O2 Device: None (Room air) (11/16/21 1903)  Skin Assessment: Clean, dry, & intact (11/15/21 2302)  O2 Flow Rate (L/min): 3 l/min (11/16/21 1043)    Estimated body mass index is 22.86 kg/m² as calculated from the following:    Height as of this encounter: 5' 2.5\" (1.588 m). Weight as of this encounter: 57.6 kg (126 lb 15.8 oz).     Intake/Output Summary (Last 24 hours) at 11/19/2021 1218  Last data filed at 11/18/2021 1340  Gross per 24 hour   Intake  Output 750 ml   Net -750 ml         Physical Exam:    General:          Well nourished. No overt distress  Head:               Normocephalic, atraumatic  Eyes:               IMproving ecchymotic areas b/l orbital area; sclerae appear normal.  Pupils equally round. ENT:                Nares appear normal, no drainage. Moist oral mucosa  Neck:               No restricted ROM. Trachea midline   CV:                  RRR. No m/r/g. No jugular venous distension. Lungs:             CTAB. No wheezing, rhonchi, or rales. Respirations even, unlabored  Abdomen: Bowel sounds present. Soft, nontender, nondistended. Extremities:     No cyanosis or clubbing. No edema  Skin:                No rashes and normal coloration. Warm and dry. Neuro:             CN II-XII grossly intact. Sensation intact. A&Ox3  Psych:             Normal mood and affect. Signed:  SKY Forbes    Part of this note may have been written by using a voice dictation software. The note has been proof read but may still contain some grammatical/other typographical errors.

## 2021-11-19 NOTE — DISCHARGE INSTRUCTIONS
Ensure to f/u with GI. Monitor for signs and symptoms of infection, fever malaise. Report to your provider constipation or new onset of nausea.

## 2021-11-19 NOTE — PROGRESS NOTES
Pt is medically cleared for dc to home today. SW met with pt to discuss dc needs and recommendation for [facility] PT/OT services. Demographics, insurance and PCP confirmed. Pt is agreeable to home health services. No agency preference and agreeable to referral to [facility]. Referral submitted for PT/OT services. No other dc needs or concerns identified or reported. SW remains available to assist as needed. Care Management Interventions  PCP Verified by CM: Yes  Last Visit to PCP: 07/15/21  Mode of Transport at Discharge: Other (see comment)  Transition of Care Consult (CM Consult): [address]: Yes  Discharge Durable Medical Equipment: No  Physical Therapy Consult: Yes  Occupational Therapy Consult: Yes  Speech Therapy Consult: No  Support Systems: Spouse/Significant Other, Child(rayne) (pt lives with spouse, son nearby.)  Confirm Follow Up Transport: Family  The Plan for Transition of Care is Related to the Following Treatment Goals : Home health therapy to improve pt's strength and functional abilities to return to baseline.   The Patient and/or Patient Representative was Provided with a Choice of Provider and Agrees with the Discharge Plan?: Yes  Freedom of Choice List was Provided with Basic Dialogue that Supports the Patient's Individualized Plan of Care/Goals, Treatment Preferences and Shares the Quality Data Associated with the Providers?: Yes   Resource Information Provided?:  (MCR/Supplemental)  Discharge Location  Discharge Placement: Home with home health [facility])

## 2021-11-19 NOTE — PROGRESS NOTES
Date of Outreach Update:  Jenise Caraballo was seen and assessed. MEWS Score: 1 (11/18/21 0533)  Vitals:    11/18/21 0648 11/18/21 1055 11/18/21 1537 11/18/21 2114   BP: 131/76 (!) 151/86 135/87 116/72   Pulse: 81 89 90 66   Resp: 19 20 19 16   Temp: 98.5 °F (36.9 °C) 98.6 °F (37 °C) 98.3 °F (36.8 °C) 98.6 °F (37 °C)   SpO2: 96% 98% 96% 90%   Weight:       Height:             Pain Assessment  Pain Intensity 1: 4 (11/18/21 2100)  Pain Location 1: Head  Pain Intervention(s) 1: Medication (see MAR)  Patient Stated Pain Goal: 0      Previous Outreach assessment has been reviewed. There have been no significant clinical changes since the completion of the last dated Outreach assessment. Patient alert and oriented at this time at bedside. Moves all 4 extremities with good strength. 4mm pupil on L/R and reactive bilateral. Patient complains of mild headache. Per [facility] patient receiving morphine/zofran for her headache. Will continue to follow up per outreach protocol.     Signed By:   Mohan Andrade RN    November 18, 2021 10:05 PM

## 2021-11-20 ENCOUNTER — HOME CARE VISIT (OUTPATIENT)
Dept: SCHEDULING | Facility: HOME HEALTH | Age: 76
End: 2021-11-20
Payer: MEDICARE

## 2021-11-20 PROCEDURE — 400018 HH-NO PAY CLAIM PROCEDURE

## 2021-11-20 PROCEDURE — 3331090001 HH PPS REVENUE CREDIT

## 2021-11-20 PROCEDURE — 400013 HH SOC

## 2021-11-20 PROCEDURE — G0151 HHCP-SERV OF PT,EA 15 MIN: HCPCS

## 2021-11-20 PROCEDURE — 3331090002 HH PPS REVENUE DEBIT

## 2021-11-21 VITALS
SYSTOLIC BLOOD PRESSURE: 124 MMHG | OXYGEN SATURATION: 97 % | HEART RATE: 78 BPM | TEMPERATURE: 97.3 F | RESPIRATION RATE: 18 BRPM | DIASTOLIC BLOOD PRESSURE: 74 MMHG

## 2021-11-21 PROCEDURE — 3331090002 HH PPS REVENUE DEBIT

## 2021-11-21 PROCEDURE — 3331090001 HH PPS REVENUE CREDIT

## 2021-11-22 ENCOUNTER — HOME CARE VISIT (OUTPATIENT)
Dept: SCHEDULING | Facility: HOME HEALTH | Age: 76
End: 2021-11-22
Payer: MEDICARE

## 2021-11-22 PROCEDURE — 3331090002 HH PPS REVENUE DEBIT

## 2021-11-22 PROCEDURE — G0151 HHCP-SERV OF PT,EA 15 MIN: HCPCS

## 2021-11-22 PROCEDURE — 3331090001 HH PPS REVENUE CREDIT

## 2021-11-23 VITALS
HEART RATE: 74 BPM | TEMPERATURE: 97.9 F | OXYGEN SATURATION: 98 % | SYSTOLIC BLOOD PRESSURE: 134 MMHG | RESPIRATION RATE: 18 BRPM | DIASTOLIC BLOOD PRESSURE: 82 MMHG

## 2021-11-23 PROCEDURE — 3331090002 HH PPS REVENUE DEBIT

## 2021-11-23 PROCEDURE — 3331090001 HH PPS REVENUE CREDIT

## 2021-11-24 PROCEDURE — 3331090001 HH PPS REVENUE CREDIT

## 2021-11-24 PROCEDURE — 3331090002 HH PPS REVENUE DEBIT

## 2021-11-25 PROCEDURE — 3331090001 HH PPS REVENUE CREDIT

## 2021-11-25 PROCEDURE — 3331090002 HH PPS REVENUE DEBIT

## 2021-11-26 ENCOUNTER — HOME CARE VISIT (OUTPATIENT)
Dept: HOME HEALTH SERVICES | Facility: HOME HEALTH | Age: 76
End: 2021-11-26
Payer: MEDICARE

## 2021-11-26 PROCEDURE — 3331090002 HH PPS REVENUE DEBIT

## 2021-11-26 PROCEDURE — 3331090001 HH PPS REVENUE CREDIT

## 2021-11-27 PROCEDURE — 3331090002 HH PPS REVENUE DEBIT

## 2021-11-27 PROCEDURE — 3331090001 HH PPS REVENUE CREDIT

## 2021-11-28 PROCEDURE — 3331090002 HH PPS REVENUE DEBIT

## 2021-11-28 PROCEDURE — 3331090001 HH PPS REVENUE CREDIT

## 2021-11-28 NOTE — CASE COMMUNICATION
Patient stated she did not want a visit today;left message for M.YUE. of missed visit 11.26.21 at 2:13 p.m.; notified.

## 2021-11-29 PROCEDURE — 3331090002 HH PPS REVENUE DEBIT

## 2021-11-29 PROCEDURE — 3331090001 HH PPS REVENUE CREDIT

## 2021-11-30 VITALS
SYSTOLIC BLOOD PRESSURE: 108 MMHG | RESPIRATION RATE: 18 BRPM | HEIGHT: 63 IN | WEIGHT: 126.98 LBS | HEART RATE: 68 BPM | OXYGEN SATURATION: 92 % | TEMPERATURE: 98 F | BODY MASS INDEX: 22.5 KG/M2 | DIASTOLIC BLOOD PRESSURE: 64 MMHG

## 2021-11-30 PROCEDURE — 3331090001 HH PPS REVENUE CREDIT

## 2021-11-30 PROCEDURE — 3331090002 HH PPS REVENUE DEBIT

## 2021-12-01 PROCEDURE — 3331090002 HH PPS REVENUE DEBIT

## 2021-12-01 PROCEDURE — 3331090001 HH PPS REVENUE CREDIT

## 2021-12-02 ENCOUNTER — HOME CARE VISIT (OUTPATIENT)
Dept: HOME HEALTH SERVICES | Facility: HOME HEALTH | Age: 76
End: 2021-12-02
Payer: MEDICARE

## 2021-12-02 PROCEDURE — 3331090001 HH PPS REVENUE CREDIT

## 2021-12-02 PROCEDURE — 3331090002 HH PPS REVENUE DEBIT

## 2021-12-03 ENCOUNTER — HOME CARE VISIT (OUTPATIENT)
Dept: HOME HEALTH SERVICES | Facility: HOME HEALTH | Age: 76
End: 2021-12-03
Payer: MEDICARE

## 2021-12-03 PROCEDURE — 3331090001 HH PPS REVENUE CREDIT

## 2021-12-03 PROCEDURE — 3331090002 HH PPS REVENUE DEBIT

## 2021-12-04 PROCEDURE — 3331090001 HH PPS REVENUE CREDIT

## 2021-12-04 PROCEDURE — 3331090002 HH PPS REVENUE DEBIT

## 2021-12-05 PROCEDURE — 3331090002 HH PPS REVENUE DEBIT

## 2021-12-05 PROCEDURE — 3331090001 HH PPS REVENUE CREDIT

## 2021-12-05 NOTE — CASE COMMUNICATION
Spouse states Patient is not feeling well and wants to wait until next week for visit;left message for M.D. 12.3.21 at 3:36 p.m. of missed visit. ; notified.

## 2021-12-06 ENCOUNTER — HOME CARE VISIT (OUTPATIENT)
Dept: HOME HEALTH SERVICES | Facility: HOME HEALTH | Age: 76
End: 2021-12-06
Payer: MEDICARE

## 2021-12-06 PROCEDURE — 3331090001 HH PPS REVENUE CREDIT

## 2021-12-06 PROCEDURE — 3331090002 HH PPS REVENUE DEBIT

## 2021-12-07 PROCEDURE — 3331090002 HH PPS REVENUE DEBIT

## 2021-12-07 PROCEDURE — 3331090001 HH PPS REVENUE CREDIT

## 2021-12-08 ENCOUNTER — HOME CARE VISIT (OUTPATIENT)
Dept: SCHEDULING | Facility: HOME HEALTH | Age: 76
End: 2021-12-08
Payer: MEDICARE

## 2021-12-08 PROCEDURE — G0151 HHCP-SERV OF PT,EA 15 MIN: HCPCS

## 2021-12-08 PROCEDURE — 3331090002 HH PPS REVENUE DEBIT

## 2021-12-08 PROCEDURE — 3331090001 HH PPS REVENUE CREDIT

## 2021-12-08 PROCEDURE — 3331090003 HH PPS REVENUE ADJ

## 2021-12-09 PROCEDURE — 3331090002 HH PPS REVENUE DEBIT

## 2021-12-09 PROCEDURE — 3331090001 HH PPS REVENUE CREDIT

## 2021-12-09 NOTE — CASE COMMUNICATION
Patient called and asked if she could be discharged. Notfied  per e-mail. Left message for [email or web address] of missed visit 12.6.21 at 3:45 p. m.

## 2021-12-10 PROCEDURE — 3331090001 HH PPS REVENUE CREDIT

## 2021-12-10 PROCEDURE — 3331090002 HH PPS REVENUE DEBIT

## 2021-12-11 PROCEDURE — 3331090002 HH PPS REVENUE DEBIT

## 2021-12-11 PROCEDURE — 3331090001 HH PPS REVENUE CREDIT

## 2021-12-12 PROCEDURE — 3331090001 HH PPS REVENUE CREDIT

## 2021-12-12 PROCEDURE — 3331090002 HH PPS REVENUE DEBIT

## 2021-12-27 ENCOUNTER — HOSPITAL ENCOUNTER (OUTPATIENT)
Dept: CT IMAGING | Age: 76
Discharge: HOME | End: 2021-12-27
Payer: MEDICARE

## 2021-12-27 PROCEDURE — 70450 CT HEAD/BRAIN W/O DYE: CPT

## 2022-03-18 PROBLEM — S02.401A MAXILLARY SINUS FRACTURE (HCC): Status: ACTIVE | Noted: 2021-11-15

## 2022-03-18 PROBLEM — S02.30XA ORBITAL FLOOR FRACTURE (HCC): Status: ACTIVE | Noted: 2021-11-15

## 2022-03-18 PROBLEM — R53.82 CHRONIC FATIGUE: Status: ACTIVE | Noted: 2017-01-20

## 2022-03-19 PROBLEM — K52.9 CHRONIC DIARRHEA: Status: ACTIVE | Noted: 2021-11-15

## 2022-03-19 PROBLEM — G43.119 INTRACTABLE MIGRAINE WITH AURA WITHOUT STATUS MIGRAINOSUS: Status: ACTIVE | Noted: 2018-04-12

## 2022-03-19 PROBLEM — R10.9 ABDOMINAL PAIN: Status: ACTIVE | Noted: 2021-11-14

## 2022-03-20 PROBLEM — S06.5XAA SUBDURAL HEMATOMA (HCC): Status: ACTIVE | Noted: 2021-11-14

## 2022-06-28 DIAGNOSIS — K52.9 CHRONIC DIARRHEA: Primary | ICD-10-CM

## 2022-06-28 RX ORDER — CHOLESTYRAMINE LIGHT 4 G/5.7G
POWDER, FOR SUSPENSION ORAL
Qty: 240 G | Refills: 5 | Status: SHIPPED | OUTPATIENT
Start: 2022-06-28 | End: 2022-07-01 | Stop reason: SDUPTHER

## 2022-07-01 DIAGNOSIS — K52.9 CHRONIC DIARRHEA: ICD-10-CM

## 2022-07-01 RX ORDER — CHOLESTYRAMINE LIGHT 4 G/5.7G
POWDER, FOR SUSPENSION ORAL
Qty: 240 G | Refills: 5 | Status: SHIPPED | OUTPATIENT
Start: 2022-07-01

## 2022-11-10 ENCOUNTER — OFFICE VISIT (OUTPATIENT)
Dept: ORTHOPEDIC SURGERY | Age: 77
End: 2022-11-10
Payer: MEDICARE

## 2022-11-10 VITALS — HEIGHT: 62 IN | WEIGHT: 128 LBS | BODY MASS INDEX: 23.55 KG/M2

## 2022-11-10 DIAGNOSIS — M25.561 RIGHT KNEE PAIN, UNSPECIFIED CHRONICITY: Primary | ICD-10-CM

## 2022-11-10 DIAGNOSIS — M17.11 PRIMARY OSTEOARTHRITIS OF RIGHT KNEE: ICD-10-CM

## 2022-11-10 DIAGNOSIS — M54.50 LOW BACK PAIN, UNSPECIFIED BACK PAIN LATERALITY, UNSPECIFIED CHRONICITY, UNSPECIFIED WHETHER SCIATICA PRESENT: ICD-10-CM

## 2022-11-10 PROCEDURE — 1090F PRES/ABSN URINE INCON ASSESS: CPT | Performed by: ORTHOPAEDIC SURGERY

## 2022-11-10 PROCEDURE — 3017F COLORECTAL CA SCREEN DOC REV: CPT | Performed by: ORTHOPAEDIC SURGERY

## 2022-11-10 PROCEDURE — G8420 CALC BMI NORM PARAMETERS: HCPCS | Performed by: ORTHOPAEDIC SURGERY

## 2022-11-10 PROCEDURE — G8484 FLU IMMUNIZE NO ADMIN: HCPCS | Performed by: ORTHOPAEDIC SURGERY

## 2022-11-10 PROCEDURE — 99204 OFFICE O/P NEW MOD 45 MIN: CPT | Performed by: ORTHOPAEDIC SURGERY

## 2022-11-10 PROCEDURE — G8400 PT W/DXA NO RESULTS DOC: HCPCS | Performed by: ORTHOPAEDIC SURGERY

## 2022-11-10 PROCEDURE — G8428 CUR MEDS NOT DOCUMENT: HCPCS | Performed by: ORTHOPAEDIC SURGERY

## 2022-11-10 PROCEDURE — 1123F ACP DISCUSS/DSCN MKR DOCD: CPT | Performed by: ORTHOPAEDIC SURGERY

## 2022-11-10 PROCEDURE — 1036F TOBACCO NON-USER: CPT | Performed by: ORTHOPAEDIC SURGERY

## 2022-11-10 RX ORDER — MELOXICAM 15 MG/1
7.5 TABLET ORAL 2 TIMES DAILY
Qty: 60 TABLET | Refills: 0 | Status: CANCELLED | OUTPATIENT
Start: 2022-11-10

## 2022-11-10 RX ORDER — MELOXICAM 7.5 MG/1
7.5 TABLET ORAL 2 TIMES DAILY
Qty: 60 TABLET | Refills: 0 | Status: SHIPPED | OUTPATIENT
Start: 2022-11-10

## 2022-11-10 NOTE — PROGRESS NOTES
are as described below. Nodistal edema, venous stasis changes  There is no lymph adenopathy in the popliteal or malleolar region. Sensation is intact to light touch bilaterally. The gait is noted to be severely antalgic and unsteady. She ambulates walking on her heels with poor balance. Requires holding onto the table or counters ambulating across the room for steadiness. Knee Range of motion is 0 to 120 degrees on the left, 0 120 degrees on the right. The right knee is limited with pain at extreme extension and extreme flexion. AP varus and valgus stressing of the bilateral knees reveal global stability of both knees  ROM of hips is good and painless  Limb lengths are equal.  Hip flexion is normal on the left but weak on the right however greater than gravity. Their judgment and insight are normal.  They are oriented to situation. mood and affect appropriate. X-RAY: AP lateral skier and sunrise views of the right knee reveal narrowing of the medial compartment with subchondral sclerosis. AP lateral spot views of the lumbar spine reveal good lordosis. Grade 1 anterolisthesis of L4 on L5 with multilevel degenerative disc disease and lumbar spondylosis. IMPRESSION: Arthropathy of the right knee, lumbar spondylolisthesis,  lumbar spondylosis, lumbar degenerative disc disease    RECOMMENDATIONS:    Reviewed x-ray findings with the patient. The patient has osteoarthropathy of the right knee. She has pain and swelling associated with it. I have recommended nonoperative treatment for this knee at this point in time which will include injection of cortisone today for temporary improvement and viscosupplementation. The patient may require total knee replacement and we did briefly discuss this. The patient is complaining of right leg pain and numbness in the right foot.   She has severe degenerative disc disease of lumbar spine especially at L4-5 with collapse of the disc space and anterolisthesis of 4 on 5. I believe that this is a much more significant problem than her right knee pain at this point in time. I have recommended an MRI scan of the lumbar spine and a referral to our spine team.  It is important to note the patient did have a fall last October and had a brain bleed. She was in the hospital.  She has been a little bit more unsteady on her feet.   She is currently using a walker which I have encouraged her to do      4--this is a chronic illness/condition with exacerbation

## 2023-07-05 ENCOUNTER — TELEPHONE (OUTPATIENT)
Age: 78
End: 2023-07-05

## 2023-07-05 RX ORDER — CARVEDILOL 25 MG/1
25 TABLET ORAL 2 TIMES DAILY WITH MEALS
Qty: 60 TABLET | Refills: 0 | Status: SHIPPED | OUTPATIENT
Start: 2023-07-05

## 2023-07-05 NOTE — TELEPHONE ENCOUNTER
Patient is requesting a refill. Her  now has alzheimer's and she is emotionally upset. Patient's last office visit was 12/2023.            carvedilol (COREG) 25 MG tablet [[id number]]     Order Details  Dose: 25 mg Route: Oral Frequency: 2 TIMES DAILY WITH MEALS   Dispense Quantity: -- Refills: --          Sig: Take 25 mg by mouth 2 times daily (with meals)     Pharmacy    [facility] [address] [place] [address], [address] [place] [address],[address] [address]   Phone:  [phone]  Fax:  [phone]

## 2023-07-05 NOTE — TELEPHONE ENCOUNTER
Spoke with [facility] pharmacy, does not see that patient is getting Coreg filled anywhere.  Has never filled it at [facility]//elbert    Last seen 10-8-2020

## 2023-07-05 NOTE — TELEPHONE ENCOUNTER
Spoke with patient. She states that her  is in memory care and is having a rough time. Patient states that she also fell in December, broke her shoulder and has been having a time with this. Patient informed that she needs to be seen since she has not been seen since . Appointment scheduled 7-20-23 930 MA. Prescription sent to pharmacy for 30 day supply. Patient informed that she will have to make the appointment for future refills.  Patient voiced understanding//brendab  Requested Prescriptions     Signed Prescriptions Disp Refills    carvedilol (COREG) 25 MG tablet 60 tablet 0     Sig: Take 1 tablet by mouth 2 times daily (with meals)     Authorizing Provider: Ousmane Cervantes     Ordering User: Jagjit Weinberg

## 2023-08-16 ENCOUNTER — TELEPHONE (OUTPATIENT)
Age: 78
End: 2023-08-16

## 2023-08-16 RX ORDER — CARVEDILOL 25 MG/1
25 TABLET ORAL 2 TIMES DAILY WITH MEALS
Qty: 60 TABLET | Refills: 1 | Status: SHIPPED | OUTPATIENT
Start: 2023-08-16

## 2023-08-16 NOTE — TELEPHONE ENCOUNTER
Patient called stating she would like a new prescription written for the following :    carvedilol (COREG) 25 MG tablet  # 90 day supply      Pharmacy      [address], [facility] [address]  [phone]

## 2023-08-16 NOTE — TELEPHONE ENCOUNTER
Prescription sent to pharmacy//[email or web address]  Requested Prescriptions     Signed Prescriptions Disp Refills    carvedilol (COREG) 25 MG tablet 60 tablet 1     Sig: Take 1 tablet by mouth 2 times daily (with meals)     Authorizing Provider: Dominik Deleon     Ordering User: Brenda Morin

## 2023-09-21 ENCOUNTER — TELEPHONE (OUTPATIENT)
Age: 78
End: 2023-09-21

## 2023-09-22 NOTE — TELEPHONE ENCOUNTER
Patient called and informed that we have not seen her since . unable to refill her coreg at this time.  Patient will talk with internist and see if he will refill Coreg until she can make appointment//elbert
Pt had to cancel today's appt, 9/21/23, due to having an upset stomach. Per pt's request/schedule, pt was rescheduled to next available afternoon appt in the [place] office on January 4. In the meantime, pt needs med refills sent to the [facility] in [place].
No

## 2023-10-24 ENCOUNTER — TELEPHONE (OUTPATIENT)
Age: 78
End: 2023-10-24

## 2023-10-24 NOTE — TELEPHONE ENCOUNTER
Patient called stating that she does not have enough Coreg to last her until her appointment in December. Patient states that she did not  prescription sent in in August. I advised patient to call [address] [place] [address] and see if they will fill prescription. Call back if needed. Also recommended that patient keep appointment with DR. Benoit 12-20-23.  Patient voiced understanding//randyab

## 2023-11-10 ENCOUNTER — TELEPHONE (OUTPATIENT)
Dept: INTERNAL MEDICINE CLINIC | Facility: CLINIC | Age: 78
End: 2023-11-10

## 2023-11-10 NOTE — TELEPHONE ENCOUNTER
Pt would like to be scheduled for AWV before end of the year, pt has not been seen since 2021. Where would you like to schedule?

## 2023-11-13 ENCOUNTER — TELEPHONE (OUTPATIENT)
Dept: INTERNAL MEDICINE CLINIC | Facility: CLINIC | Age: 78
End: 2023-11-13

## 2023-11-13 NOTE — TELEPHONE ENCOUNTER
Patient wants to get an appointment for her awv appointment before [facility] 11[facility]. She is needing an afternoon appointment     She has not been here since 2021 last visit was with LINWOOD Hay.   Where would you like her on your schedule ?

## 2023-12-01 ENCOUNTER — OFFICE VISIT (OUTPATIENT)
Dept: INTERNAL MEDICINE CLINIC | Facility: CLINIC | Age: 78
End: 2023-12-01

## 2023-12-01 VITALS
TEMPERATURE: 97.4 F | DIASTOLIC BLOOD PRESSURE: 80 MMHG | HEIGHT: 62 IN | BODY MASS INDEX: 23.04 KG/M2 | WEIGHT: 125.2 LBS | HEART RATE: 67 BPM | SYSTOLIC BLOOD PRESSURE: 171 MMHG | RESPIRATION RATE: 16 BRPM

## 2023-12-01 DIAGNOSIS — Z00.00 MEDICARE ANNUAL WELLNESS VISIT, SUBSEQUENT: Primary | ICD-10-CM

## 2023-12-01 DIAGNOSIS — K21.9 GASTROESOPHAGEAL REFLUX DISEASE WITHOUT ESOPHAGITIS: ICD-10-CM

## 2023-12-01 DIAGNOSIS — K52.9 CHRONIC DIARRHEA: ICD-10-CM

## 2023-12-01 DIAGNOSIS — I10 ESSENTIAL HYPERTENSION: ICD-10-CM

## 2023-12-01 DIAGNOSIS — E55.9 VITAMIN D DEFICIENCY: ICD-10-CM

## 2023-12-01 DIAGNOSIS — R53.82 CHRONIC FATIGUE: ICD-10-CM

## 2023-12-01 DIAGNOSIS — E78.5 DYSLIPIDEMIA: ICD-10-CM

## 2023-12-01 DIAGNOSIS — S42.254D CLOSED NONDISPLACED FRACTURE OF GREATER TUBEROSITY OF RIGHT HUMERUS WITH ROUTINE HEALING, SUBSEQUENT ENCOUNTER: ICD-10-CM

## 2023-12-01 DIAGNOSIS — R68.89 OTHER GENERAL SYMPTOMS AND SIGNS: ICD-10-CM

## 2023-12-01 DIAGNOSIS — E03.9 ACQUIRED HYPOTHYROIDISM: ICD-10-CM

## 2023-12-01 RX ORDER — KETOCONAZOLE 20 MG/ML
SHAMPOO TOPICAL
Qty: 120 ML | Refills: 11 | Status: SHIPPED | OUTPATIENT
Start: 2023-12-01

## 2023-12-01 RX ORDER — CLOBETASOL PROPIONATE 0.5 MG/G
OINTMENT TOPICAL
Qty: 60 G | Refills: 11
Start: 2023-12-01

## 2023-12-01 RX ORDER — DICYCLOMINE HYDROCHLORIDE 10 MG/1
CAPSULE ORAL
COMMUNITY
Start: 2023-11-26

## 2023-12-01 RX ORDER — MONTELUKAST SODIUM 4 MG/1
1 TABLET, CHEWABLE ORAL 2 TIMES DAILY
COMMUNITY
Start: 2023-11-28 | End: 2023-12-01

## 2023-12-01 SDOH — ECONOMIC STABILITY: FOOD INSECURITY: WITHIN THE PAST 12 MONTHS, THE FOOD YOU BOUGHT JUST DIDN'T LAST AND YOU DIDN'T HAVE MONEY TO GET MORE.: NEVER TRUE

## 2023-12-01 SDOH — ECONOMIC STABILITY: FOOD INSECURITY: WITHIN THE PAST 12 MONTHS, YOU WORRIED THAT YOUR FOOD WOULD RUN OUT BEFORE YOU GOT MONEY TO BUY MORE.: NEVER TRUE

## 2023-12-01 SDOH — ECONOMIC STABILITY: HOUSING INSECURITY
IN THE LAST 12 MONTHS, WAS THERE A TIME WHEN YOU DID NOT HAVE A STEADY PLACE TO SLEEP OR SLEPT IN A SHELTER (INCLUDING NOW)?: NO

## 2023-12-01 SDOH — ECONOMIC STABILITY: INCOME INSECURITY: HOW HARD IS IT FOR YOU TO PAY FOR THE VERY BASICS LIKE FOOD, HOUSING, MEDICAL CARE, AND HEATING?: NOT HARD AT ALL

## 2023-12-01 ASSESSMENT — PATIENT HEALTH QUESTIONNAIRE - PHQ9
SUM OF ALL RESPONSES TO PHQ QUESTIONS 1-9: 0
1. LITTLE INTEREST OR PLEASURE IN DOING THINGS: 0
2. FEELING DOWN, DEPRESSED OR HOPELESS: 0
SUM OF ALL RESPONSES TO PHQ QUESTIONS 1-9: 0
SUM OF ALL RESPONSES TO PHQ9 QUESTIONS 1 & 2: 0
SUM OF ALL RESPONSES TO PHQ QUESTIONS 1-9: 0
SUM OF ALL RESPONSES TO PHQ QUESTIONS 1-9: 0

## 2023-12-01 ASSESSMENT — LIFESTYLE VARIABLES
HOW MANY STANDARD DRINKS CONTAINING ALCOHOL DO YOU HAVE ON A TYPICAL DAY: 1 OR 2
HOW OFTEN DO YOU HAVE A DRINK CONTAINING ALCOHOL: MONTHLY OR LESS

## 2023-12-07 ENCOUNTER — TELEPHONE (OUTPATIENT)
Dept: INTERNAL MEDICINE CLINIC | Facility: CLINIC | Age: 78
End: 2023-12-07

## 2023-12-07 NOTE — TELEPHONE ENCOUNTER
Called pt, she states her home phone is messed up and for home health to contact her on the [phone]. Number given to Lucille at [facility].

## 2023-12-07 NOTE — TELEPHONE ENCOUNTER
----- Message from myron Edilberto sent at 12/7/2023  3:01 PM EST -----  Subject: Message to Provider    QUESTIONS  Information for Provider? Candida from [facility] Orders to   scheduled they have not been able to reach the pt there is an restriction   on the phone she was hoping the office could get through. please call Candida   as well   ---------------------------------------------------------------------------  --------------  Yeison ARANDA  [phone]; OK to leave message on voicemail  ---------------------------------------------------------------------------  --------------  SCRIPT ANSWERS  Relationship to Patient? Covered Entity  Covered Entity Type? Home Health Care? Representative Name?  Candida

## 2024-01-05 RX ORDER — CLOBETASOL PROPIONATE 0.46 MG/ML
SOLUTION TOPICAL 2 TIMES DAILY
Qty: 1 EACH | Refills: 5 | Status: SHIPPED | OUTPATIENT
Start: 2024-01-05

## 2024-01-05 RX ORDER — CLOBETASOL PROPIONATE 0.5 MG/G
OINTMENT TOPICAL
Qty: 60 G | Refills: 11 | Status: SHIPPED | OUTPATIENT
Start: 2024-01-05

## 2024-04-11 ENCOUNTER — OFFICE VISIT (OUTPATIENT)
Age: 79
End: 2024-04-11
Payer: MEDICARE

## 2024-04-11 VITALS
DIASTOLIC BLOOD PRESSURE: 98 MMHG | WEIGHT: 122.8 LBS | HEIGHT: 62 IN | BODY MASS INDEX: 22.6 KG/M2 | SYSTOLIC BLOOD PRESSURE: 158 MMHG | HEART RATE: 69 BPM

## 2024-04-11 DIAGNOSIS — M25.511 CHRONIC RIGHT SHOULDER PAIN: ICD-10-CM

## 2024-04-11 DIAGNOSIS — I10 ESSENTIAL HYPERTENSION: Primary | ICD-10-CM

## 2024-04-11 DIAGNOSIS — I63.9 CEREBROVASCULAR ACCIDENT (CVA), UNSPECIFIED MECHANISM (HCC): ICD-10-CM

## 2024-04-11 DIAGNOSIS — G89.29 CHRONIC RIGHT SHOULDER PAIN: ICD-10-CM

## 2024-04-11 PROCEDURE — G8428 CUR MEDS NOT DOCUMENT: HCPCS | Performed by: INTERNAL MEDICINE

## 2024-04-11 PROCEDURE — 1123F ACP DISCUSS/DSCN MKR DOCD: CPT | Performed by: INTERNAL MEDICINE

## 2024-04-11 PROCEDURE — 1090F PRES/ABSN URINE INCON ASSESS: CPT | Performed by: INTERNAL MEDICINE

## 2024-04-11 PROCEDURE — G8420 CALC BMI NORM PARAMETERS: HCPCS | Performed by: INTERNAL MEDICINE

## 2024-04-11 PROCEDURE — 1036F TOBACCO NON-USER: CPT | Performed by: INTERNAL MEDICINE

## 2024-04-11 PROCEDURE — 3080F DIAST BP >= 90 MM HG: CPT | Performed by: INTERNAL MEDICINE

## 2024-04-11 PROCEDURE — 3077F SYST BP >= 140 MM HG: CPT | Performed by: INTERNAL MEDICINE

## 2024-04-11 PROCEDURE — 99203 OFFICE O/P NEW LOW 30 MIN: CPT | Performed by: INTERNAL MEDICINE

## 2024-04-11 PROCEDURE — 93000 ELECTROCARDIOGRAM COMPLETE: CPT | Performed by: INTERNAL MEDICINE

## 2024-04-11 PROCEDURE — G8400 PT W/DXA NO RESULTS DOC: HCPCS | Performed by: INTERNAL MEDICINE

## 2024-04-11 RX ORDER — ASPIRIN 325 MG
325 TABLET ORAL DAILY
COMMUNITY

## 2024-04-11 RX ORDER — CALCIUM CARBONATE 300MG(750)
400 TABLET,CHEWABLE ORAL DAILY
COMMUNITY

## 2024-04-11 RX ORDER — CARVEDILOL 25 MG/1
25 TABLET ORAL 2 TIMES DAILY WITH MEALS
Qty: 60 TABLET | Refills: 1 | Status: SHIPPED | OUTPATIENT
Start: 2024-04-11

## 2024-04-11 RX ORDER — AMLODIPINE BESYLATE 5 MG/1
5 TABLET ORAL DAILY
Qty: 90 TABLET | Refills: 3 | Status: SHIPPED | OUTPATIENT
Start: 2024-04-11

## 2024-04-11 ASSESSMENT — ENCOUNTER SYMPTOMS
LEFT EYE: 0
DIARRHEA: 0
SHORTNESS OF BREATH: 0
COLOR CHANGE: 0

## 2024-04-11 NOTE — PROGRESS NOTES
tablet; Take 1 tablet by mouth 2 times daily (with meals)  Dispense: 60 tablet; Refill: 1  - Echo (TTE) complete (PRN contrast/bubble/strain/3D); Future    2. Chronic right shoulder pain  Refer to orthopedics.  - [facility]    3. Cerebrovascular accident (CVA), unspecified mechanism (HCC)  Continue aspirin.  Discussed importance of blood pressure control.  Plans to see primary care physician for labs.  - Echo (TTE) complete (PRN contrast/bubble/strain/3D); Future             Thank you for allowing me to participate in this patient's care.  Please call or contact me if there are any questions or concerns regarding the above.      Chetan Benoit MD  04/11/24  9:27 AM    This note may have been dictated using speech recognition software.  As a result, error of speech recognition may have occurred

## 2024-05-02 ENCOUNTER — TELEPHONE (OUTPATIENT)
Dept: ORTHOPEDIC SURGERY | Age: 79
End: 2024-05-02

## 2024-05-06 ENCOUNTER — TELEPHONE (OUTPATIENT)
Dept: ORTHOPEDIC SURGERY | Age: 79
End: 2024-05-06

## 2024-05-06 NOTE — TELEPHONE ENCOUNTER
LM for pt to call the office back. Wanted to ask how pt was feeling and ask if they would like to reschedule their missed apt.

## 2024-05-21 ENCOUNTER — TELEPHONE (OUTPATIENT)
Age: 79
End: 2024-05-21

## 2024-05-21 NOTE — TELEPHONE ENCOUNTER
Patient called stating that she has been having bilateral ankles, feet, and toes for 2 weeks after starting amlodipine 4-11-24. Keeps feet propped up, does not help. Swelling usually is down in the morning when she gets up. B/p 116/78. Denies any SOB. Does  not have scales to weigh at home. Has echo scheduled 5/29/24 EA. Tries to watch salt intake.  Patient thinks that the amlodipine is causing the swelling.//elbert      Last office visit 4-11-24

## 2024-05-22 NOTE — TELEPHONE ENCOUNTER
Okay to stop amlodipine and monitor blood pressure.  Call if average blood pressure greater than 140   Patient called with Dr Benoit's response  142/83 before taking medications this morning. Patient informed to take her coreg and lisinopril/hctz then take her blood pressure about 3 hours afterwards patient voiced understanding and thanked me//elbert

## 2024-05-24 ENCOUNTER — TELEPHONE (OUTPATIENT)
Dept: INTERNAL MEDICINE CLINIC | Facility: CLINIC | Age: 79
End: 2024-05-24

## 2024-05-24 NOTE — TELEPHONE ENCOUNTER
Per pt she saw Dr. Benoit Back on 4/11/2024  She was put on amlodipine.  After a week of taking the medication she started having swelling in her feet and ankles.  She states it has gotten worse and starting to go up the leg.  She called the cardiologist on 5/21/2024 and was told to stop the amlodipine and monitor blood pressure. Call if average blood pressure is greater than 140.    Per pt she tried to call her cardiologist today but the office closed at 12pm.  BP today was 138/78

## 2024-05-24 NOTE — TELEPHONE ENCOUNTER
I spoke to the patient this p.m. in regards to her feet swelling.  She states she has had lower extremity edema for several weeks and spoke with cardiology 2 days ago and was told to stop amlodipine.  Her blood pressures are controlled.  She states that her swelling is increased to go above her ankles.  She denies worsening shortness of breath.  She was reassured that this was a side effect that would take time to be relieved however she also states she is drinking a great deal of water a day 5  16 ounce bottles of water.  She is told with decrease that to 3 or 4 bottles limit her salt intake.  She is scheduled for an echo next week  cms

## 2024-05-29 ENCOUNTER — TELEPHONE (OUTPATIENT)
Age: 79
End: 2024-05-29

## 2024-05-29 DIAGNOSIS — I10 ESSENTIAL HYPERTENSION: ICD-10-CM

## 2024-05-29 NOTE — TELEPHONE ENCOUNTER
Patient stated her legs and feet are still swollen, patient stated that she has been off her feet and went of her meds like  told her to do and she still has a lot of swelling she stated.

## 2024-05-30 ENCOUNTER — OFFICE VISIT (OUTPATIENT)
Dept: ORTHOPEDIC SURGERY | Age: 79
End: 2024-05-30

## 2024-05-30 VITALS — HEIGHT: 62 IN | BODY MASS INDEX: 22.45 KG/M2 | WEIGHT: 122 LBS

## 2024-05-30 DIAGNOSIS — M19.111 POST-TRAUMATIC OSTEOARTHRITIS OF RIGHT SHOULDER: ICD-10-CM

## 2024-05-30 DIAGNOSIS — M25.511 RIGHT SHOULDER PAIN, UNSPECIFIED CHRONICITY: Primary | ICD-10-CM

## 2024-05-30 NOTE — PROGRESS NOTES
Name: Jenise Arce  YOB: 1945  Gender: female  MRN: [id number]    CC: Right shoulder pain    HPI:   12/28/2022: Right shoulder greater tuberosity fracture  05/30/2024: Initial visit: Right shoulder pain: Progressive issues over the last 1.5 years    ROS/Meds/PSH/PMH/FH/SH: reviewed today    Tobacco:  reports that she has never smoked. She has never used smokeless tobacco.     Physical Examination:  Patient appears to be alert and oriented with acceptable appearance.  No obvious distress or SOB  CV: appears to have acceptable vascular color and capillary refill  Neuro: appears to have mostly intact light touch sensation   Skin: Right upper extremity = no swelling  MS:   Right shoulder = limited near complete frozen shoulder  Right elbow/wrist/fingers = full motion; 5/5 strength; no instability or crepitance    XR: Right shoulder: AP axillary Grashey views taken today with displaced greater tuberosity fracture; significant glenohumeral arthritis with shoulder subluxation  XR Impression:  As above      Reviewed Test/Records/Documents:   03/07/2013: Dr. Harman: Reflects: Right shoulder pain: Rotator cuff tendinopathy: Shoulder injection  12/28/2022: [facility]: Reflects: Right shoulder injury: Diagnosed: Right shoulder fracture  12/29/2022: Right shoulder x-ray: Radiology impression: Fracture greater tuberosity right humerus  04/11/2024: [facility]: Reflects shoulder injury and marked limitations of movement    Injection: We discussed risks/complications of injection: Due to an injection delaying surgical intervention, plan is for her to discuss with her son but plan an appointment with shoulder replacement specialist: Also concern over Pentazocaine drug allergy and whether she would do well with just steroids and no Xylocaine or Marcaine     Assessment:    Right posttraumatic glenohumeral collapse arthritis; displaced greater tuberosity fracture    Plan:   The patient and I discussed the

## 2024-05-31 ENCOUNTER — TELEPHONE (OUTPATIENT)
Age: 79
End: 2024-05-31

## 2024-05-31 NOTE — TELEPHONE ENCOUNTER
Left detailed message on voice mail per HIPAA with results//brendab    ----- Message from Chetan Benoit MD sent at 5/30/2024  8:10 PM EDT -----  Please call patient.  Her echo shows normal heart function.  Mild mitral regurgitation.  Mild pulmonary hypertension which is common.  Will discuss further at her next visit.  Thank you

## 2024-06-03 NOTE — TELEPHONE ENCOUNTER
Chetan Benoit MD Brizendine, Kyler, LPN  Caller: Unspecified (5 days ago,  4:13 PM)  What medications that she is taking currently for her blood pressure?

## 2024-06-03 NOTE — TELEPHONE ENCOUNTER
Called pt to get a update on her edema, pt stated her swelling has reduced. They are not back to  normal but it is better.     Pt stated she spoke with her PCP to try and see why she was having edema. They found out pt has drinking 106oz of water a day.       Pt has reduced her water to 3x 16oz of water now.     Also pt wants to know can she restart the amlodipine 5mg. She is currently taking the left over, and stated it is working well her to control her BP. Also do you still want her on the Coreg?     BP has been running in the 118/78s range.

## 2024-06-13 ENCOUNTER — TELEPHONE (OUTPATIENT)
Dept: ORTHOPEDIC SURGERY | Age: 79
End: 2024-06-13

## 2024-06-13 NOTE — TELEPHONE ENCOUNTER
Called pt to let her know that [facility] recommended she see [facility] for her shoulder. Pt already has apt with [facility]GP scheduled. Pt expressed understanding.

## 2024-07-18 ENCOUNTER — TELEPHONE (OUTPATIENT)
Age: 79
End: 2024-07-18

## 2024-07-18 NOTE — TELEPHONE ENCOUNTER
Put her on amlodipine then took her off due to swelling in feet. Then took it again and swelling went down so wants to know if it is ok to continue to take and if she can get a new RX as it helped with swelling

## 2024-07-19 NOTE — TELEPHONE ENCOUNTER
Spoke to pt and informed her of Dr Benoit's response. Per Dr. Benoit- \"If she has had swelling with amlodipine in the past it would likely recur.  Would not restart.  She can follow her blood pressures off of amlodipine and call back with these readings and we can adjust regimen if needed.\"     Pt informed and voiced understanding. States she will call back in one week with BP readings.

## 2024-09-05 ENCOUNTER — TELEPHONE (OUTPATIENT)
Age: 79
End: 2024-09-05

## 2024-09-05 DIAGNOSIS — I10 ESSENTIAL HYPERTENSION: ICD-10-CM

## 2024-09-05 RX ORDER — CARVEDILOL 25 MG/1
25 TABLET ORAL 2 TIMES DAILY WITH MEALS
Qty: 60 TABLET | Refills: 1 | Status: SHIPPED | OUTPATIENT
Start: 2024-09-05

## 2024-09-05 NOTE — TELEPHONE ENCOUNTER
Prescription sent to pharmacy//brendab  Requested Prescriptions     Signed Prescriptions Disp Refills    carvedilol (COREG) 25 MG tablet 60 tablet 1     Sig: Take 1 tablet by mouth 2 times daily (with meals)     Authorizing Provider: BOOGIE EMANUEL     Ordering User: SARAY RAMOS

## 2024-10-16 ENCOUNTER — APPOINTMENT (RX ONLY)
Dept: URBAN - METROPOLITAN AREA CLINIC 23 | Facility: CLINIC | Age: 79
Setting detail: DERMATOLOGY
End: 2024-10-16

## 2024-10-16 DIAGNOSIS — L82.0 INFLAMED SEBORRHEIC KERATOSIS: ICD-10-CM

## 2024-10-16 DIAGNOSIS — L82.1 OTHER SEBORRHEIC KERATOSIS: ICD-10-CM

## 2024-10-16 DIAGNOSIS — L30.4 ERYTHEMA INTERTRIGO: ICD-10-CM | Status: INADEQUATELY CONTROLLED

## 2024-10-16 PROCEDURE — 99214 OFFICE O/P EST MOD 30 MIN: CPT | Mod: 25

## 2024-10-16 PROCEDURE — ? PRESCRIPTION MEDICATION MANAGEMENT

## 2024-10-16 PROCEDURE — 17110 DESTRUCTION B9 LES UP TO 14: CPT

## 2024-10-16 PROCEDURE — ? LIQUID NITROGEN

## 2024-10-16 PROCEDURE — ? COUNSELING

## 2024-10-16 PROCEDURE — ? PRESCRIPTION

## 2024-10-16 RX ORDER — HYDROCORTISONE 25 MG/G
CREAM TOPICAL BID
Qty: 30 | Refills: 2 | Status: ERX | COMMUNITY
Start: 2024-10-16

## 2024-10-16 RX ORDER — KETOCONAZOLE 20 MG/G
CREAM TOPICAL
Qty: 60 | Refills: 3 | Status: ERX | COMMUNITY
Start: 2024-10-16

## 2024-10-16 RX ADMIN — KETOCONAZOLE: 20 CREAM TOPICAL at 00:00

## 2024-10-16 RX ADMIN — HYDROCORTISONE: 25 CREAM TOPICAL at 00:00

## 2024-10-16 ASSESSMENT — LOCATION SIMPLE DESCRIPTION DERM
LOCATION SIMPLE: CHEST
LOCATION SIMPLE: LEFT ANTERIOR NECK
LOCATION SIMPLE: LEFT BREAST
LOCATION SIMPLE: ABDOMEN

## 2024-10-16 ASSESSMENT — LOCATION DETAILED DESCRIPTION DERM
LOCATION DETAILED: LEFT INFERIOR ANTERIOR NECK
LOCATION DETAILED: RIGHT RIB CAGE
LOCATION DETAILED: LEFT MEDIAL BREAST 11-12:00 REGION
LOCATION DETAILED: LEFT MEDIAL SUPERIOR CHEST

## 2024-10-16 ASSESSMENT — LOCATION ZONE DERM
LOCATION ZONE: NECK
LOCATION ZONE: TRUNK

## 2024-10-16 ASSESSMENT — BSA RASH: BSA RASH: 1

## 2024-10-16 NOTE — HPI: EVALUATION OF SKIN LESION(S)
What Type Of Note Output Would You Prefer (Optional)?: Standard Output
Hpi Title: Evaluation of Skin Lesions
How Severe Are Your Spot(S)?: mild
Have Your Spot(S) Been Treated In The Past?: has not been treated
Additional History: \\nPt would like to return for a full body check.

## 2024-10-16 NOTE — PROCEDURE: PRESCRIPTION MEDICATION MANAGEMENT
Initiate Treatment: ketoconazole 2 % topical cream Apply BID to AA  in skin folds under breast as directed\\n\\nhydrocortisone 2.5 % topical cream BID Apply to affected areas twice daily up to 2 weeks/month as directed Can mix with the ketoconazole cream
Detail Level: Simple
Render In Strict Bullet Format?: No

## 2024-10-16 NOTE — PROCEDURE: LIQUID NITROGEN
Application Tool (Optional): Cry-AC
Render Post-Care Instructions In Note?: no
Show Topical Anesthesia Variable?: Yes
Number Of Freeze-Thaw Cycles: 3 freeze-thaw cycles
Medical Necessity Clause: This procedure was medically necessary because the lesions that were treated were:
Detail Level: Detailed
Aperture Size (Optional): C
Spray Paint Text: The liquid nitrogen was applied to the skin utilizing a spray paint frosting technique.
Post-Care Instructions: I reviewed with the patient in detail post-care instructions. Patient is to wear sunprotection, and avoid picking at any of the treated lesions. Pt may apply Vaseline to crusted or scabbing areas.
Medical Necessity Information: It is in your best interest to select a reason for this procedure from the list below. All of these items fulfill various CMS LCD requirements except the new and changing color options.
Consent: The patient's consent was obtained including but not limited to risks of crusting, scabbing, blistering, scarring, darker or lighter pigmentary change, recurrence, incomplete removal and infection.

## 2024-11-14 ENCOUNTER — TELEPHONE (OUTPATIENT)
Age: 79
End: 2024-11-14

## 2024-11-14 RX ORDER — LOSARTAN POTASSIUM AND HYDROCHLOROTHIAZIDE 25; 100 MG/1; MG/1
1 TABLET ORAL DAILY
Qty: 30 TABLET | Refills: 5 | Status: SHIPPED | OUTPATIENT
Start: 2024-11-14

## 2024-11-14 NOTE — TELEPHONE ENCOUNTER
I would have her take the losartan/HCTZ daily   Patient called with Dr. Emanuel's response . Patient requested refill on losartan/hctz called into [facility]. //elbert  Requested Prescriptions     Signed Prescriptions Disp Refills    losartan-hydroCHLOROthiazide (HYZAAR) 100-25 MG per tablet 30 tablet 5     Sig: Take 1 tablet by mouth daily     Authorizing Provider: BOOGIE EMANUEL     Ordering User: SARAY RAMOS        Problem: Patient Centered Care  Goal: Patient preferences are identified and integrated in the patient's plan of care  Description  Interventions:  - What would you like us to know as we care for you?  \"I have not eaten in four days\"  - Provide timely, based on type and severity of pain and evaluate response  - Implement non-pharmacological measures as appropriate and evaluate response  - Consider cultural and social influences on pain and pain management  - Manage/alleviate anxiety  - Utilize distractio status, cognitive ability or social support system  Outcome: Progressing     Problem: GASTROINTESTINAL - ADULT  Goal: Minimal or absence of nausea and vomiting  Description  INTERVENTIONS:  - Maintain adequate hydration with IV or PO as ordered and tolerat available)  - Encourage oral intake as appropriate  - Instruct patient on fluid and nutrition restrictions as appropriate  Outcome: Progressing

## 2024-11-14 NOTE — TELEPHONE ENCOUNTER
Patient called stating that her blood pressure has been high. Patient states that she stopped the amlodipine 5 mg due to ankle swelling. Patient states that she is only taking coreg 25 mg bid. Was told by someone, that she does not want to say, told her to only take the losartan/HCTZ 100-25 mg only when blood pressure gets over 150. Patient states that she had a headache took her blood pressure and took losartan/HCTZ 100-25 mg. Headache is better, only took losartan/hctz 30 minutes ago. Only symptom was headache, no dizziness, blurred vision.    9/19 159/92  9/22 150/90  9/23 154/95  9/28 138/84  11/10 160/93  11/13 166/95  11/14 162/101

## 2024-12-23 ENCOUNTER — TELEPHONE (OUTPATIENT)
Dept: INTERNAL MEDICINE CLINIC | Facility: CLINIC | Age: 79
End: 2024-12-23

## 2024-12-23 ENCOUNTER — TELEPHONE (OUTPATIENT)
Age: 79
End: 2024-12-23

## 2024-12-23 DIAGNOSIS — I95.9 HYPOTENSION, UNSPECIFIED HYPOTENSION TYPE: ICD-10-CM

## 2024-12-23 DIAGNOSIS — I10 ESSENTIAL HYPERTENSION: ICD-10-CM

## 2024-12-23 DIAGNOSIS — R53.82 CHRONIC FATIGUE: Primary | ICD-10-CM

## 2024-12-23 RX ORDER — CARVEDILOL 25 MG/1
25 TABLET ORAL 2 TIMES DAILY WITH MEALS
Qty: 60 TABLET | Refills: 2 | Status: SHIPPED | OUTPATIENT
Start: 2024-12-23

## 2024-12-23 NOTE — TELEPHONE ENCOUNTER
MEDICATION REFILL REQUEST          Name of Medication:  Carvedilol  Dose:  25 MG  Frequency:  2 times a day  Quantity:    Days' supply:  30          Pharmacy Name/Location:  [facility] [id number] [facility] [phone]      Patient states taking one a day instead of 2 because she's not able to see Dr. Benoit until February.

## 2024-12-23 NOTE — TELEPHONE ENCOUNTER
Patient called and informed that she needs to continue taking the Coreg twice a day. I sent her prescription in until her appointment in February//elbert  Requested Prescriptions     Signed Prescriptions Disp Refills    carvedilol (COREG) 25 MG tablet 60 tablet 2     Sig: Take 1 tablet by mouth 2 times daily (with meals)     Authorizing Provider: BOOGIE EMANUEL     Ordering User: SARAY RAMOS

## 2024-12-26 DIAGNOSIS — R53.82 CHRONIC FATIGUE: ICD-10-CM

## 2024-12-26 DIAGNOSIS — I95.9 HYPOTENSION, UNSPECIFIED HYPOTENSION TYPE: ICD-10-CM

## 2024-12-26 LAB
ALBUMIN SERPL-MCNC: 3.1 G/DL (ref 3.2–4.6)
ALBUMIN/GLOB SERPL: 1 (ref 1–1.9)
ALP SERPL-CCNC: 119 U/L (ref 35–104)
ALT SERPL-CCNC: 20 U/L (ref 8–45)
ANION GAP SERPL CALC-SCNC: 12 MMOL/L (ref 7–16)
AST SERPL-CCNC: 22 U/L (ref 15–37)
BASOPHILS # BLD: 0.1 K/UL (ref 0–0.2)
BASOPHILS NFR BLD: 1 % (ref 0–2)
BILIRUB SERPL-MCNC: 0.3 MG/DL (ref 0–1.2)
BUN SERPL-MCNC: 17 MG/DL (ref 8–23)
CALCIUM SERPL-MCNC: 8.7 MG/DL (ref 8.8–10.2)
CHLORIDE SERPL-SCNC: 103 MMOL/L (ref 98–107)
CO2 SERPL-SCNC: 22 MMOL/L (ref 20–29)
CORTIS AM PEAK SERPL-MCNC: 7.7 UG/DL (ref 4.8–19.5)
CREAT SERPL-MCNC: 0.85 MG/DL (ref 0.6–1.1)
DIFFERENTIAL METHOD BLD: ABNORMAL
EOSINOPHIL # BLD: 0.2 K/UL (ref 0–0.8)
EOSINOPHIL NFR BLD: 3 % (ref 0.5–7.8)
ERYTHROCYTE [DISTWIDTH] IN BLOOD BY AUTOMATED COUNT: 16.8 % (ref 11.9–14.6)
GLOBULIN SER CALC-MCNC: 3.1 G/DL (ref 2.3–3.5)
GLUCOSE SERPL-MCNC: 81 MG/DL (ref 70–99)
HCT VFR BLD AUTO: 36.1 % (ref 35.8–46.3)
HGB BLD-MCNC: 11.7 G/DL (ref 11.7–15.4)
IMM GRANULOCYTES # BLD AUTO: 0 K/UL (ref 0–0.5)
IMM GRANULOCYTES NFR BLD AUTO: 0 % (ref 0–5)
LYMPHOCYTES # BLD: 2.5 K/UL (ref 0.5–4.6)
LYMPHOCYTES NFR BLD: 40 % (ref 13–44)
MCH RBC QN AUTO: 28.7 PG (ref 26.1–32.9)
MCHC RBC AUTO-ENTMCNC: 32.4 G/DL (ref 31.4–35)
MCV RBC AUTO: 88.5 FL (ref 82–102)
MONOCYTES # BLD: 0.5 K/UL (ref 0.1–1.3)
MONOCYTES NFR BLD: 9 % (ref 4–12)
NEUTS SEG # BLD: 2.9 K/UL (ref 1.7–8.2)
NEUTS SEG NFR BLD: 47 % (ref 43–78)
NRBC # BLD: 0 K/UL (ref 0–0.2)
PLATELET # BLD AUTO: 224 K/UL (ref 150–450)
PMV BLD AUTO: 10.6 FL (ref 9.4–12.3)
POTASSIUM SERPL-SCNC: 4.1 MMOL/L (ref 3.5–5.1)
PROT SERPL-MCNC: 6.2 G/DL (ref 6.3–8.2)
RBC # BLD AUTO: 4.08 M/UL (ref 4.05–5.2)
SODIUM SERPL-SCNC: 137 MMOL/L (ref 136–145)
T4 FREE SERPL-MCNC: 1.1 NG/DL (ref 0.9–1.7)
TSH, 3RD GENERATION: 0.02 UIU/ML (ref 0.27–4.2)
WBC # BLD AUTO: 6.2 K/UL (ref 4.3–11.1)

## 2024-12-27 ENCOUNTER — TELEPHONE (OUTPATIENT)
Dept: INTERNAL MEDICINE CLINIC | Facility: CLINIC | Age: 79
End: 2024-12-27

## 2024-12-27 DIAGNOSIS — E03.9 HYPOTHYROIDISM, UNSPECIFIED TYPE: ICD-10-CM

## 2024-12-27 DIAGNOSIS — E03.9 HYPOTHYROIDISM, UNSPECIFIED TYPE: Primary | ICD-10-CM

## 2024-12-27 NOTE — TELEPHONE ENCOUNTER
Pt returned call and the message regarding lab results was relayed. Understanding verbalized. 12/27

## 2024-12-27 NOTE — TELEPHONE ENCOUNTER
----- Message from Dr. Mingo Dotson MD sent at 12/27/2024  1:49 PM EST -----  Please notify patient that their lab results are normal, except that I am waiting on another thyroid test and I will notify her it is out of range cms

## 2024-12-28 LAB — T3FREE SERPL-MCNC: 5.8 PG/ML (ref 2–4.4)

## 2024-12-29 DIAGNOSIS — E03.9 HYPOTHYROIDISM, UNSPECIFIED TYPE: Primary | ICD-10-CM

## 2024-12-30 ENCOUNTER — TELEPHONE (OUTPATIENT)
Dept: INTERNAL MEDICINE CLINIC | Facility: CLINIC | Age: 79
End: 2024-12-30

## 2024-12-30 RX ORDER — THYROID 120 MG/1
120 TABLET ORAL DAILY
Qty: 30 TABLET | Refills: 3 | Status: SHIPPED | OUTPATIENT
Start: 2024-12-30

## 2024-12-30 NOTE — TELEPHONE ENCOUNTER
This has been fully explained to the patient, who indicates understanding.  Lab appt scheduled  Rx pending to be sent to pharmacy

## 2024-12-30 NOTE — TELEPHONE ENCOUNTER
----- Message from Dr. Mingo Dotson MD sent at 12/29/2024  7:45 AM EST -----  Thyroid level is too high, she needs to decrease armour thyroid to 120 mg alternate with 180 mg repeat labs in 6 weeks, can rx 120 mg dose so she can do this , ordered lab cms

## 2025-01-02 ENCOUNTER — OFFICE VISIT (OUTPATIENT)
Dept: INTERNAL MEDICINE CLINIC | Facility: CLINIC | Age: 80
End: 2025-01-02

## 2025-01-02 VITALS
HEIGHT: 62 IN | OXYGEN SATURATION: 95 % | TEMPERATURE: 97.3 F | WEIGHT: 124 LBS | DIASTOLIC BLOOD PRESSURE: 76 MMHG | BODY MASS INDEX: 22.82 KG/M2 | HEART RATE: 79 BPM | SYSTOLIC BLOOD PRESSURE: 128 MMHG

## 2025-01-02 DIAGNOSIS — B37.9 CANDIDA ALBICANS INFECTION: Primary | ICD-10-CM

## 2025-01-02 DIAGNOSIS — K21.00 GASTROESOPHAGEAL REFLUX DISEASE WITH ESOPHAGITIS WITHOUT HEMORRHAGE: ICD-10-CM

## 2025-01-02 DIAGNOSIS — R10.9 ABDOMINAL PAIN, UNSPECIFIED ABDOMINAL LOCATION: ICD-10-CM

## 2025-01-02 DIAGNOSIS — I10 ESSENTIAL HYPERTENSION: ICD-10-CM

## 2025-01-02 DIAGNOSIS — E03.9 HYPOTHYROIDISM, UNSPECIFIED TYPE: ICD-10-CM

## 2025-01-02 DIAGNOSIS — Z00.00 MEDICARE ANNUAL WELLNESS VISIT, SUBSEQUENT: ICD-10-CM

## 2025-01-02 DIAGNOSIS — I63.9 CEREBROVASCULAR ACCIDENT (CVA), UNSPECIFIED MECHANISM (HCC): ICD-10-CM

## 2025-01-02 DIAGNOSIS — G43.119 INTRACTABLE MIGRAINE WITH AURA WITHOUT STATUS MIGRAINOSUS: ICD-10-CM

## 2025-01-02 PROBLEM — S06.5XAA SUBDURAL HEMATOMA: Status: RESOLVED | Noted: 2021-11-14 | Resolved: 2025-01-02

## 2025-01-02 PROBLEM — S02.401A MAXILLARY SINUS FRACTURE: Status: RESOLVED | Noted: 2021-11-15 | Resolved: 2025-01-02

## 2025-01-02 PROBLEM — S02.30XA ORBITAL FLOOR FRACTURE: Status: RESOLVED | Noted: 2021-11-15 | Resolved: 2025-01-02

## 2025-01-02 RX ORDER — CLOBETASOL PROPIONATE 0.5 MG/ML
SOLUTION TOPICAL 2 TIMES DAILY
Qty: 1 EACH | Refills: 5 | Status: SHIPPED | OUTPATIENT
Start: 2025-01-02

## 2025-01-02 RX ORDER — CLOBETASOL PROPIONATE 0.5 MG/G
OINTMENT TOPICAL
Qty: 60 G | Refills: 11 | Status: SHIPPED | OUTPATIENT
Start: 2025-01-02

## 2025-01-02 RX ORDER — KETOCONAZOLE 20 MG/G
2 CREAM TOPICAL DAILY
COMMUNITY

## 2025-01-02 RX ORDER — KETOCONAZOLE 20 MG/ML
SHAMPOO, SUSPENSION TOPICAL
Qty: 120 ML | Refills: 11 | Status: SHIPPED | OUTPATIENT
Start: 2025-01-02

## 2025-01-02 RX ORDER — FLUCONAZOLE 100 MG/1
100 TABLET ORAL DAILY
Qty: 7 TABLET | Refills: 0 | Status: SHIPPED | OUTPATIENT
Start: 2025-01-02 | End: 2025-01-09

## 2025-01-02 RX ORDER — NYSTATIN 100000 U/G
OINTMENT TOPICAL 2 TIMES DAILY
Qty: 60 G | Refills: 3 | Status: SHIPPED | OUTPATIENT
Start: 2025-01-02

## 2025-01-02 SDOH — ECONOMIC STABILITY: INCOME INSECURITY: HOW HARD IS IT FOR YOU TO PAY FOR THE VERY BASICS LIKE FOOD, HOUSING, MEDICAL CARE, AND HEATING?: NOT HARD AT ALL

## 2025-01-02 SDOH — ECONOMIC STABILITY: FOOD INSECURITY: WITHIN THE PAST 12 MONTHS, YOU WORRIED THAT YOUR FOOD WOULD RUN OUT BEFORE YOU GOT MONEY TO BUY MORE.: NEVER TRUE

## 2025-01-02 SDOH — ECONOMIC STABILITY: FOOD INSECURITY: WITHIN THE PAST 12 MONTHS, THE FOOD YOU BOUGHT JUST DIDN'T LAST AND YOU DIDN'T HAVE MONEY TO GET MORE.: NEVER TRUE

## 2025-01-02 ASSESSMENT — LIFESTYLE VARIABLES
HOW OFTEN DO YOU HAVE A DRINK CONTAINING ALCOHOL: NEVER
HOW MANY STANDARD DRINKS CONTAINING ALCOHOL DO YOU HAVE ON A TYPICAL DAY: PATIENT DOES NOT DRINK

## 2025-01-02 ASSESSMENT — PATIENT HEALTH QUESTIONNAIRE - PHQ9
1. LITTLE INTEREST OR PLEASURE IN DOING THINGS: NOT AT ALL
SUM OF ALL RESPONSES TO PHQ9 QUESTIONS 1 & 2: 0
SUM OF ALL RESPONSES TO PHQ QUESTIONS 1-9: 0
2. FEELING DOWN, DEPRESSED OR HOPELESS: NOT AT ALL
SUM OF ALL RESPONSES TO PHQ QUESTIONS 1-9: 0

## 2025-01-02 NOTE — PATIENT INSTRUCTIONS

## 2025-01-02 NOTE — PROGRESS NOTES
reflux disease)     no treatment at this time.     Hearing loss     Herpes zoster     Hiatal hernia 7/18/2012    History of CVA (cerebrovascular accident) Dx: 2014    no deficits    History of DVT of lower extremity 9/30/2013    RLE     History of seizure     once/ 2 days after surgery    Hormone replacement therapy     Hormone Pellets    Hx of deep vein thrombophlebitis of lower extremity     total 3. 2 blood clots in 1975 and 1 blood clot in 1981 to Right knee area    Hypertension     managed with meds    Hypokalemia 9/2/2011    Hypothyroid     managed with meds    IBS (irritable bowel syndrome)     diet controlled     Itchy scalp     Long term (current) use of aspirin     Migraine     Nausea with vomiting     Neoplasm of uncertain behavior of skin     Personal history of kidney stones     passed on her own    Pulmonary nodule     Shoulder pain     Skin tag     neck, #25 excised    Sphincter of Oddi dysfunction 2017    Stroke (HCC)     Syncope     URI (upper respiratory infection)     Vitamin D deficiency 11/13/2015     Social History     Socioeconomic History    Marital status:      Spouse name: None    Number of children: None    Years of education: None    Highest education level: None   Tobacco Use    Smoking status: Never    Smokeless tobacco: Never   Substance and Sexual Activity    Alcohol use: Yes     Alcohol/week: 3.0 standard drinks of alcohol    Drug use: No     Social Determinants of Health     Financial Resource Strain: Low Risk  (1/2/2025)    Overall Financial Resource Strain (CARDIA)     Difficulty of Paying Living Expenses: Not hard at all   Food Insecurity: No Food Insecurity (1/2/2025)    Hunger Vital Sign     Worried About Running Out of Food in the Last Year: Never true     Ran Out of Food in the Last Year: Never true   Transportation Needs: Unknown (1/2/2025)    PRAPARE - Transportation     Lack of Transportation (Non-Medical): No   Physical Activity: Inactive (1/2/2025)    Exercise

## 2025-01-09 DIAGNOSIS — L03.90 CELLULITIS, UNSPECIFIED CELLULITIS SITE: Primary | ICD-10-CM

## 2025-01-09 RX ORDER — CEPHALEXIN 500 MG/1
500 CAPSULE ORAL 3 TIMES DAILY
Qty: 21 CAPSULE | Refills: 0 | Status: SHIPPED | OUTPATIENT
Start: 2025-01-09

## 2025-01-17 DIAGNOSIS — L30.4 INTERTRIGO: Primary | ICD-10-CM

## 2025-01-17 RX ORDER — MICONAZOLE NITRATE 20 MG/G
CREAM TOPICAL 2 TIMES DAILY
Qty: 30 G | Refills: 1 | Status: SHIPPED | OUTPATIENT
Start: 2025-01-17

## 2025-07-01 NOTE — PROGRESS NOTES
[facility] CARDIOLOGY, [address], [address]  [place] [address]  PHONE: [phone]    Jenise Arce  1945      SUBJECTIVE:   Jenise Arce is a 80 y.o. female seen for a follow up visit regarding the following:     Chief Complaint   Patient presents with    Hypertension    Hyperlipidemia       HPI:    Patient presents for follow-up.  She was last seen April 11, 2024.  Multiple issues were addressed at today's visit.      Hypertension  Status:  Ambulatory BP readings have been controlled.  Patient reports compliance with medical therapy without side effects.    Medications: Losartan 100/25 mg QD.  Coreg 25 mg BID  Labs:     Echo (5/20/14) : Normal LV systolic function.  EF 55-60%.  Normal diastolic function.  RVSP 40-45.  Mild to moderate tricuspid regurgitation.  Mild mitral regurgitation.    Renal artery US (7/8/14):  No evidence of renal artery stenosis.    Plasma Metanephrine. (6/25/14):  Normal   Echo (11/14):  EF 60-65%  Grade 1 diastolic dysfunction. Mild mitral regurgitaiton.    Echo (5/29/24):  EF 60-65%.  +DD.  Mild MR.  Mild LAE. RVSP 43.       History of CVA  Status:  No recent neurologic symptoms.   Medications:  Aspirin   Prior History:     MRI (5/21/14):  1. Mild chronic small vessel ischemic changes and tiny remote bilateral cerebellar infarctions. 2. No evidence of acute infarction.     Gastric Ulcer  Status:  Persistent gastric ulcer.  Seeing GI.  Several endoscopies.       EKG done today and reviewed with patient showed:  Sinus Rhythm   Normal axis  Rate 82      Past Medical History, Past Surgical History, Family history, Social History, and Medications were all reviewed with the patient today and updated as necessary.           Current Outpatient Medications:     colestipol (COLESTID) 1 g tablet, Take 2 tablets by mouth 2 times daily, Disp: , Rfl:     carvedilol (COREG) 25 MG tablet, Take 1 tablet by mouth 2 times daily (with meals), Disp: 180 tablet, Rfl: 3

## 2025-07-03 ENCOUNTER — OFFICE VISIT (OUTPATIENT)
Age: 80
End: 2025-07-03
Payer: MEDICARE

## 2025-07-03 VITALS
WEIGHT: 104.2 LBS | HEIGHT: 62 IN | BODY MASS INDEX: 19.17 KG/M2 | HEART RATE: 82 BPM | SYSTOLIC BLOOD PRESSURE: 116 MMHG | DIASTOLIC BLOOD PRESSURE: 58 MMHG

## 2025-07-03 DIAGNOSIS — K25.7 CHRONIC GASTRIC ULCER WITHOUT HEMORRHAGE AND WITHOUT PERFORATION: ICD-10-CM

## 2025-07-03 DIAGNOSIS — Z86.73 HISTORY OF CVA (CEREBROVASCULAR ACCIDENT): ICD-10-CM

## 2025-07-03 DIAGNOSIS — I10 ESSENTIAL HYPERTENSION: Primary | ICD-10-CM

## 2025-07-03 PROCEDURE — 1036F TOBACCO NON-USER: CPT | Performed by: INTERNAL MEDICINE

## 2025-07-03 PROCEDURE — G8420 CALC BMI NORM PARAMETERS: HCPCS | Performed by: INTERNAL MEDICINE

## 2025-07-03 PROCEDURE — 3074F SYST BP LT 130 MM HG: CPT | Performed by: INTERNAL MEDICINE

## 2025-07-03 PROCEDURE — 1126F AMNT PAIN NOTED NONE PRSNT: CPT | Performed by: INTERNAL MEDICINE

## 2025-07-03 PROCEDURE — 1123F ACP DISCUSS/DSCN MKR DOCD: CPT | Performed by: INTERNAL MEDICINE

## 2025-07-03 PROCEDURE — G8400 PT W/DXA NO RESULTS DOC: HCPCS | Performed by: INTERNAL MEDICINE

## 2025-07-03 PROCEDURE — G8428 CUR MEDS NOT DOCUMENT: HCPCS | Performed by: INTERNAL MEDICINE

## 2025-07-03 PROCEDURE — 93000 ELECTROCARDIOGRAM COMPLETE: CPT | Performed by: INTERNAL MEDICINE

## 2025-07-03 PROCEDURE — 99213 OFFICE O/P EST LOW 20 MIN: CPT | Performed by: INTERNAL MEDICINE

## 2025-07-03 PROCEDURE — 3078F DIAST BP <80 MM HG: CPT | Performed by: INTERNAL MEDICINE

## 2025-07-03 PROCEDURE — 1090F PRES/ABSN URINE INCON ASSESS: CPT | Performed by: INTERNAL MEDICINE

## 2025-07-03 RX ORDER — COLESTIPOL HYDROCHLORIDE 1 G/1
2 TABLET ORAL 2 TIMES DAILY
COMMUNITY
Start: 2025-06-06

## 2025-07-03 RX ORDER — CARVEDILOL 25 MG/1
25 TABLET ORAL 2 TIMES DAILY WITH MEALS
Qty: 180 TABLET | Refills: 3 | Status: SHIPPED | OUTPATIENT
Start: 2025-07-03

## 2025-07-03 RX ORDER — LOSARTAN POTASSIUM AND HYDROCHLOROTHIAZIDE 25; 100 MG/1; MG/1
1 TABLET ORAL DAILY
Qty: 90 TABLET | Refills: 3 | Status: SHIPPED | OUTPATIENT
Start: 2025-07-03

## 2025-07-03 ASSESSMENT — ENCOUNTER SYMPTOMS: SHORTNESS OF BREATH: 0

## (undated) DEVICE — CANNULA NSL ORAL AD FOR CAPNOFLEX CO2 O2 AIRLFE

## (undated) DEVICE — NDL PRT INJ NSAF BLNT 18GX1.5 --

## (undated) DEVICE — CONNECTOR TBNG OD5-7MM O2 END DISP

## (undated) DEVICE — SYR 10ML LUER LOK 1/5ML GRAD --

## (undated) DEVICE — SYR 5ML 1/5 GRAD LL NSAF LF --

## (undated) DEVICE — ADULT SPO2 SENSOR: Brand: NELLCOR

## (undated) DEVICE — BLOCK BITE AD 60FR W/ VELC STRP ADDRESSES MOST PT AND

## (undated) DEVICE — (D)SYR 10ML 1/5ML GRAD NSAF -- PKGING CHANGE USE ITEM 338027

## (undated) DEVICE — SYR 3ML LL TIP 1/10ML GRAD --

## (undated) DEVICE — SNARE POLYP SM W13MMXL240CM SHTH DIA2.4MM OVL FLX DISP

## (undated) DEVICE — RETRIEVAL BALLOON CATHETER: Brand: EXTRACTOR™ PRO RX

## (undated) DEVICE — KENDALL RADIOLUCENT FOAM MONITORING ELECTRODE RECTANGULAR SHAPE: Brand: KENDALL

## (undated) DEVICE — DEVICE LCK BILI RAP EXCHG OLPS --

## (undated) DEVICE — GUIDEWIRE ENDOSCP L260CM DIA0.035IN STD BILI HYDRPHLC EXT

## (undated) DEVICE — KENDALL SCD EXPRESS SLEEVES, KNEE LENGTH, MEDIUM: Brand: KENDALL SCD

## (undated) DEVICE — REM POLYHESIVE ADULT PATIENT RETURN ELECTRODE: Brand: VALLEYLAB

## (undated) DEVICE — FORCEPS BX L240CM JAW DIA2.8MM L CAP W/ NDL MIC MESH TOOTH

## (undated) DEVICE — SPHINCTEROTOME: Brand: JAGTOME RX 44

## (undated) DEVICE — CONTAINER PREFIL FRMLN 40ML --